# Patient Record
Sex: FEMALE | Race: WHITE | NOT HISPANIC OR LATINO | Employment: FULL TIME | URBAN - METROPOLITAN AREA
[De-identification: names, ages, dates, MRNs, and addresses within clinical notes are randomized per-mention and may not be internally consistent; named-entity substitution may affect disease eponyms.]

---

## 2018-01-19 ENCOUNTER — OFFICE VISIT CONVERTED (OUTPATIENT)
Dept: FAMILY MEDICINE CLINIC | Facility: CLINIC | Age: 48
End: 2018-01-19
Attending: NURSE PRACTITIONER

## 2018-01-19 ENCOUNTER — CONVERSION ENCOUNTER (OUTPATIENT)
Dept: FAMILY MEDICINE CLINIC | Facility: CLINIC | Age: 48
End: 2018-01-19

## 2018-08-24 ENCOUNTER — OFFICE VISIT CONVERTED (OUTPATIENT)
Dept: FAMILY MEDICINE CLINIC | Facility: CLINIC | Age: 48
End: 2018-08-24
Attending: NURSE PRACTITIONER

## 2018-11-29 ENCOUNTER — OFFICE VISIT CONVERTED (OUTPATIENT)
Dept: FAMILY MEDICINE CLINIC | Facility: CLINIC | Age: 48
End: 2018-11-29
Attending: NURSE PRACTITIONER

## 2018-11-29 ENCOUNTER — CONVERSION ENCOUNTER (OUTPATIENT)
Dept: FAMILY MEDICINE CLINIC | Facility: CLINIC | Age: 48
End: 2018-11-29

## 2019-03-26 ENCOUNTER — OFFICE VISIT CONVERTED (OUTPATIENT)
Dept: FAMILY MEDICINE CLINIC | Facility: CLINIC | Age: 49
End: 2019-03-26
Attending: FAMILY MEDICINE

## 2019-03-26 ENCOUNTER — HOSPITAL ENCOUNTER (OUTPATIENT)
Dept: FAMILY MEDICINE CLINIC | Facility: CLINIC | Age: 49
Discharge: HOME | End: 2019-03-26
Attending: FAMILY MEDICINE

## 2019-03-26 ENCOUNTER — CONVERSION ENCOUNTER (OUTPATIENT)
Dept: FAMILY MEDICINE CLINIC | Facility: CLINIC | Age: 49
End: 2019-03-26

## 2019-03-29 LAB — BACTERIA SPEC AEROBE CULT: NORMAL

## 2020-01-02 ENCOUNTER — CONVERSION ENCOUNTER (OUTPATIENT)
Dept: FAMILY MEDICINE CLINIC | Facility: CLINIC | Age: 50
End: 2020-01-02

## 2020-01-02 ENCOUNTER — OFFICE VISIT CONVERTED (OUTPATIENT)
Dept: FAMILY MEDICINE CLINIC | Facility: CLINIC | Age: 50
End: 2020-01-02
Attending: FAMILY MEDICINE

## 2020-11-03 ENCOUNTER — OFFICE VISIT CONVERTED (OUTPATIENT)
Dept: FAMILY MEDICINE CLINIC | Facility: CLINIC | Age: 50
End: 2020-11-03
Attending: FAMILY MEDICINE

## 2020-11-03 ENCOUNTER — HOSPITAL ENCOUNTER (OUTPATIENT)
Dept: FAMILY MEDICINE CLINIC | Facility: CLINIC | Age: 50
Discharge: HOME | End: 2020-11-03
Attending: FAMILY MEDICINE

## 2020-11-04 LAB
ALBUMIN SERPL-MCNC: 4.4 G/DL (ref 3.5–5)
ALBUMIN/GLOB SERPL: 2 {RATIO} (ref 1.4–2.6)
ALP SERPL-CCNC: 105 U/L (ref 42–98)
ALT SERPL-CCNC: 14 U/L (ref 10–40)
ANION GAP SERPL CALC-SCNC: 16 MMOL/L (ref 8–19)
AST SERPL-CCNC: 18 U/L (ref 15–50)
BASOPHILS # BLD AUTO: 0.11 10*3/UL (ref 0–0.2)
BASOPHILS NFR BLD AUTO: 1.1 % (ref 0–3)
BILIRUB SERPL-MCNC: <0.15 MG/DL (ref 0.2–1.3)
BUN SERPL-MCNC: 11 MG/DL (ref 5–25)
BUN/CREAT SERPL: 14 {RATIO} (ref 6–20)
CALCIUM SERPL-MCNC: 9.4 MG/DL (ref 8.7–10.4)
CHLORIDE SERPL-SCNC: 108 MMOL/L (ref 99–111)
CONV ABS IMM GRAN: 0.03 10*3/UL (ref 0–0.2)
CONV CO2: 23 MMOL/L (ref 22–32)
CONV IMMATURE GRAN: 0.3 % (ref 0–1.8)
CONV TOTAL PROTEIN: 6.6 G/DL (ref 6.3–8.2)
CREAT UR-MCNC: 0.81 MG/DL (ref 0.5–0.9)
DEPRECATED RDW RBC AUTO: 46.3 FL (ref 36.4–46.3)
EOSINOPHIL # BLD AUTO: 0.42 10*3/UL (ref 0–0.7)
EOSINOPHIL # BLD AUTO: 4.1 % (ref 0–7)
ERYTHROCYTE [DISTWIDTH] IN BLOOD BY AUTOMATED COUNT: 12.9 % (ref 11.7–14.4)
FOLATE SERPL-MCNC: 12.1 NG/ML (ref 4.8–20)
GFR SERPLBLD BASED ON 1.73 SQ M-ARVRAT: >60 ML/MIN/{1.73_M2}
GLOBULIN UR ELPH-MCNC: 2.2 G/DL (ref 2–3.5)
GLUCOSE SERPL-MCNC: 99 MG/DL (ref 65–99)
HCT VFR BLD AUTO: 45.7 % (ref 37–47)
HGB BLD-MCNC: 15 G/DL (ref 12–16)
LYMPHOCYTES # BLD AUTO: 3.51 10*3/UL (ref 1–5)
LYMPHOCYTES NFR BLD AUTO: 33.8 % (ref 20–45)
MCH RBC QN AUTO: 31.9 PG (ref 27–31)
MCHC RBC AUTO-ENTMCNC: 32.8 G/DL (ref 33–37)
MCV RBC AUTO: 97.2 FL (ref 81–99)
MONOCYTES # BLD AUTO: 0.66 10*3/UL (ref 0.2–1.2)
MONOCYTES NFR BLD AUTO: 6.4 % (ref 3–10)
NEUTROPHILS # BLD AUTO: 5.64 10*3/UL (ref 2–8)
NEUTROPHILS NFR BLD AUTO: 54.3 % (ref 30–85)
NRBC CBCN: 0 % (ref 0–0.7)
OSMOLALITY SERPL CALC.SUM OF ELEC: 295 MOSM/KG (ref 273–304)
PLATELET # BLD AUTO: 306 10*3/UL (ref 130–400)
PMV BLD AUTO: 11.1 FL (ref 9.4–12.3)
POTASSIUM SERPL-SCNC: 4.3 MMOL/L (ref 3.5–5.3)
RBC # BLD AUTO: 4.7 10*6/UL (ref 4.2–5.4)
SODIUM SERPL-SCNC: 143 MMOL/L (ref 135–147)
T4 FREE SERPL-MCNC: 1.1 NG/DL (ref 0.9–1.8)
TSH SERPL-ACNC: 1.35 M[IU]/L (ref 0.27–4.2)
VIT B12 SERPL-MCNC: 891 PG/ML (ref 211–911)
WBC # BLD AUTO: 10.37 10*3/UL (ref 4.8–10.8)

## 2020-11-20 ENCOUNTER — OFFICE VISIT CONVERTED (OUTPATIENT)
Dept: FAMILY MEDICINE CLINIC | Facility: CLINIC | Age: 50
End: 2020-11-20
Attending: NURSE PRACTITIONER

## 2020-11-20 ENCOUNTER — CONVERSION ENCOUNTER (OUTPATIENT)
Dept: FAMILY MEDICINE CLINIC | Facility: CLINIC | Age: 50
End: 2020-11-20

## 2020-11-20 ENCOUNTER — HOSPITAL ENCOUNTER (OUTPATIENT)
Dept: FAMILY MEDICINE CLINIC | Facility: CLINIC | Age: 50
Discharge: HOME | End: 2020-11-20
Attending: NURSE PRACTITIONER

## 2021-05-09 VITALS
DIASTOLIC BLOOD PRESSURE: 88 MMHG | TEMPERATURE: 97.2 F | HEIGHT: 65 IN | SYSTOLIC BLOOD PRESSURE: 132 MMHG | BODY MASS INDEX: 27.72 KG/M2 | OXYGEN SATURATION: 96 % | HEART RATE: 92 BPM | WEIGHT: 166.37 LBS

## 2021-05-09 VITALS
WEIGHT: 168 LBS | SYSTOLIC BLOOD PRESSURE: 145 MMHG | HEART RATE: 80 BPM | TEMPERATURE: 97.7 F | DIASTOLIC BLOOD PRESSURE: 90 MMHG | OXYGEN SATURATION: 97 %

## 2021-05-09 VITALS
TEMPERATURE: 97.4 F | HEART RATE: 81 BPM | SYSTOLIC BLOOD PRESSURE: 130 MMHG | WEIGHT: 168.25 LBS | OXYGEN SATURATION: 98 % | DIASTOLIC BLOOD PRESSURE: 86 MMHG

## 2021-05-09 VITALS
WEIGHT: 169.5 LBS | HEIGHT: 65 IN | TEMPERATURE: 97.6 F | BODY MASS INDEX: 28.24 KG/M2 | OXYGEN SATURATION: 99 % | SYSTOLIC BLOOD PRESSURE: 120 MMHG | HEART RATE: 85 BPM | DIASTOLIC BLOOD PRESSURE: 76 MMHG

## 2021-05-09 VITALS
HEIGHT: 65 IN | WEIGHT: 152.5 LBS | TEMPERATURE: 98 F | BODY MASS INDEX: 25.41 KG/M2 | SYSTOLIC BLOOD PRESSURE: 142 MMHG | DIASTOLIC BLOOD PRESSURE: 92 MMHG | HEART RATE: 75 BPM | OXYGEN SATURATION: 96 %

## 2021-05-09 VITALS
TEMPERATURE: 97.6 F | OXYGEN SATURATION: 98 % | HEART RATE: 101 BPM | WEIGHT: 165.5 LBS | SYSTOLIC BLOOD PRESSURE: 120 MMHG | DIASTOLIC BLOOD PRESSURE: 84 MMHG

## 2021-05-09 VITALS
DIASTOLIC BLOOD PRESSURE: 94 MMHG | TEMPERATURE: 97 F | SYSTOLIC BLOOD PRESSURE: 162 MMHG | WEIGHT: 168.5 LBS | HEART RATE: 85 BPM | OXYGEN SATURATION: 97 %

## 2021-07-26 DIAGNOSIS — F32.9 MAJOR DEPRESSIVE DISORDER, SINGLE EPISODE, UNSPECIFIED: ICD-10-CM

## 2021-07-26 RX ORDER — QUETIAPINE 150 MG/1
150 TABLET, FILM COATED, EXTENDED RELEASE ORAL DAILY
COMMUNITY
Start: 2021-04-29 | End: 2021-07-26

## 2021-07-26 RX ORDER — DULOXETIN HYDROCHLORIDE 60 MG/1
CAPSULE, DELAYED RELEASE ORAL
Qty: 30 CAPSULE | Refills: 0 | Status: SHIPPED | OUTPATIENT
Start: 2021-07-26 | End: 2021-08-24

## 2021-07-26 RX ORDER — QUETIAPINE 150 MG/1
TABLET, FILM COATED, EXTENDED RELEASE ORAL
Qty: 30 TABLET | Refills: 2 | Status: SHIPPED | OUTPATIENT
Start: 2021-07-26 | End: 2021-11-05

## 2021-07-26 RX ORDER — DULOXETIN HYDROCHLORIDE 60 MG/1
60 CAPSULE, DELAYED RELEASE ORAL DAILY
COMMUNITY
Start: 2021-04-29 | End: 2021-07-26

## 2021-07-29 ENCOUNTER — OFFICE VISIT (OUTPATIENT)
Dept: FAMILY MEDICINE CLINIC | Facility: CLINIC | Age: 51
End: 2021-07-29

## 2021-07-29 VITALS
HEART RATE: 83 BPM | WEIGHT: 160.8 LBS | DIASTOLIC BLOOD PRESSURE: 70 MMHG | OXYGEN SATURATION: 96 % | TEMPERATURE: 98.4 F | SYSTOLIC BLOOD PRESSURE: 125 MMHG | BODY MASS INDEX: 26.76 KG/M2

## 2021-07-29 DIAGNOSIS — R51.9 CHRONIC NONINTRACTABLE HEADACHE, UNSPECIFIED HEADACHE TYPE: ICD-10-CM

## 2021-07-29 DIAGNOSIS — G43.909 MIGRAINE WITHOUT STATUS MIGRAINOSUS, NOT INTRACTABLE, UNSPECIFIED MIGRAINE TYPE: Primary | ICD-10-CM

## 2021-07-29 DIAGNOSIS — L98.9 SKIN LESION: ICD-10-CM

## 2021-07-29 DIAGNOSIS — F32.A ANXIETY AND DEPRESSION: ICD-10-CM

## 2021-07-29 DIAGNOSIS — M19.90 ARTHRITIS: ICD-10-CM

## 2021-07-29 DIAGNOSIS — F41.9 ANXIETY AND DEPRESSION: ICD-10-CM

## 2021-07-29 DIAGNOSIS — G89.29 CHRONIC NONINTRACTABLE HEADACHE, UNSPECIFIED HEADACHE TYPE: ICD-10-CM

## 2021-07-29 PROCEDURE — 99214 OFFICE O/P EST MOD 30 MIN: CPT | Performed by: FAMILY MEDICINE

## 2021-07-29 RX ORDER — TOPIRAMATE 25 MG/1
25 TABLET ORAL DAILY
Qty: 30 TABLET | Refills: 2 | Status: SHIPPED | OUTPATIENT
Start: 2021-07-29 | End: 2021-11-05

## 2021-07-29 RX ORDER — IBUPROFEN 800 MG/1
800 TABLET ORAL DAILY PRN
Qty: 30 TABLET | Refills: 3 | Status: SHIPPED | OUTPATIENT
Start: 2021-07-29 | End: 2021-11-29

## 2021-07-29 NOTE — PROGRESS NOTES
Chief Complaint  Anxiety (Refills) and Depression    Subjective          Rosie Medrano presents to [facility]  Pt has chronic headaches- she has had to miss 3 days over last 6 months due to headaches  Pt has left shoulder skin lesion that is getting larger and more irregular over last few months.    Anxiety  Presents for follow-up visit. Patient reports no chest pain, compulsions, confusion, decreased concentration, depressed mood, dizziness, dry mouth, excessive worry, feeling of choking, hyperventilation, insomnia, irritability, malaise, muscle tension, nausea, nervous/anxious behavior, obsessions, palpitations, panic, restlessness, shortness of breath or suicidal ideas. Symptoms occur occasionally. The severity of symptoms is moderate. The quality of sleep is good. Nighttime awakenings: none.     Her past medical history is significant for depression. Compliance with medications is %. Treatment side effects: no side effects.   Depression  Visit Type: follow-up  Patient presents with the following symptoms: fatigue and hypersomnia.  Patient is not experiencing: anhedonia, chest pain, choking sensation, compulsions, confusion, decreased concentration, depressed mood, dizziness, dry mouth, excessive worry, feelings of hopelessness, feelings of worthlessness, hyperventilation, insomnia, irritability, malaise, memory impairment, muscle tension, nausea, nervousness/anxiety, obsessions, palpitations, panic, restlessness, shortness of breath, suicidal ideas, suicidal planning, thoughts of death, weight gain and weight loss.  Frequency of symptoms: occasionally   Severity: moderate   Sleep quality: good  Nighttime awakenings: none  Compliance with medications:  %  Treatment side effects: no side effects.  Headache   Chronicity: intermittent x 1 yr. Episode onset: 1 yr ago. Episode frequency: gets headache once every 2 weeks. Progression since onset: last headache was 2  weeks ago. The pain is located in the occipital and right unilateral region. The pain does not radiate. The pain quality is similar to prior headaches. The quality of the pain is described as sharp. Pain severity now: now no pain. Associated symptoms include vomiting. Pertinent negatives include no abdominal pain, abnormal behavior, anorexia, back pain, blurred vision, coughing, dizziness, drainage, ear pain, eye pain, eye redness, eye watering, facial sweating, fever, hearing loss, insomnia, loss of balance, muscle aches, nausea, neck pain, numbness, phonophobia, photophobia, rhinorrhea, scalp tenderness, seizures, sinus pressure, sore throat, swollen glands, tingling, tinnitus, visual change, weakness or weight loss. Nothing aggravates the symptoms. She has tried Excedrin for the symptoms. The treatment provided significant relief.       Objective   No Known Allergies    There is no immunization history on file for this patient.    Vital Signs:   Vitals:    07/29/21 1509   BP: 125/70   Pulse: 83   Temp: 98.4 °F (36.9 °C)   SpO2: 96%   Weight: 72.9 kg (160 lb 12.8 oz)       Physical Exam  Vitals reviewed.   Constitutional:       Appearance: Normal appearance. She is well-developed.   HENT:      Head: Normocephalic and atraumatic.      Right Ear: External ear normal.      Left Ear: External ear normal.      Mouth/Throat:      Pharynx: No oropharyngeal exudate.   Eyes:      Conjunctiva/sclera: Conjunctivae normal.      Pupils: Pupils are equal, round, and reactive to light.   Cardiovascular:      Rate and Rhythm: Normal rate and regular rhythm.      Pulses: Normal pulses.      Heart sounds: Normal heart sounds. No murmur heard.   No friction rub. No gallop.    Pulmonary:      Effort: Pulmonary effort is normal.      Breath sounds: Normal breath sounds. No wheezing or rhonchi.   Abdominal:      General: Bowel sounds are normal. There is no distension.      Palpations: Abdomen is soft.      Tenderness: There is no  abdominal tenderness.   Skin:     General: Skin is warm and dry.      Comments: Left shoulder 1cm irregular pigmented raised skin lesion   Neurological:      Mental Status: She is alert and oriented to person, place, and time.      Cranial Nerves: No cranial nerve deficit.   Psychiatric:         Mood and Affect: Mood and affect normal.         Behavior: Behavior normal.         Thought Content: Thought content normal.         Judgment: Judgment normal.        Result Review :   The following data was reviewed by: Andrés Marcial MD on 07/29/2021:  CMP    CMP 11/3/20   Glucose 99   BUN 11   Creatinine 0.81   Sodium 143   Potassium 4.3   Chloride 108   Calcium 9.4   Albumin 4.4   Total Bilirubin <0.15 (A)   Alkaline Phosphatase 105 (A)   AST (SGOT) 18   ALT (SGPT) 14   (A) Abnormal value            CBC    CBC 11/3/20   WBC 10.37   RBC 4.70   Hemoglobin 15.0   Hematocrit 45.7   MCV 97.2   MCH 31.9 (A)   MCHC 32.8 (A)   RDW 12.9   Platelets 306   (A) Abnormal value            TSH    TSH 11/3/20   TSH 1.350                     Assessment and Plan    Diagnoses and all orders for this visit:    1. Migraine without status migrainosus, not intractable, unspecified migraine type (Primary)  -     topiramate (Topamax) 25 MG tablet; Take 1 tablet by mouth Daily.  Dispense: 30 tablet; Refill: 2  -     MRI Brain With & Without Contrast; Future    2. Arthritis  -     ibuprofen (ADVIL,MOTRIN) 800 MG tablet; Take 1 tablet by mouth Daily As Needed for Mild Pain .  Dispense: 30 tablet; Refill: 3    3. Chronic nonintractable headache, unspecified headache type  -     MRI Brain With & Without Contrast; Future    4. Anxiety and depression    5. Skin lesion    will set up appt to remove lesion in office.        Follow Up   Return in about 1 month (around 8/29/2021) for Recheck.  Patient was given instructions and counseling regarding her condition or for health maintenance advice. Please see specific information pulled into the AVS if  appropriate.

## 2021-08-12 ENCOUNTER — HOSPITAL ENCOUNTER (OUTPATIENT)
Dept: MRI IMAGING | Facility: HOSPITAL | Age: 51
Discharge: HOME | End: 2021-08-12
Admitting: FAMILY MEDICINE

## 2021-08-12 DIAGNOSIS — R51.9 CHRONIC NONINTRACTABLE HEADACHE, UNSPECIFIED HEADACHE TYPE: ICD-10-CM

## 2021-08-12 DIAGNOSIS — G89.29 CHRONIC NONINTRACTABLE HEADACHE, UNSPECIFIED HEADACHE TYPE: ICD-10-CM

## 2021-08-12 DIAGNOSIS — G43.909 MIGRAINE WITHOUT STATUS MIGRAINOSUS, NOT INTRACTABLE, UNSPECIFIED MIGRAINE TYPE: ICD-10-CM

## 2021-08-12 PROCEDURE — 70553 MRI BRAIN STEM W/O & W/DYE: CPT

## 2021-08-12 PROCEDURE — 0 GADOBENATE DIMEGLUMINE 529 MG/ML SOLUTION: Performed by: FAMILY MEDICINE

## 2021-08-12 PROCEDURE — A9577 INJ MULTIHANCE: HCPCS | Performed by: FAMILY MEDICINE

## 2021-08-12 RX ADMIN — GADOBENATE DIMEGLUMINE 16 ML: 529 INJECTION, SOLUTION INTRAVENOUS at 16:12

## 2021-08-13 ENCOUNTER — TELEPHONE (OUTPATIENT)
Dept: FAMILY MEDICINE CLINIC | Facility: CLINIC | Age: 51
End: 2021-08-13

## 2021-08-13 NOTE — PROGRESS NOTES
Call pt:MRI showed some nonspecific changes, this may not be anything, but it could be caused by a variety of things, including lyme disease, migraine headaches, or MS.  Please follow-up in office to discuss.  No tumors were seen.Please schedule an appt in next 1-2 weeks to discuss.

## 2021-08-13 NOTE — TELEPHONE ENCOUNTER
Caller: Rosie Medrano    Relationship to patient: Self    Best call back number: [phone]     Patient is needing: PT CALLED TO INFORM THE [place] PAPERWORK CAN BE MAILED TO HER HOUSE.  THANK YOU.

## 2021-08-24 DIAGNOSIS — F32.9 MAJOR DEPRESSIVE DISORDER, SINGLE EPISODE, UNSPECIFIED: ICD-10-CM

## 2021-08-24 RX ORDER — DULOXETIN HYDROCHLORIDE 60 MG/1
CAPSULE, DELAYED RELEASE ORAL
Qty: 30 CAPSULE | Refills: 0 | Status: SHIPPED | OUTPATIENT
Start: 2021-08-24 | End: 2021-09-17

## 2021-09-17 DIAGNOSIS — F32.9 MAJOR DEPRESSIVE DISORDER, SINGLE EPISODE, UNSPECIFIED: ICD-10-CM

## 2021-09-17 RX ORDER — DULOXETIN HYDROCHLORIDE 60 MG/1
CAPSULE, DELAYED RELEASE ORAL
Qty: 30 CAPSULE | Refills: 0 | Status: SHIPPED | OUTPATIENT
Start: 2021-09-17 | End: 2021-11-05

## 2021-11-05 DIAGNOSIS — F32.9 MAJOR DEPRESSIVE DISORDER, SINGLE EPISODE, UNSPECIFIED: ICD-10-CM

## 2021-11-05 DIAGNOSIS — G43.909 MIGRAINE WITHOUT STATUS MIGRAINOSUS, NOT INTRACTABLE, UNSPECIFIED MIGRAINE TYPE: ICD-10-CM

## 2021-11-05 RX ORDER — DULOXETIN HYDROCHLORIDE 60 MG/1
CAPSULE, DELAYED RELEASE ORAL
Qty: 30 CAPSULE | Refills: 0 | Status: SHIPPED | OUTPATIENT
Start: 2021-11-05 | End: 2021-12-06

## 2021-11-05 RX ORDER — TOPIRAMATE 25 MG/1
25 TABLET ORAL DAILY
Qty: 30 TABLET | Refills: 0 | Status: SHIPPED | OUTPATIENT
Start: 2021-11-05 | End: 2021-11-19

## 2021-11-05 RX ORDER — QUETIAPINE 150 MG/1
TABLET, FILM COATED, EXTENDED RELEASE ORAL
Qty: 30 TABLET | Refills: 0 | Status: SHIPPED | OUTPATIENT
Start: 2021-11-05 | End: 2021-12-06

## 2021-11-19 ENCOUNTER — OFFICE VISIT (OUTPATIENT)
Dept: FAMILY MEDICINE CLINIC | Facility: CLINIC | Age: 51
End: 2021-11-19

## 2021-11-19 VITALS
SYSTOLIC BLOOD PRESSURE: 140 MMHG | HEART RATE: 88 BPM | BODY MASS INDEX: 27.66 KG/M2 | HEIGHT: 65 IN | DIASTOLIC BLOOD PRESSURE: 90 MMHG | OXYGEN SATURATION: 97 % | WEIGHT: 166 LBS | TEMPERATURE: 97.3 F

## 2021-11-19 DIAGNOSIS — R53.83 FATIGUE, UNSPECIFIED TYPE: ICD-10-CM

## 2021-11-19 DIAGNOSIS — R90.89 ABNORMAL BRAIN MRI: Primary | ICD-10-CM

## 2021-11-19 DIAGNOSIS — I10 PRIMARY HYPERTENSION: ICD-10-CM

## 2021-11-19 DIAGNOSIS — I15.9 SECONDARY HYPERTENSION: ICD-10-CM

## 2021-11-19 DIAGNOSIS — G43.909 MIGRAINE WITHOUT STATUS MIGRAINOSUS, NOT INTRACTABLE, UNSPECIFIED MIGRAINE TYPE: ICD-10-CM

## 2021-11-19 PROCEDURE — 82607 VITAMIN B-12: CPT | Performed by: FAMILY MEDICINE

## 2021-11-19 PROCEDURE — 80053 COMPREHEN METABOLIC PANEL: CPT | Performed by: FAMILY MEDICINE

## 2021-11-19 PROCEDURE — 84439 ASSAY OF FREE THYROXINE: CPT | Performed by: FAMILY MEDICINE

## 2021-11-19 PROCEDURE — 85025 COMPLETE CBC W/AUTO DIFF WBC: CPT | Performed by: FAMILY MEDICINE

## 2021-11-19 PROCEDURE — 82746 ASSAY OF FOLIC ACID SERUM: CPT | Performed by: FAMILY MEDICINE

## 2021-11-19 PROCEDURE — 99214 OFFICE O/P EST MOD 30 MIN: CPT | Performed by: FAMILY MEDICINE

## 2021-11-19 PROCEDURE — 84443 ASSAY THYROID STIM HORMONE: CPT | Performed by: FAMILY MEDICINE

## 2021-11-19 RX ORDER — PROPRANOLOL HCL 60 MG
60 CAPSULE, EXTENDED RELEASE 24HR ORAL DAILY
Qty: 30 CAPSULE | Refills: 1 | Status: SHIPPED | OUTPATIENT
Start: 2021-11-19 | End: 2022-01-12

## 2021-11-19 RX ORDER — SUMATRIPTAN 100 MG/1
TABLET, FILM COATED ORAL
Qty: 10 TABLET | Refills: 3 | Status: SHIPPED | OUTPATIENT
Start: 2021-11-19 | End: 2022-05-10 | Stop reason: SDUPTHER

## 2021-11-19 NOTE — PROGRESS NOTES
"Chief Complaint  Headache (follow up to MRI for headaches), Fatigue (extreme fatigue), and Nevus (wants a mole removed from left shoulder)    Subjective          Rosie Medrano presents to [facility]  Discussed migraine headaches- topamax is causing her to oversleep- pt is having migraine headache weekly  Discussed MRI- nonspecific findings in cerebrum     Pt has raised skin colored lesion left shoulder- looks to likely be large skin tag- has had x several months- is getting irritated more often  Fatigue  This is a new problem. The current episode started more than 1 month ago. The problem occurs intermittently. The problem has been unchanged. Associated symptoms include fatigue and headaches. Pertinent negatives include no abdominal pain, anorexia, arthralgias, change in bowel habit, chest pain, chills, congestion, coughing, diaphoresis, fever, joint swelling, myalgias, nausea, neck pain, numbness, rash, sore throat, swollen glands, vertigo, visual change, vomiting or weakness. Nothing aggravates the symptoms. She has tried nothing for the symptoms.       Objective   No Known Allergies    There is no immunization history on file for this patient.    Vital Signs:   Vitals:    11/19/21 1706   BP: 140/90   BP Location: Left arm   Pulse: 88   Temp: 97.3 °F (36.3 °C)   SpO2: 97%   Weight: 75.3 kg (166 lb)   Height: 165.1 cm (65\")       Physical Exam  Vitals reviewed. Exam conducted with a chaperone present.   Constitutional:       Appearance: Normal appearance. She is well-developed.   HENT:      Head: Normocephalic and atraumatic.      Right Ear: External ear normal.      Left Ear: External ear normal.      Mouth/Throat:      Mouth: Mucous membranes are moist.      Pharynx: Oropharynx is clear. No oropharyngeal exudate or posterior oropharyngeal erythema.   Eyes:      Extraocular Movements: Extraocular movements intact.      Conjunctiva/sclera: Conjunctivae normal.      Pupils: " Pupils are equal, round, and reactive to light.   Cardiovascular:      Rate and Rhythm: Normal rate and regular rhythm.      Pulses: Normal pulses.      Heart sounds: Normal heart sounds. No murmur heard.  No friction rub. No gallop.    Pulmonary:      Effort: Pulmonary effort is normal.      Breath sounds: Normal breath sounds. No wheezing or rhonchi.   Abdominal:      General: Abdomen is flat. Bowel sounds are normal. There is no distension.      Palpations: Abdomen is soft. There is no mass.      Tenderness: There is no abdominal tenderness. There is no guarding or rebound.      Hernia: No hernia is present.   Musculoskeletal:      Cervical back: Normal range of motion and neck supple.   Skin:     General: Skin is warm and dry.      Capillary Refill: Capillary refill takes less than 2 seconds.      Comments: O.25-0.5cm skin colored raised lesion left posterior shoulder   Neurological:      General: No focal deficit present.      Mental Status: She is alert and oriented to person, place, and time.      Cranial Nerves: No cranial nerve deficit.   Psychiatric:         Mood and Affect: Mood and affect normal.         Behavior: Behavior normal.         Thought Content: Thought content normal.         Judgment: Judgment normal.        Result Review :                 Assessment and Plan    Diagnoses and all orders for this visit:    1. Abnormal brain MRI (Primary)  -     Ambulatory Referral to Neurology    2. Migraine without status migrainosus, not intractable, unspecified migraine type  -     SUMAtriptan (Imitrex) 100 MG tablet; Take one tablet at onset of headache. May repeat dose one time in 2 hours if headache not relieved.  Dispense: 10 tablet; Refill: 3  -     Ambulatory Referral to Neurology  -     Lyme Disease IgG/IgM Antibodies    3. Primary hypertension  -     propranolol LA (INDERAL LA) 60 MG 24 hr capsule; Take 1 capsule by mouth Daily.  Dispense: 30 capsule; Refill: 1    4. Fatigue, unspecified type  -      CBC Auto Differential  -     Comprehensive Metabolic Panel  -     TSH+Free T4  -     Vitamin B12 & Folate            Follow Up   Return in about 2 weeks (around 12/3/2021) for Recheck.  Patient was given instructions and counseling regarding her condition or for health maintenance advice. Please see specific information pulled into the AVS if appropriate.   Pt will schedule appt to remove skin lesion

## 2021-11-20 LAB
ALBUMIN SERPL-MCNC: 4.8 G/DL (ref 3.5–5.2)
ALBUMIN/GLOB SERPL: 2.1 G/DL
ALP SERPL-CCNC: 116 U/L (ref 39–117)
ALT SERPL W P-5'-P-CCNC: 17 U/L (ref 1–33)
ANION GAP SERPL CALCULATED.3IONS-SCNC: 11.4 MMOL/L (ref 5–15)
AST SERPL-CCNC: 26 U/L (ref 1–32)
BASOPHILS # BLD AUTO: 0.12 10*3/MM3 (ref 0–0.2)
BASOPHILS NFR BLD AUTO: 1.1 % (ref 0–1.5)
BILIRUB SERPL-MCNC: 0.4 MG/DL (ref 0–1.2)
BUN SERPL-MCNC: 11 MG/DL (ref 6–20)
BUN/CREAT SERPL: 55 (ref 7–25)
CALCIUM SPEC-SCNC: 9.7 MG/DL (ref 8.6–10.5)
CHLORIDE SERPL-SCNC: 102 MMOL/L (ref 98–107)
CO2 SERPL-SCNC: 23.6 MMOL/L (ref 22–29)
CREAT SERPL-MCNC: 0.2 MG/DL (ref 0.57–1)
DEPRECATED RDW RBC AUTO: 45 FL (ref 37–54)
EOSINOPHIL # BLD AUTO: 0.42 10*3/MM3 (ref 0–0.4)
EOSINOPHIL NFR BLD AUTO: 3.9 % (ref 0.3–6.2)
ERYTHROCYTE [DISTWIDTH] IN BLOOD BY AUTOMATED COUNT: 12.4 % (ref 12.3–15.4)
FOLATE SERPL-MCNC: 15.6 NG/ML (ref 4.78–24.2)
GFR SERPL CREATININE-BSD FRML MDRD: >150 ML/MIN/1.73
GLOBULIN UR ELPH-MCNC: 2.3 GM/DL
GLUCOSE SERPL-MCNC: 84 MG/DL (ref 65–99)
HCT VFR BLD AUTO: 48 % (ref 34–46.6)
HGB BLD-MCNC: 15.8 G/DL (ref 12–15.9)
IMM GRANULOCYTES # BLD AUTO: 0.03 10*3/MM3 (ref 0–0.05)
IMM GRANULOCYTES NFR BLD AUTO: 0.3 % (ref 0–0.5)
LYMPHOCYTES # BLD AUTO: 3.82 10*3/MM3 (ref 0.7–3.1)
LYMPHOCYTES NFR BLD AUTO: 35.9 % (ref 19.6–45.3)
MCH RBC QN AUTO: 32.1 PG (ref 26.6–33)
MCHC RBC AUTO-ENTMCNC: 32.9 G/DL (ref 31.5–35.7)
MCV RBC AUTO: 97.6 FL (ref 79–97)
MONOCYTES # BLD AUTO: 0.7 10*3/MM3 (ref 0.1–0.9)
MONOCYTES NFR BLD AUTO: 6.6 % (ref 5–12)
NEUTROPHILS NFR BLD AUTO: 5.55 10*3/MM3 (ref 1.7–7)
NEUTROPHILS NFR BLD AUTO: 52.2 % (ref 42.7–76)
NRBC BLD AUTO-RTO: 0 /100 WBC (ref 0–0.2)
PLATELET # BLD AUTO: 322 10*3/MM3 (ref 140–450)
PMV BLD AUTO: 10.7 FL (ref 6–12)
POTASSIUM SERPL-SCNC: 4.4 MMOL/L (ref 3.5–5.2)
PROT SERPL-MCNC: 7.1 G/DL (ref 6–8.5)
RBC # BLD AUTO: 4.92 10*6/MM3 (ref 3.77–5.28)
SODIUM SERPL-SCNC: 137 MMOL/L (ref 136–145)
T4 FREE SERPL-MCNC: 1.17 NG/DL (ref 0.93–1.7)
TSH SERPL DL<=0.05 MIU/L-ACNC: 1.89 UIU/ML (ref 0.27–4.2)
VIT B12 BLD-MCNC: 974 PG/ML (ref 211–946)
WBC NRBC COR # BLD: 10.64 10*3/MM3 (ref 3.4–10.8)

## 2021-11-29 DIAGNOSIS — M19.90 ARTHRITIS: ICD-10-CM

## 2021-11-29 RX ORDER — IBUPROFEN 800 MG/1
800 TABLET ORAL DAILY PRN
Qty: 30 TABLET | Refills: 3 | Status: SHIPPED | OUTPATIENT
Start: 2021-11-29 | End: 2022-05-10 | Stop reason: SDUPTHER

## 2021-12-03 ENCOUNTER — PROCEDURE VISIT (OUTPATIENT)
Dept: FAMILY MEDICINE CLINIC | Facility: CLINIC | Age: 51
End: 2021-12-03

## 2021-12-03 VITALS
SYSTOLIC BLOOD PRESSURE: 130 MMHG | DIASTOLIC BLOOD PRESSURE: 80 MMHG | TEMPERATURE: 98 F | OXYGEN SATURATION: 99 % | HEART RATE: 98 BPM

## 2021-12-03 DIAGNOSIS — J01.00 ACUTE NON-RECURRENT MAXILLARY SINUSITIS: ICD-10-CM

## 2021-12-03 DIAGNOSIS — J02.9 PHARYNGITIS, UNSPECIFIED ETIOLOGY: ICD-10-CM

## 2021-12-03 DIAGNOSIS — M75.92 SHOULDER LESION, LEFT: Primary | ICD-10-CM

## 2021-12-03 PROCEDURE — 88304 TISSUE EXAM BY PATHOLOGIST: CPT | Performed by: FAMILY MEDICINE

## 2021-12-03 PROCEDURE — 87635 SARS-COV-2 COVID-19 AMP PRB: CPT | Performed by: FAMILY MEDICINE

## 2021-12-03 PROCEDURE — 99213 OFFICE O/P EST LOW 20 MIN: CPT | Performed by: FAMILY MEDICINE

## 2021-12-03 PROCEDURE — 11401 EXC TR-EXT B9+MARG 0.6-1 CM: CPT | Performed by: FAMILY MEDICINE

## 2021-12-03 RX ORDER — PREDNISONE 20 MG/1
40 TABLET ORAL DAILY
Qty: 10 TABLET | Refills: 0 | Status: SHIPPED | OUTPATIENT
Start: 2021-12-03 | End: 2021-12-08

## 2021-12-03 RX ORDER — SACCHAROMYCES BOULARDII 250 MG
250 CAPSULE ORAL 2 TIMES DAILY
Qty: 20 CAPSULE | Refills: 0 | Status: SHIPPED | OUTPATIENT
Start: 2021-12-03 | End: 2021-12-13

## 2021-12-03 RX ORDER — CEFDINIR 300 MG/1
300 CAPSULE ORAL 2 TIMES DAILY
Qty: 14 CAPSULE | Refills: 0 | Status: SHIPPED | OUTPATIENT
Start: 2021-12-03 | End: 2021-12-10

## 2021-12-03 NOTE — PATIENT INSTRUCTIONS
Sutured Wound Care  Sutures are stitches that can be used to close wounds. Taking care of your wound properly can help to prevent pain and infection. It can also help your wound to heal more quickly. Follow instructions from your health care provider about how to care for your sutured wound.  Supplies needed:  · Soap and water.  · A clean bandage (dressing), if needed.  · Antibiotic ointment.  · A clean towel.  How to care for your sutured wound    · Keep the wound completely dry for the first 24 hours, or for as long as directed by your health care provider. After 24-48 hours, you may shower or bathe as directed by your health care provider. Do not soak or submerge the wound in water until the sutures have been removed.  · After the first 24 hours, clean the wound once a day, or as often as directed by your health care provider, using the following steps:  ? Wash the wound with soap and water.  ? Rinse the wound with water to remove all soap.  ? Pat the wound dry with a clean towel. Do not rub the wound.  · After cleaning the wound, apply a thin layer of antibiotic ointment as directed by your health care provider. This will prevent infection and keep the dressing from sticking to the wound.  · Follow instructions from your health care provider about how to change your dressing:  ? Wash your hands with soap and water. If soap and water are not available, use hand .  ? Change your dressing at least once a day, or as often as told by your health care provider. If your dressing gets wet or dirty, change it.  ? Leave sutures and other skin closures, such as adhesive tape or skin glue, in place. These skin closures may need to stay in place for 2 weeks or longer. If adhesive strip edges start to loosen and curl up, you may trim the loose edges. Do not remove adhesive strips completely unless your health care provider tells you to do that.  · Check your wound every day for signs of infection. Watch  for:  ? Redness, swelling, or pain.  ? Fluid or blood.  ? Warmth.  ? Pus or a bad smell.  · Have the sutures removed as directed by your health care provider.  Follow these instructions at home:  Medicines  · Take or apply over-the-counter and prescription medicines only as told by your health care provider.  · If you were prescribed an antibiotic medicine or ointment, take or apply it as told by your health care provider. Do not stop using the antibiotic even if your condition improves.  General instructions  · To help reduce scarring after your wound heals, cover your wound with clothing or apply sunscreen of at least 30 SPF whenever you are outside.  · Do not scratch or pick at your wound.  · Avoid stretching your wound.  · Raise (elevate) the injured area above the level of your heart while you are sitting or lying down, if possible.  · Drink enough fluids to keep your urine clear or pale yellow.  · Keep all follow-up visits as told by your health care provider. This is important.  Contact a health care provider if:  · You received a tetanus shot and you have swelling, severe pain, redness, or bleeding at the injection site.  · Your wound breaks open.  · You have redness, swelling, or pain around your wound.  · You have fluid or blood coming from your wound.  · Your wound feels warm to the touch.  · You have a fever.  · You notice something coming out of your wound, such as wood or glass.  · You have pain that does not get better with medicine.  · The skin near your wound changes color.  · You need to change your dressing very frequently due to a lot of fluid, blood, or pus draining from the wound.  · You develop a new rash.  · You develop numbness around the wound.  Get help right away if:  · You develop severe swelling around your wound.  · You have pus or a bad smell coming from your wound.  · Your pain suddenly gets worse and is severe.  · You develop painful lumps near your wound or anywhere on your  body.  · You have a red streak going away from your wound.  · The wound is on your hand or foot and:  ? You cannot properly move a finger or toe.  ? Your fingers or toes look pale or bluish.  ? You have numbness that is spreading down your hand, foot, fingers, or toes.  Summary  · Sutures are stitches that can be used to close wounds.  · Taking care of your wound properly can help to prevent pain and infection.  · Keep the wound completely dry for the first 24 hours, or for as long as directed by your health care provider. After 24-48 hours, you may shower or bathe as directed by your health care provider.  This information is not intended to replace advice given to you by your health care provider. Make sure you discuss any questions you have with your health care provider.  Document Revised: 02/25/2021 Document Reviewed: 01/23/2018  Elsevier Patient Education © 2021 [employer]

## 2021-12-03 NOTE — PROGRESS NOTES
Chief Complaint  Procedure (skin lesion removal) and Cough (with chest tightness)    Subjective          Rosie Medrano presents to [facility]  Pt has left superior shoulder lesion  Pt is not vaccinated for covid    URI   This is a new problem. Episode onset: 3 days. The problem has been gradually worsening. There has been no fever. Associated symptoms include congestion, coughing, sinus pain and a sore throat. Pertinent negatives include no abdominal pain, chest pain, diarrhea, dysuria, ear pain, headaches, joint pain, joint swelling, nausea, neck pain, plugged ear sensation, rash, rhinorrhea, sneezing, swollen glands, vomiting or wheezing. She has tried nothing for the symptoms.       Objective   No Known Allergies    There is no immunization history on file for this patient.    Vital Signs:   Vitals:    12/03/21 1635   BP: 130/80   BP Location: Left arm   Pulse: 98   Temp: 98 °F (36.7 °C)   SpO2: 99%       Physical Exam  Vitals reviewed.   Constitutional:       Appearance: Normal appearance. She is well-developed.   HENT:      Head: Normocephalic and atraumatic.      Right Ear: Tympanic membrane, ear canal and external ear normal.      Left Ear: Tympanic membrane, ear canal and external ear normal.      Nose: Nose normal.      Mouth/Throat:      Mouth: Mucous membranes are moist.      Pharynx: Oropharynx is clear. Posterior oropharyngeal erythema present. No oropharyngeal exudate.   Eyes:      Conjunctiva/sclera: Conjunctivae normal.      Pupils: Pupils are equal, round, and reactive to light.   Cardiovascular:      Rate and Rhythm: Normal rate and regular rhythm.      Pulses: Normal pulses.      Heart sounds: Normal heart sounds. No murmur heard.  No friction rub. No gallop.    Pulmonary:      Effort: Pulmonary effort is normal.      Breath sounds: Normal breath sounds. No wheezing or rhonchi.   Abdominal:      General: Bowel sounds are normal. There is no distension.       Palpations: Abdomen is soft.      Tenderness: There is no abdominal tenderness.   Musculoskeletal:         General: Normal range of motion.      Cervical back: Normal range of motion and neck supple.   Skin:     General: Skin is warm and dry.      Capillary Refill: Capillary refill takes less than 2 seconds.      Comments: Left shoulder skin lesion   Neurological:      General: No focal deficit present.      Mental Status: She is alert and oriented to person, place, and time.      Cranial Nerves: No cranial nerve deficit.   Psychiatric:         Mood and Affect: Mood and affect normal.         Behavior: Behavior normal.         Thought Content: Thought content normal.         Judgment: Judgment normal.        Result Review :          Biopsy    Date/Time: 12/3/2021 5:14 PM  Performed by: Andrés Marcial MD  Authorized by: Andrés Marcial MD     Procedure Details - Skin Biopsy:     Body area: upper extremity    Upper extremity location: L shoulder    Initial size (mm): 10    Final defect size (mm): 10    Malignancy: malignancy unknown      Destruction method comment: full-thickness excision    Cosmetic: no, lesion was getting larger and being irritated.      Comments:   Area was identified and made sterile with betadine.  Sterile technique used for entire procedure.  1% lidocaine with epi was injected to numb the area:  5ml ([id number] lot: [id number] exp: 02/2022).  #15 blade used to perform full-thickness excision of lesion with 2mm borders.  Lesion then sent to pathology.  Wound was then closed using 5 simple interrupted sutures using 4-0 ethylene.  Hemostasis achieved.  No complications.  Pt tolerated procedure well.  Wound was then covered with antibiotic ointment and a bandage.  Wound care was discussed with pt.          Assessment and Plan    Diagnoses and all orders for this visit:    1. Shoulder lesion, left (Primary)  -     Biopsy  -     Tissue Pathology Exam    2. Acute non-recurrent maxillary  sinusitis  -     cefdinir (OMNICEF) 300 MG capsule; Take 1 capsule by mouth 2 (Two) Times a Day for 7 days.  Dispense: 14 capsule; Refill: 0  -     saccharomyces boulardii (Florastor) 250 MG capsule; Take 1 capsule by mouth 2 (Two) Times a Day for 10 days.  Dispense: 20 capsule; Refill: 0  -     predniSONE (DELTASONE) 20 MG tablet; Take 2 tablets by mouth Daily for 5 days.  Dispense: 10 tablet; Refill: 0    3. Pharyngitis, unspecified etiology  -     COVID-19,CEPHEID/HONEY/BDMAX,COR/TORSTEN/PAD/TITI IN-HOUSE(OR EMERGENT/ADD-ON),NP SWAB IN TRANSPORT MEDIA 3-4 HR TAT, RT-PCR - Swab, Nasopharynx            Follow Up   Return in about 1 week (around 12/10/2021), or if symptoms worsen or fail to improve.  Patient was given instructions and counseling regarding her condition or for health maintenance advice. Please see specific information pulled into the AVS if appropriate.

## 2021-12-04 DIAGNOSIS — G43.909 MIGRAINE WITHOUT STATUS MIGRAINOSUS, NOT INTRACTABLE, UNSPECIFIED MIGRAINE TYPE: ICD-10-CM

## 2021-12-04 DIAGNOSIS — F32.9 MAJOR DEPRESSIVE DISORDER, SINGLE EPISODE, UNSPECIFIED: ICD-10-CM

## 2021-12-05 LAB — SARS-COV-2 N GENE RESP QL NAA+PROBE: DETECTED

## 2021-12-06 RX ORDER — QUETIAPINE 150 MG/1
TABLET, FILM COATED, EXTENDED RELEASE ORAL
Qty: 30 TABLET | Refills: 0 | Status: SHIPPED | OUTPATIENT
Start: 2021-12-06 | End: 2022-01-12

## 2021-12-06 RX ORDER — TOPIRAMATE 25 MG/1
25 TABLET ORAL DAILY
Qty: 30 TABLET | Refills: 0 | Status: SHIPPED | OUTPATIENT
Start: 2021-12-06 | End: 2022-01-12

## 2021-12-06 RX ORDER — DULOXETIN HYDROCHLORIDE 60 MG/1
CAPSULE, DELAYED RELEASE ORAL
Qty: 30 CAPSULE | Refills: 0 | Status: SHIPPED | OUTPATIENT
Start: 2021-12-06 | End: 2022-01-12

## 2021-12-07 LAB
CYTO UR: NORMAL
LAB AP CASE REPORT: NORMAL
LAB AP CLINICAL INFORMATION: NORMAL
PATH REPORT.FINAL DX SPEC: NORMAL
PATH REPORT.GROSS SPEC: NORMAL

## 2022-01-12 DIAGNOSIS — F32.9 MAJOR DEPRESSIVE DISORDER, SINGLE EPISODE, UNSPECIFIED: ICD-10-CM

## 2022-01-12 DIAGNOSIS — I10 PRIMARY HYPERTENSION: ICD-10-CM

## 2022-01-12 DIAGNOSIS — G43.909 MIGRAINE WITHOUT STATUS MIGRAINOSUS, NOT INTRACTABLE, UNSPECIFIED MIGRAINE TYPE: ICD-10-CM

## 2022-01-12 RX ORDER — QUETIAPINE 150 MG/1
TABLET, FILM COATED, EXTENDED RELEASE ORAL
Qty: 30 TABLET | Refills: 0 | Status: SHIPPED | OUTPATIENT
Start: 2022-01-12 | End: 2022-02-23

## 2022-01-12 RX ORDER — DULOXETIN HYDROCHLORIDE 60 MG/1
CAPSULE, DELAYED RELEASE ORAL
Qty: 30 CAPSULE | Refills: 0 | Status: SHIPPED | OUTPATIENT
Start: 2022-01-12 | End: 2022-02-23

## 2022-01-12 RX ORDER — TOPIRAMATE 25 MG/1
25 TABLET ORAL DAILY
Qty: 30 TABLET | Refills: 0 | Status: SHIPPED | OUTPATIENT
Start: 2022-01-12 | End: 2022-02-23

## 2022-01-12 RX ORDER — PROPRANOLOL HCL 60 MG
CAPSULE, EXTENDED RELEASE 24HR ORAL
Qty: 30 CAPSULE | Refills: 0 | Status: SHIPPED | OUTPATIENT
Start: 2022-01-12 | End: 2022-02-23

## 2022-02-23 DIAGNOSIS — I10 PRIMARY HYPERTENSION: ICD-10-CM

## 2022-02-23 DIAGNOSIS — G43.909 MIGRAINE WITHOUT STATUS MIGRAINOSUS, NOT INTRACTABLE, UNSPECIFIED MIGRAINE TYPE: ICD-10-CM

## 2022-02-23 DIAGNOSIS — F32.9 MAJOR DEPRESSIVE DISORDER, SINGLE EPISODE, UNSPECIFIED: ICD-10-CM

## 2022-02-23 RX ORDER — DULOXETIN HYDROCHLORIDE 60 MG/1
CAPSULE, DELAYED RELEASE ORAL
Qty: 30 CAPSULE | Refills: 0 | Status: SHIPPED | OUTPATIENT
Start: 2022-02-23 | End: 2022-03-24

## 2022-02-23 RX ORDER — TOPIRAMATE 25 MG/1
25 TABLET ORAL DAILY
Qty: 30 TABLET | Refills: 0 | Status: SHIPPED | OUTPATIENT
Start: 2022-02-23 | End: 2022-03-24

## 2022-02-23 RX ORDER — QUETIAPINE 150 MG/1
TABLET, FILM COATED, EXTENDED RELEASE ORAL
Qty: 30 TABLET | Refills: 0 | Status: SHIPPED | OUTPATIENT
Start: 2022-02-23 | End: 2022-03-24

## 2022-02-23 RX ORDER — PROPRANOLOL HCL 60 MG
CAPSULE, EXTENDED RELEASE 24HR ORAL
Qty: 30 CAPSULE | Refills: 0 | Status: SHIPPED | OUTPATIENT
Start: 2022-02-23 | End: 2022-03-24

## 2022-03-24 DIAGNOSIS — G43.909 MIGRAINE WITHOUT STATUS MIGRAINOSUS, NOT INTRACTABLE, UNSPECIFIED MIGRAINE TYPE: ICD-10-CM

## 2022-03-24 DIAGNOSIS — F32.9 MAJOR DEPRESSIVE DISORDER, SINGLE EPISODE, UNSPECIFIED: ICD-10-CM

## 2022-03-24 DIAGNOSIS — I10 PRIMARY HYPERTENSION: ICD-10-CM

## 2022-03-24 RX ORDER — PROPRANOLOL HCL 60 MG
CAPSULE, EXTENDED RELEASE 24HR ORAL
Qty: 30 CAPSULE | Refills: 0 | Status: SHIPPED | OUTPATIENT
Start: 2022-03-24 | End: 2022-05-10 | Stop reason: SDUPTHER

## 2022-03-24 RX ORDER — QUETIAPINE 150 MG/1
TABLET, FILM COATED, EXTENDED RELEASE ORAL
Qty: 30 TABLET | Refills: 0 | Status: SHIPPED | OUTPATIENT
Start: 2022-03-24 | End: 2022-05-10 | Stop reason: SDUPTHER

## 2022-03-24 RX ORDER — TOPIRAMATE 25 MG/1
25 TABLET ORAL DAILY
Qty: 30 TABLET | Refills: 0 | Status: SHIPPED | OUTPATIENT
Start: 2022-03-24 | End: 2022-05-10

## 2022-03-24 RX ORDER — DULOXETIN HYDROCHLORIDE 60 MG/1
CAPSULE, DELAYED RELEASE ORAL
Qty: 30 CAPSULE | Refills: 0 | Status: SHIPPED | OUTPATIENT
Start: 2022-03-24 | End: 2022-05-10 | Stop reason: SDUPTHER

## 2022-04-29 DIAGNOSIS — I10 PRIMARY HYPERTENSION: ICD-10-CM

## 2022-04-29 DIAGNOSIS — G43.909 MIGRAINE WITHOUT STATUS MIGRAINOSUS, NOT INTRACTABLE, UNSPECIFIED MIGRAINE TYPE: ICD-10-CM

## 2022-04-29 DIAGNOSIS — F32.9 MAJOR DEPRESSIVE DISORDER, SINGLE EPISODE, UNSPECIFIED: ICD-10-CM

## 2022-04-29 DIAGNOSIS — M19.90 ARTHRITIS: ICD-10-CM

## 2022-04-29 RX ORDER — PROPRANOLOL HCL 60 MG
CAPSULE, EXTENDED RELEASE 24HR ORAL
Qty: 30 CAPSULE | Refills: 0 | OUTPATIENT
Start: 2022-04-29

## 2022-04-29 RX ORDER — QUETIAPINE 150 MG/1
TABLET, FILM COATED, EXTENDED RELEASE ORAL
Qty: 30 TABLET | Refills: 0 | OUTPATIENT
Start: 2022-04-29

## 2022-04-29 RX ORDER — TOPIRAMATE 25 MG/1
TABLET ORAL
Qty: 30 TABLET | Refills: 0 | OUTPATIENT
Start: 2022-04-29

## 2022-04-29 RX ORDER — DULOXETIN HYDROCHLORIDE 60 MG/1
CAPSULE, DELAYED RELEASE ORAL
Qty: 30 CAPSULE | Refills: 0 | OUTPATIENT
Start: 2022-04-29

## 2022-04-29 RX ORDER — IBUPROFEN 800 MG/1
800 TABLET ORAL DAILY PRN
Qty: 30 TABLET | Refills: 0 | OUTPATIENT
Start: 2022-04-29

## 2022-05-10 ENCOUNTER — OFFICE VISIT (OUTPATIENT)
Dept: FAMILY MEDICINE CLINIC | Facility: CLINIC | Age: 52
End: 2022-05-10

## 2022-05-10 VITALS
HEART RATE: 82 BPM | SYSTOLIC BLOOD PRESSURE: 158 MMHG | WEIGHT: 168 LBS | TEMPERATURE: 98.3 F | BODY MASS INDEX: 27.96 KG/M2 | OXYGEN SATURATION: 98 % | DIASTOLIC BLOOD PRESSURE: 88 MMHG

## 2022-05-10 DIAGNOSIS — I10 PRIMARY HYPERTENSION: ICD-10-CM

## 2022-05-10 DIAGNOSIS — M19.90 ARTHRITIS: ICD-10-CM

## 2022-05-10 DIAGNOSIS — F32.9 MAJOR DEPRESSIVE DISORDER, SINGLE EPISODE, UNSPECIFIED: ICD-10-CM

## 2022-05-10 DIAGNOSIS — G43.909 MIGRAINE WITHOUT STATUS MIGRAINOSUS, NOT INTRACTABLE, UNSPECIFIED MIGRAINE TYPE: ICD-10-CM

## 2022-05-10 PROCEDURE — 99214 OFFICE O/P EST MOD 30 MIN: CPT | Performed by: FAMILY MEDICINE

## 2022-05-10 RX ORDER — PROPRANOLOL HCL 60 MG
60 CAPSULE, EXTENDED RELEASE 24HR ORAL DAILY
Qty: 90 CAPSULE | Refills: 1 | Status: SHIPPED | OUTPATIENT
Start: 2022-05-10 | End: 2023-01-16

## 2022-05-10 RX ORDER — IBUPROFEN 800 MG/1
800 TABLET ORAL DAILY PRN
Qty: 90 TABLET | Refills: 1 | Status: SHIPPED | OUTPATIENT
Start: 2022-05-10 | End: 2023-01-16 | Stop reason: SDUPTHER

## 2022-05-10 RX ORDER — SUMATRIPTAN 100 MG/1
TABLET, FILM COATED ORAL
Qty: 30 TABLET | Refills: 3 | Status: SHIPPED | OUTPATIENT
Start: 2022-05-10

## 2022-05-10 RX ORDER — DULOXETIN HYDROCHLORIDE 60 MG/1
60 CAPSULE, DELAYED RELEASE ORAL DAILY
Qty: 90 CAPSULE | Refills: 1 | Status: SHIPPED | OUTPATIENT
Start: 2022-05-10 | End: 2022-11-28 | Stop reason: SDUPTHER

## 2022-05-10 RX ORDER — QUETIAPINE 150 MG/1
150 TABLET, FILM COATED, EXTENDED RELEASE ORAL NIGHTLY
Qty: 90 TABLET | Refills: 1 | Status: SHIPPED | OUTPATIENT
Start: 2022-05-10 | End: 2022-11-28 | Stop reason: SDUPTHER

## 2022-05-10 NOTE — PROGRESS NOTES
Chief Complaint  Anxiety and Migraine (Refills )    Subjective          Rosie Medrano presents to [facility]  Pt gets migraine headaches now once monthly- pt tolerating all meds- pt says current meds have conditions stable    Anxiety  Presents for follow-up visit. Patient reports no chest pain, compulsions, confusion, decreased concentration, depressed mood, dizziness, dry mouth, excessive worry, feeling of choking, hyperventilation, insomnia, irritability, malaise, muscle tension, nausea, nervous/anxious behavior, obsessions, palpitations, panic, restlessness, shortness of breath or suicidal ideas. Symptoms occur occasionally. The severity of symptoms is moderate. The quality of sleep is good. Nighttime awakenings: none.     Her past medical history is significant for depression. Compliance with medications is %. Treatment side effects: no side effects.   Depression  Visit Type: follow-up  Patient is not experiencing: anhedonia, chest pain, choking sensation, compulsions, confusion, decreased concentration, depressed mood, dizziness, dry mouth, excessive worry, fatigue, feelings of hopelessness, feelings of worthlessness, hypersomnia, hyperventilation, insomnia, irritability, malaise, memory impairment, muscle tension, nausea, nervousness/anxiety, obsessions, palpitations, panic, psychomotor agitation, psychomotor retardation, restlessness, shortness of breath, suicidal ideas, suicidal planning, thoughts of death, weight gain and weight loss.  Frequency of symptoms: occasionally   Severity: moderate   Sleep quality: good  Nighttime awakenings: none  Compliance with medications:  %  Treatment side effects: no side effects.      Objective   No Known Allergies    There is no immunization history on file for this patient.  Past Medical History:   Diagnosis Date   • Depression    • Muscle spasm       Past Surgical History:   Procedure Laterality Date   •  SECTION      • INGUINAL HERNIA REPAIR        Social History     Socioeconomic History   • Marital status:    Tobacco Use   • Smoking status: Former Smoker     Packs/day: 1.00     Years: 30.00     Pack years: 30.00     Types: Cigarettes     Start date: 1987     Quit date: 2020     Years since quittin.4   • Smokeless tobacco: Never Used   Vaping Use   • Vaping Use: Never used   Substance and Sexual Activity   • Alcohol use: Yes     Comment: occasionally   • Drug use: Never        Current Outpatient Medications:   •  DULoxetine (CYMBALTA) 60 MG capsule, Take 1 capsule by mouth Daily., Disp: 90 capsule, Rfl: 1  •  ibuprofen (ADVIL,MOTRIN) 800 MG tablet, Take 1 tablet by mouth Daily As Needed for Mild Pain ., Disp: 90 tablet, Rfl: 1  •  propranolol LA (INDERAL LA) 60 MG 24 hr capsule, Take 1 capsule by mouth Daily., Disp: 90 capsule, Rfl: 1  •  QUEtiapine XR (SEROquel XR) 150 MG 24 hr tablet, Take 1 tablet by mouth Every Night., Disp: 90 tablet, Rfl: 1  •  SUMAtriptan (Imitrex) 100 MG tablet, Take one tablet at onset of headache. May repeat dose one time in 2 hours if headache not relieved., Disp: 30 tablet, Rfl: 3   Family History   Problem Relation Age of Onset   • Arthritis Other    • COPD Other    • Diabetes type II Other           Vital Signs:   Vitals:    05/10/22 1549   BP: 158/88   Pulse: 82   Temp: 98.3 °F (36.8 °C)   SpO2: 98%   Weight: 76.2 kg (168 lb)       Review of Systems   Constitutional: Negative for irritability, weight gain and weight loss.   Respiratory: Negative for choking and shortness of breath.    Cardiovascular: Negative for chest pain and palpitations.   Gastrointestinal: Negative for nausea.   Neurological: Negative for dizziness.   Psychiatric/Behavioral: Negative for confusion, decreased concentration and suicidal ideas. The patient is not nervous/anxious and does not have insomnia.       Physical Exam  Vitals reviewed.   Constitutional:       Appearance: Normal appearance. She  is well-developed.   HENT:      Head: Normocephalic and atraumatic.      Right Ear: External ear normal.      Left Ear: External ear normal.      Mouth/Throat:      Pharynx: No oropharyngeal exudate.   Eyes:      Conjunctiva/sclera: Conjunctivae normal.      Pupils: Pupils are equal, round, and reactive to light.   Cardiovascular:      Rate and Rhythm: Normal rate and regular rhythm.      Pulses: Normal pulses.      Heart sounds: Normal heart sounds. No murmur heard.    No friction rub. No gallop.   Pulmonary:      Effort: Pulmonary effort is normal.      Breath sounds: Normal breath sounds. No wheezing or rhonchi.   Abdominal:      General: Abdomen is flat. Bowel sounds are normal. There is no distension.      Palpations: Abdomen is soft. There is no mass.      Tenderness: There is no abdominal tenderness. There is no guarding or rebound.      Hernia: No hernia is present.   Musculoskeletal:         General: Normal range of motion.   Skin:     General: Skin is warm and dry.      Capillary Refill: Capillary refill takes less than 2 seconds.   Neurological:      General: No focal deficit present.      Mental Status: She is alert and oriented to person, place, and time.      Cranial Nerves: No cranial nerve deficit.   Psychiatric:         Mood and Affect: Mood and affect normal.         Behavior: Behavior normal.         Thought Content: Thought content normal.         Judgment: Judgment normal.        Result Review :   The following data was reviewed by: Andrés Marcial MD on 05/10/2022:  CMP    CMP 11/19/21   Glucose 84   BUN 11   Creatinine 0.20 (A)   eGFR Non African Am >150   Sodium 137   Potassium 4.4   Chloride 102   Calcium 9.7   Albumin 4.80   Total Bilirubin 0.4   Alkaline Phosphatase 116   AST (SGOT) 26   ALT (SGPT) 17   (A) Abnormal value       Comments are available for some flowsheets but are not being displayed.           CBC    CBC 11/19/21   WBC 10.64   RBC 4.92   Hemoglobin 15.8   Hematocrit 48.0 (A)    MCV 97.6 (A)   MCH 32.1   MCHC 32.9   RDW 12.4   Platelets 322   (A) Abnormal value                TSH    TSH 11/19/21   TSH 1.890                     Assessment and Plan    Diagnoses and all orders for this visit:    1. Major depressive disorder, single episode, unspecified  -     DULoxetine (CYMBALTA) 60 MG capsule; Take 1 capsule by mouth Daily.  Dispense: 90 capsule; Refill: 1  -     QUEtiapine XR (SEROquel XR) 150 MG 24 hr tablet; Take 1 tablet by mouth Every Night.  Dispense: 90 tablet; Refill: 1    2. Arthritis  -     ibuprofen (ADVIL,MOTRIN) 800 MG tablet; Take 1 tablet by mouth Daily As Needed for Mild Pain .  Dispense: 90 tablet; Refill: 1    3. Primary hypertension  -     propranolol LA (INDERAL LA) 60 MG 24 hr capsule; Take 1 capsule by mouth Daily.  Dispense: 90 capsule; Refill: 1  -     CBC Auto Differential  -     Comprehensive Metabolic Panel  -     Lipid Panel  -     TSH+Free T4    4. Migraine without status migrainosus, not intractable, unspecified migraine type  -     SUMAtriptan (Imitrex) 100 MG tablet; Take one tablet at onset of headache. May repeat dose one time in 2 hours if headache not relieved.  Dispense: 30 tablet; Refill: 3            Follow Up   Return in about 6 months (around 11/10/2022) for Recheck.  Patient was given instructions and counseling regarding her condition or for health maintenance advice. Please see specific information pulled into the AVS if appropriate.

## 2022-07-11 DIAGNOSIS — G43.909 MIGRAINE WITHOUT STATUS MIGRAINOSUS, NOT INTRACTABLE, UNSPECIFIED MIGRAINE TYPE: ICD-10-CM

## 2022-07-11 RX ORDER — TOPIRAMATE 25 MG/1
TABLET ORAL
Qty: 30 TABLET | Refills: 0 | OUTPATIENT
Start: 2022-07-11

## 2022-08-08 ENCOUNTER — TELEPHONE (OUTPATIENT)
Dept: FAMILY MEDICINE CLINIC | Facility: CLINIC | Age: 52
End: 2022-08-08

## 2022-08-10 ENCOUNTER — CLINICAL SUPPORT (OUTPATIENT)
Dept: FAMILY MEDICINE CLINIC | Facility: CLINIC | Age: 52
End: 2022-08-10

## 2022-08-10 DIAGNOSIS — I15.9 SECONDARY HYPERTENSION: Primary | ICD-10-CM

## 2022-08-10 DIAGNOSIS — I10 PRIMARY HYPERTENSION: ICD-10-CM

## 2022-08-10 LAB
ALBUMIN SERPL-MCNC: 4.5 G/DL (ref 3.5–5.2)
ALBUMIN/GLOB SERPL: 2.3 G/DL
ALP SERPL-CCNC: 110 U/L (ref 39–117)
ALT SERPL W P-5'-P-CCNC: 11 U/L (ref 1–33)
ANION GAP SERPL CALCULATED.3IONS-SCNC: 11.2 MMOL/L (ref 5–15)
AST SERPL-CCNC: 17 U/L (ref 1–32)
BASOPHILS # BLD AUTO: 0.09 10*3/MM3 (ref 0–0.2)
BASOPHILS NFR BLD AUTO: 1.1 % (ref 0–1.5)
BILIRUB SERPL-MCNC: 0.4 MG/DL (ref 0–1.2)
BUN SERPL-MCNC: 11 MG/DL (ref 6–20)
BUN/CREAT SERPL: 12 (ref 7–25)
CALCIUM SPEC-SCNC: 9.9 MG/DL (ref 8.6–10.5)
CHLORIDE SERPL-SCNC: 102 MMOL/L (ref 98–107)
CHOLEST SERPL-MCNC: 161 MG/DL (ref 0–200)
CO2 SERPL-SCNC: 26.8 MMOL/L (ref 22–29)
CREAT SERPL-MCNC: 0.92 MG/DL (ref 0.57–1)
DEPRECATED RDW RBC AUTO: 42.7 FL (ref 37–54)
EGFRCR SERPLBLD CKD-EPI 2021: 75.5 ML/MIN/1.73
EOSINOPHIL # BLD AUTO: 0.35 10*3/MM3 (ref 0–0.4)
EOSINOPHIL NFR BLD AUTO: 4.3 % (ref 0.3–6.2)
ERYTHROCYTE [DISTWIDTH] IN BLOOD BY AUTOMATED COUNT: 12.5 % (ref 12.3–15.4)
GLOBULIN UR ELPH-MCNC: 2 GM/DL
GLUCOSE SERPL-MCNC: 95 MG/DL (ref 65–99)
HCT VFR BLD AUTO: 45.9 % (ref 34–46.6)
HDLC SERPL-MCNC: 30 MG/DL (ref 40–60)
HGB BLD-MCNC: 15.6 G/DL (ref 12–15.9)
IMM GRANULOCYTES # BLD AUTO: 0.03 10*3/MM3 (ref 0–0.05)
IMM GRANULOCYTES NFR BLD AUTO: 0.4 % (ref 0–0.5)
LDLC SERPL CALC-MCNC: 99 MG/DL (ref 0–100)
LDLC/HDLC SERPL: 3.16 {RATIO}
LYMPHOCYTES # BLD AUTO: 2.57 10*3/MM3 (ref 0.7–3.1)
LYMPHOCYTES NFR BLD AUTO: 31.6 % (ref 19.6–45.3)
MCH RBC QN AUTO: 31.8 PG (ref 26.6–33)
MCHC RBC AUTO-ENTMCNC: 34 G/DL (ref 31.5–35.7)
MCV RBC AUTO: 93.7 FL (ref 79–97)
MONOCYTES # BLD AUTO: 0.56 10*3/MM3 (ref 0.1–0.9)
MONOCYTES NFR BLD AUTO: 6.9 % (ref 5–12)
NEUTROPHILS NFR BLD AUTO: 4.53 10*3/MM3 (ref 1.7–7)
NEUTROPHILS NFR BLD AUTO: 55.7 % (ref 42.7–76)
NRBC BLD AUTO-RTO: 0 /100 WBC (ref 0–0.2)
PLATELET # BLD AUTO: 302 10*3/MM3 (ref 140–450)
PMV BLD AUTO: 9.9 FL (ref 6–12)
POTASSIUM SERPL-SCNC: 4.4 MMOL/L (ref 3.5–5.2)
PROT SERPL-MCNC: 6.5 G/DL (ref 6–8.5)
RBC # BLD AUTO: 4.9 10*6/MM3 (ref 3.77–5.28)
SODIUM SERPL-SCNC: 140 MMOL/L (ref 136–145)
T4 FREE SERPL-MCNC: 1.21 NG/DL (ref 0.93–1.7)
TRIGL SERPL-MCNC: 181 MG/DL (ref 0–150)
TSH SERPL DL<=0.05 MIU/L-ACNC: 1.84 UIU/ML (ref 0.27–4.2)
VLDLC SERPL-MCNC: 32 MG/DL (ref 5–40)
WBC NRBC COR # BLD: 8.13 10*3/MM3 (ref 3.4–10.8)

## 2022-08-10 PROCEDURE — 80061 LIPID PANEL: CPT | Performed by: FAMILY MEDICINE

## 2022-08-10 PROCEDURE — 86618 LYME DISEASE ANTIBODY: CPT | Performed by: FAMILY MEDICINE

## 2022-08-10 PROCEDURE — 80050 GENERAL HEALTH PANEL: CPT | Performed by: FAMILY MEDICINE

## 2022-08-10 PROCEDURE — 84439 ASSAY OF FREE THYROXINE: CPT | Performed by: FAMILY MEDICINE

## 2022-08-10 PROCEDURE — 36415 COLL VENOUS BLD VENIPUNCTURE: CPT | Performed by: FAMILY MEDICINE

## 2022-08-10 NOTE — PROGRESS NOTES
Venipuncture Blood Specimen Collection  Venipuncture performed in left arm  by Kristin Malcolm with good hemostasis. Patient tolerated the procedure well without complications.   08/10/22   Kristin Malcolm

## 2022-08-11 LAB — B BURGDOR IGG+IGM SER QL IA: NEGATIVE

## 2022-11-12 DIAGNOSIS — F32.9 MAJOR DEPRESSIVE DISORDER, SINGLE EPISODE, UNSPECIFIED: ICD-10-CM

## 2022-11-12 DIAGNOSIS — I10 PRIMARY HYPERTENSION: ICD-10-CM

## 2022-11-12 DIAGNOSIS — M19.90 ARTHRITIS: ICD-10-CM

## 2022-11-14 RX ORDER — QUETIAPINE 150 MG/1
150 TABLET, FILM COATED, EXTENDED RELEASE ORAL NIGHTLY
Qty: 90 TABLET | Refills: 1 | OUTPATIENT
Start: 2022-11-14

## 2022-11-14 RX ORDER — PROPRANOLOL HCL 60 MG
60 CAPSULE, EXTENDED RELEASE 24HR ORAL DAILY
Qty: 90 CAPSULE | Refills: 1 | OUTPATIENT
Start: 2022-11-14

## 2022-11-14 RX ORDER — IBUPROFEN 800 MG/1
800 TABLET ORAL DAILY PRN
Qty: 90 TABLET | Refills: 1 | OUTPATIENT
Start: 2022-11-14

## 2022-11-14 RX ORDER — DULOXETIN HYDROCHLORIDE 60 MG/1
60 CAPSULE, DELAYED RELEASE ORAL DAILY
Qty: 90 CAPSULE | Refills: 1 | OUTPATIENT
Start: 2022-11-14

## 2022-11-28 DIAGNOSIS — F32.9 MAJOR DEPRESSIVE DISORDER, SINGLE EPISODE, UNSPECIFIED: ICD-10-CM

## 2022-11-28 RX ORDER — DULOXETIN HYDROCHLORIDE 60 MG/1
60 CAPSULE, DELAYED RELEASE ORAL DAILY
Qty: 30 CAPSULE | Refills: 0 | Status: SHIPPED | OUTPATIENT
Start: 2022-11-28 | End: 2022-12-27 | Stop reason: SDUPTHER

## 2022-11-28 RX ORDER — QUETIAPINE 150 MG/1
150 TABLET, FILM COATED, EXTENDED RELEASE ORAL NIGHTLY
Qty: 30 TABLET | Refills: 0 | Status: SHIPPED | OUTPATIENT
Start: 2022-11-28 | End: 2022-12-27 | Stop reason: SDUPTHER

## 2022-12-27 DIAGNOSIS — F32.9 MAJOR DEPRESSIVE DISORDER, SINGLE EPISODE, UNSPECIFIED: ICD-10-CM

## 2022-12-27 RX ORDER — QUETIAPINE 150 MG/1
150 TABLET, FILM COATED, EXTENDED RELEASE ORAL NIGHTLY
Qty: 30 TABLET | Refills: 0 | Status: SHIPPED | OUTPATIENT
Start: 2022-12-27 | End: 2023-01-16 | Stop reason: SDUPTHER

## 2022-12-27 RX ORDER — DULOXETIN HYDROCHLORIDE 60 MG/1
60 CAPSULE, DELAYED RELEASE ORAL DAILY
Qty: 30 CAPSULE | Refills: 0 | Status: SHIPPED | OUTPATIENT
Start: 2022-12-27 | End: 2023-01-16 | Stop reason: SDUPTHER

## 2022-12-27 NOTE — TELEPHONE ENCOUNTER
Caller: Mitchell Rosie Lee    Relationship: Self    Best call back number: [phone]    Requested Prescriptions:   Requested Prescriptions     Pending Prescriptions Disp Refills   • QUEtiapine XR (SEROquel XR) 150 MG 24 hr tablet 30 tablet 0     Sig: Take 1 tablet by mouth Every Night.   • DULoxetine (CYMBALTA) 60 MG capsule 30 capsule 0     Sig: Take 1 capsule by mouth Daily.        Pharmacy where request should be sent: [address] [phone] [facility] [phone] [facility]     Additional details provided by patient: PATIENT IS RUNNING OUT OF MEDICATION. SHE HAS SCHEDULED THE FIRST AVAILABLE APPOINTMENT WITH PCP ON 01/16/23    Does the patient have less than a 3 day supply:  [x] Yes  [] No    Would you like a call back once the refill request has been completed: [x] Yes [] No    If the office needs to give you a call back, can they leave a voicemail: [] Yes [x] No    Renny Solorio Rep   12/27/22 11:32 EST

## 2023-01-16 ENCOUNTER — OFFICE VISIT (OUTPATIENT)
Dept: FAMILY MEDICINE CLINIC | Facility: CLINIC | Age: 53
End: 2023-01-16
Payer: COMMERCIAL

## 2023-01-16 VITALS
OXYGEN SATURATION: 97 % | WEIGHT: 165.2 LBS | DIASTOLIC BLOOD PRESSURE: 80 MMHG | HEART RATE: 75 BPM | BODY MASS INDEX: 27.49 KG/M2 | SYSTOLIC BLOOD PRESSURE: 120 MMHG | TEMPERATURE: 97.8 F

## 2023-01-16 DIAGNOSIS — E78.2 MIXED HYPERLIPIDEMIA: Primary | ICD-10-CM

## 2023-01-16 DIAGNOSIS — M54.50 ACUTE RIGHT-SIDED LOW BACK PAIN WITHOUT SCIATICA: ICD-10-CM

## 2023-01-16 DIAGNOSIS — R53.83 FATIGUE, UNSPECIFIED TYPE: ICD-10-CM

## 2023-01-16 DIAGNOSIS — F32.9 MAJOR DEPRESSIVE DISORDER, SINGLE EPISODE, UNSPECIFIED: ICD-10-CM

## 2023-01-16 DIAGNOSIS — M19.90 ARTHRITIS: ICD-10-CM

## 2023-01-16 DIAGNOSIS — E55.9 VITAMIN D DEFICIENCY: ICD-10-CM

## 2023-01-16 LAB
25(OH)D3 SERPL-MCNC: 32.7 NG/ML (ref 30–100)
BASOPHILS # BLD AUTO: 0.09 10*3/MM3 (ref 0–0.2)
BASOPHILS NFR BLD AUTO: 0.8 % (ref 0–1.5)
DEPRECATED RDW RBC AUTO: 42.3 FL (ref 37–54)
EOSINOPHIL # BLD AUTO: 0.4 10*3/MM3 (ref 0–0.4)
EOSINOPHIL NFR BLD AUTO: 3.7 % (ref 0.3–6.2)
ERYTHROCYTE [DISTWIDTH] IN BLOOD BY AUTOMATED COUNT: 12.2 % (ref 12.3–15.4)
FOLATE SERPL-MCNC: 9.07 NG/ML (ref 4.78–24.2)
HCT VFR BLD AUTO: 45.8 % (ref 34–46.6)
HGB BLD-MCNC: 15.5 G/DL (ref 12–15.9)
IMM GRANULOCYTES # BLD AUTO: 0.03 10*3/MM3 (ref 0–0.05)
IMM GRANULOCYTES NFR BLD AUTO: 0.3 % (ref 0–0.5)
LYMPHOCYTES # BLD AUTO: 3.4 10*3/MM3 (ref 0.7–3.1)
LYMPHOCYTES NFR BLD AUTO: 31.5 % (ref 19.6–45.3)
MCH RBC QN AUTO: 31.9 PG (ref 26.6–33)
MCHC RBC AUTO-ENTMCNC: 33.8 G/DL (ref 31.5–35.7)
MCV RBC AUTO: 94.2 FL (ref 79–97)
MONOCYTES # BLD AUTO: 0.57 10*3/MM3 (ref 0.1–0.9)
MONOCYTES NFR BLD AUTO: 5.3 % (ref 5–12)
NEUTROPHILS NFR BLD AUTO: 58.4 % (ref 42.7–76)
NEUTROPHILS NFR BLD AUTO: 6.31 10*3/MM3 (ref 1.7–7)
NRBC BLD AUTO-RTO: 0 /100 WBC (ref 0–0.2)
PLATELET # BLD AUTO: 304 10*3/MM3 (ref 140–450)
PMV BLD AUTO: 10.1 FL (ref 6–12)
RBC # BLD AUTO: 4.86 10*6/MM3 (ref 3.77–5.28)
VIT B12 BLD-MCNC: 688 PG/ML (ref 211–946)
WBC NRBC COR # BLD: 10.8 10*3/MM3 (ref 3.4–10.8)

## 2023-01-16 PROCEDURE — 82746 ASSAY OF FOLIC ACID SERUM: CPT | Performed by: FAMILY MEDICINE

## 2023-01-16 PROCEDURE — 99214 OFFICE O/P EST MOD 30 MIN: CPT | Performed by: FAMILY MEDICINE

## 2023-01-16 PROCEDURE — 80061 LIPID PANEL: CPT | Performed by: FAMILY MEDICINE

## 2023-01-16 PROCEDURE — 84439 ASSAY OF FREE THYROXINE: CPT | Performed by: FAMILY MEDICINE

## 2023-01-16 PROCEDURE — 80050 GENERAL HEALTH PANEL: CPT | Performed by: FAMILY MEDICINE

## 2023-01-16 PROCEDURE — 82607 VITAMIN B-12: CPT | Performed by: FAMILY MEDICINE

## 2023-01-16 PROCEDURE — 82306 VITAMIN D 25 HYDROXY: CPT | Performed by: FAMILY MEDICINE

## 2023-01-16 RX ORDER — BACLOFEN 10 MG/1
10 TABLET ORAL 3 TIMES DAILY PRN
Qty: 30 TABLET | Refills: 0 | Status: SHIPPED | OUTPATIENT
Start: 2023-01-16

## 2023-01-16 RX ORDER — IBUPROFEN 800 MG/1
800 TABLET ORAL DAILY PRN
Qty: 30 TABLET | Refills: 5 | Status: SHIPPED | OUTPATIENT
Start: 2023-01-16

## 2023-01-16 RX ORDER — QUETIAPINE 150 MG/1
150 TABLET, FILM COATED, EXTENDED RELEASE ORAL NIGHTLY
Qty: 30 TABLET | Refills: 5 | Status: SHIPPED | OUTPATIENT
Start: 2023-01-16

## 2023-01-16 RX ORDER — DULOXETIN HYDROCHLORIDE 60 MG/1
60 CAPSULE, DELAYED RELEASE ORAL DAILY
Qty: 30 CAPSULE | Refills: 5 | Status: SHIPPED | OUTPATIENT
Start: 2023-01-16

## 2023-01-16 NOTE — PROGRESS NOTES
Chief Complaint  Depression (Medicine refill)    Subjective          Rosie Medrano presents to [facility]  Depression  Visit Type: follow-up  Patient presents with the following symptoms: fatigue.  Patient is not experiencing: anhedonia, chest pain, choking sensation, compulsions, confusion, decreased concentration, depressed mood, dizziness, dry mouth, excessive worry, feelings of hopelessness, feelings of worthlessness, hypersomnia, hyperventilation, insomnia, irritability, malaise, memory impairment, muscle tension, nausea, nervousness/anxiety, obsessions, palpitations, panic, psychomotor agitation, psychomotor retardation, restlessness, shortness of breath, suicidal ideas, suicidal planning, thoughts of death, weight gain and weight loss.  Frequency of symptoms: occasionally   Severity: moderate   Sleep quality: good  Nighttime awakenings: none  Compliance with medications:  %  Treatment side effects: no side effects.  Fatigue  This is a new problem. The current episode started more than 1 month ago. The problem occurs constantly. The problem has been unchanged. Associated symptoms include arthralgias and fatigue. Pertinent negatives include no abdominal pain, anorexia, change in bowel habit, chest pain, chills, congestion, coughing, diaphoresis, fever, headaches, joint swelling, myalgias, nausea, neck pain, numbness, rash, sore throat, swollen glands, urinary symptoms, vertigo, visual change, vomiting or weakness. Nothing aggravates the symptoms. She has tried nothing for the symptoms.       Objective   No Known Allergies    There is no immunization history on file for this patient.  Past Medical History:   Diagnosis Date   • Depression    • Muscle spasm       Past Surgical History:   Procedure Laterality Date   •  SECTION     • INGUINAL HERNIA REPAIR        Social History     Socioeconomic History   • Marital status:    Tobacco Use   • Smoking status:  Former     Packs/day: 1.00     Years: 30.00     Pack years: 30.00     Types: Cigarettes     Start date: 1987     Quit date: 2020     Years since quittin.1   • Smokeless tobacco: Never   Vaping Use   • Vaping Use: Never used   Substance and Sexual Activity   • Alcohol use: Yes     Comment: occasionally   • Drug use: Never        Current Outpatient Medications:   •  DULoxetine (CYMBALTA) 60 MG capsule, Take 1 capsule by mouth Daily., Disp: 30 capsule, Rfl: 5  •  ibuprofen (ADVIL,MOTRIN) 800 MG tablet, Take 1 tablet by mouth Daily As Needed for Mild Pain., Disp: 30 tablet, Rfl: 5  •  QUEtiapine XR (SEROquel XR) 150 MG 24 hr tablet, Take 1 tablet by mouth Every Night., Disp: 30 tablet, Rfl: 5  •  SUMAtriptan (Imitrex) 100 MG tablet, Take one tablet at onset of headache. May repeat dose one time in 2 hours if headache not relieved., Disp: 30 tablet, Rfl: 3  •  baclofen (LIORESAL) 10 MG tablet, Take 1 tablet by mouth 3 (Three) Times a Day As Needed for Muscle Spasms., Disp: 30 tablet, Rfl: 0   Family History   Problem Relation Age of Onset   • Arthritis Other    • COPD Other    • Diabetes type II Other           Vital Signs:   Vitals:    23 1633   BP: 120/80   Pulse: 75   Temp: 97.8 °F (36.6 °C)   SpO2: 97%   Weight: 74.9 kg (165 lb 3.2 oz)       Review of Systems   Constitutional: Positive for fatigue. Negative for chills, diaphoresis, fever, irritability, weight gain and weight loss.   HENT: Negative for congestion and sore throat.    Respiratory: Negative for cough, choking and shortness of breath.    Cardiovascular: Negative for chest pain and palpitations.   Gastrointestinal: Negative for abdominal pain, anorexia, change in bowel habit, nausea and vomiting.   Musculoskeletal: Positive for arthralgias. Negative for joint swelling, myalgias and neck pain.   Skin: Negative for rash.   Neurological: Negative for vertigo, weakness, numbness and headaches.   Psychiatric/Behavioral: Negative for  confusion, decreased concentration and suicidal ideas. The patient is not nervous/anxious and does not have insomnia.       Physical Exam  Vitals reviewed.   Constitutional:       Appearance: Normal appearance. She is well-developed.   HENT:      Head: Normocephalic and atraumatic.      Right Ear: External ear normal.      Left Ear: External ear normal.      Mouth/Throat:      Pharynx: No oropharyngeal exudate.   Eyes:      Conjunctiva/sclera: Conjunctivae normal.      Pupils: Pupils are equal, round, and reactive to light.   Cardiovascular:      Rate and Rhythm: Normal rate and regular rhythm.      Pulses: Normal pulses.      Heart sounds: Normal heart sounds. No murmur heard.    No friction rub. No gallop.   Pulmonary:      Effort: Pulmonary effort is normal.      Breath sounds: Normal breath sounds. No wheezing or rhonchi.   Abdominal:      General: Abdomen is flat. Bowel sounds are normal. There is no distension.      Palpations: Abdomen is soft. There is no mass.      Tenderness: There is no abdominal tenderness. There is no guarding or rebound.      Hernia: No hernia is present.   Musculoskeletal:         General: Normal range of motion.      Comments: Right lower back paraspinal muscle tenderness, neg vertebrae tenderness, neg straight leg raise, no redness, warmth, swelling, or bruising.   Skin:     General: Skin is warm and dry.      Capillary Refill: Capillary refill takes less than 2 seconds.   Neurological:      General: No focal deficit present.      Mental Status: She is alert and oriented to person, place, and time.      Cranial Nerves: No cranial nerve deficit.   Psychiatric:         Mood and Affect: Mood and affect normal.         Behavior: Behavior normal.         Thought Content: Thought content normal.         Judgment: Judgment normal.        Result Review :   The following data was reviewed by: Andrés Marcial MD on 01/16/2023:  CMP    CMP 8/10/22   Glucose 95   BUN 11   Creatinine 0.92   eGFR  75.5   Sodium 140   Potassium 4.4   Chloride 102   Calcium 9.9   Total Protein 6.5   Albumin 4.50   Globulin 2.0   Total Bilirubin 0.4   Alkaline Phosphatase 110   AST (SGOT) 17   ALT (SGPT) 11   Albumin/Globulin Ratio 2.3   BUN/Creatinine Ratio 12.0   Anion Gap 11.2      Comments are available for some flowsheets but are not being displayed.           CBC    CBC 8/10/22   WBC 8.13   RBC 4.90   Hemoglobin 15.6   Hematocrit 45.9   MCV 93.7   MCH 31.8   MCHC 34.0   RDW 12.5   Platelets 302           Lipid Panel    Lipid Panel 8/10/22   Total Cholesterol 161   Triglycerides 181 (A)   HDL Cholesterol 30 (A)   VLDL Cholesterol 32   LDL Cholesterol  99   LDL/HDL Ratio 3.16   (A) Abnormal value            TSH    TSH 8/10/22   TSH 1.840                     Assessment and Plan    Diagnoses and all orders for this visit:    1. Mixed hyperlipidemia (Primary)  -     Lipid Panel    2. Major depressive disorder, single episode, unspecified  -     QUEtiapine XR (SEROquel XR) 150 MG 24 hr tablet; Take 1 tablet by mouth Every Night.  Dispense: 30 tablet; Refill: 5  -     DULoxetine (CYMBALTA) 60 MG capsule; Take 1 capsule by mouth Daily.  Dispense: 30 capsule; Refill: 5    3. Arthritis  -     ibuprofen (ADVIL,MOTRIN) 800 MG tablet; Take 1 tablet by mouth Daily As Needed for Mild Pain.  Dispense: 30 tablet; Refill: 5    4. Vitamin D deficiency  -     Vitamin D,25-Hydroxy    5. Fatigue, unspecified type  -     CBC Auto Differential  -     Comprehensive Metabolic Panel  -     TSH+Free T4  -     Vitamin B12 & Folate    6. Acute right-sided low back pain without sciatica  -     baclofen (LIORESAL) 10 MG tablet; Take 1 tablet by mouth 3 (Three) Times a Day As Needed for Muscle Spasms.  Dispense: 30 tablet; Refill: 0            Follow Up   Return in about 2 weeks (around 1/30/2023) for Recheck.  Patient was given instructions and counseling regarding her condition or for health maintenance advice. Please see specific information pulled  into the AVS if appropriate.       Answers for HPI/ROS submitted by the patient on 1/9/2023  Please describe your symptoms.: Refill on medication ( no problems) just need refills  Have you had these symptoms before?: No  How long have you been having these symptoms?: 1-4 days  What is the primary reason for your visit?: Other

## 2023-01-16 NOTE — PROGRESS NOTES
Venipuncture Blood Specimen Collection  Venipuncture performed in left arm by Candida Ferrell with good hemostasis. Patient tolerated the procedure well without complications.   01/16/23   Candida Ferrell

## 2023-01-17 LAB
ALBUMIN SERPL-MCNC: 4.1 G/DL (ref 3.5–5.2)
ALBUMIN/GLOB SERPL: 1.6 G/DL
ALP SERPL-CCNC: 105 U/L (ref 39–117)
ALT SERPL W P-5'-P-CCNC: 10 U/L (ref 1–33)
ANION GAP SERPL CALCULATED.3IONS-SCNC: 11.1 MMOL/L (ref 5–15)
AST SERPL-CCNC: 16 U/L (ref 1–32)
BILIRUB SERPL-MCNC: 0.3 MG/DL (ref 0–1.2)
BUN SERPL-MCNC: 14 MG/DL (ref 6–20)
BUN/CREAT SERPL: 19.7 (ref 7–25)
CALCIUM SPEC-SCNC: 9.4 MG/DL (ref 8.6–10.5)
CHLORIDE SERPL-SCNC: 106 MMOL/L (ref 98–107)
CHOLEST SERPL-MCNC: 167 MG/DL (ref 0–200)
CO2 SERPL-SCNC: 24.9 MMOL/L (ref 22–29)
CREAT SERPL-MCNC: 0.71 MG/DL (ref 0.57–1)
EGFRCR SERPLBLD CKD-EPI 2021: 103.1 ML/MIN/1.73
GLOBULIN UR ELPH-MCNC: 2.5 GM/DL
GLUCOSE SERPL-MCNC: 89 MG/DL (ref 65–99)
HDLC SERPL-MCNC: 35 MG/DL (ref 40–60)
LDLC SERPL CALC-MCNC: 111 MG/DL (ref 0–100)
LDLC/HDLC SERPL: 3.11 {RATIO}
POTASSIUM SERPL-SCNC: 4.1 MMOL/L (ref 3.5–5.2)
PROT SERPL-MCNC: 6.6 G/DL (ref 6–8.5)
SODIUM SERPL-SCNC: 142 MMOL/L (ref 136–145)
T4 FREE SERPL-MCNC: 1.08 NG/DL (ref 0.93–1.7)
TRIGL SERPL-MCNC: 116 MG/DL (ref 0–150)
TSH SERPL DL<=0.05 MIU/L-ACNC: 1.93 UIU/ML (ref 0.27–4.2)
VLDLC SERPL-MCNC: 21 MG/DL (ref 5–40)

## 2023-04-27 ENCOUNTER — HOSPITAL ENCOUNTER (OUTPATIENT)
Dept: CT IMAGING | Facility: HOSPITAL | Age: 53
Discharge: HOME | End: 2023-04-27
Payer: COMMERCIAL

## 2023-04-27 ENCOUNTER — OFFICE VISIT (OUTPATIENT)
Dept: FAMILY MEDICINE CLINIC | Facility: CLINIC | Age: 53
End: 2023-04-27
Payer: COMMERCIAL

## 2023-04-27 VITALS
HEART RATE: 55 BPM | HEIGHT: 66 IN | TEMPERATURE: 97.5 F | SYSTOLIC BLOOD PRESSURE: 138 MMHG | BODY MASS INDEX: 25.17 KG/M2 | DIASTOLIC BLOOD PRESSURE: 82 MMHG | OXYGEN SATURATION: 99 % | WEIGHT: 156.6 LBS

## 2023-04-27 DIAGNOSIS — R31.9 HEMATURIA, UNSPECIFIED TYPE: ICD-10-CM

## 2023-04-27 DIAGNOSIS — M54.50 ACUTE LEFT-SIDED LOW BACK PAIN WITHOUT SCIATICA: ICD-10-CM

## 2023-04-27 DIAGNOSIS — N30.01 ACUTE CYSTITIS WITH HEMATURIA: ICD-10-CM

## 2023-04-27 DIAGNOSIS — R31.0 GROSS HEMATURIA: Primary | ICD-10-CM

## 2023-04-27 LAB
ALBUMIN SERPL-MCNC: 4.5 G/DL (ref 3.5–5.2)
ALBUMIN/GLOB SERPL: 2 G/DL
ALP SERPL-CCNC: 106 U/L (ref 39–117)
ALT SERPL W P-5'-P-CCNC: 14 U/L (ref 1–33)
ANION GAP SERPL CALCULATED.3IONS-SCNC: 11.5 MMOL/L (ref 5–15)
AST SERPL-CCNC: 15 U/L (ref 1–32)
BASOPHILS # BLD AUTO: 0.08 10*3/MM3 (ref 0–0.2)
BASOPHILS NFR BLD AUTO: 0.9 % (ref 0–1.5)
BILIRUB BLD-MCNC: ABNORMAL MG/DL
BILIRUB SERPL-MCNC: 0.5 MG/DL (ref 0–1.2)
BUN SERPL-MCNC: 13 MG/DL (ref 6–20)
BUN/CREAT SERPL: 12.3 (ref 7–25)
CALCIUM SPEC-SCNC: 10 MG/DL (ref 8.6–10.5)
CHLORIDE SERPL-SCNC: 104 MMOL/L (ref 98–107)
CLARITY, POC: ABNORMAL
CO2 SERPL-SCNC: 24.5 MMOL/L (ref 22–29)
COLOR UR: ABNORMAL
CREAT SERPL-MCNC: 1.06 MG/DL (ref 0.57–1)
DEPRECATED RDW RBC AUTO: 42.7 FL (ref 37–54)
EGFRCR SERPLBLD CKD-EPI 2021: 63.7 ML/MIN/1.73
EOSINOPHIL # BLD AUTO: 0.27 10*3/MM3 (ref 0–0.4)
EOSINOPHIL NFR BLD AUTO: 2.9 % (ref 0.3–6.2)
ERYTHROCYTE [DISTWIDTH] IN BLOOD BY AUTOMATED COUNT: 12.6 % (ref 12.3–15.4)
GLOBULIN UR ELPH-MCNC: 2.2 GM/DL
GLUCOSE SERPL-MCNC: 122 MG/DL (ref 65–99)
GLUCOSE UR STRIP-MCNC: NEGATIVE MG/DL
HCT VFR BLD AUTO: 45.7 % (ref 34–46.6)
HGB BLD-MCNC: 15.8 G/DL (ref 12–15.9)
IMM GRANULOCYTES # BLD AUTO: 0.02 10*3/MM3 (ref 0–0.05)
IMM GRANULOCYTES NFR BLD AUTO: 0.2 % (ref 0–0.5)
KETONES UR QL: NEGATIVE
LEUKOCYTE EST, POC: NEGATIVE
LYMPHOCYTES # BLD AUTO: 3.8 10*3/MM3 (ref 0.7–3.1)
LYMPHOCYTES NFR BLD AUTO: 41.3 % (ref 19.6–45.3)
MCH RBC QN AUTO: 32.2 PG (ref 26.6–33)
MCHC RBC AUTO-ENTMCNC: 34.6 G/DL (ref 31.5–35.7)
MCV RBC AUTO: 93.3 FL (ref 79–97)
MONOCYTES # BLD AUTO: 0.69 10*3/MM3 (ref 0.1–0.9)
MONOCYTES NFR BLD AUTO: 7.5 % (ref 5–12)
NEUTROPHILS NFR BLD AUTO: 4.34 10*3/MM3 (ref 1.7–7)
NEUTROPHILS NFR BLD AUTO: 47.2 % (ref 42.7–76)
NITRITE UR-MCNC: NEGATIVE MG/ML
NRBC BLD AUTO-RTO: 0 /100 WBC (ref 0–0.2)
PH UR: 5 [PH] (ref 5–8)
PLATELET # BLD AUTO: 324 10*3/MM3 (ref 140–450)
PMV BLD AUTO: 10.2 FL (ref 6–12)
POTASSIUM SERPL-SCNC: 3.8 MMOL/L (ref 3.5–5.2)
PROT SERPL-MCNC: 6.7 G/DL (ref 6–8.5)
PROT UR STRIP-MCNC: ABNORMAL MG/DL
RBC # BLD AUTO: 4.9 10*6/MM3 (ref 3.77–5.28)
RBC # UR STRIP: ABNORMAL /UL
SODIUM SERPL-SCNC: 140 MMOL/L (ref 136–145)
SP GR UR: 1.03 (ref 1–1.03)
UROBILINOGEN UR QL: ABNORMAL
WBC NRBC COR # BLD: 9.2 10*3/MM3 (ref 3.4–10.8)

## 2023-04-27 PROCEDURE — 85025 COMPLETE CBC W/AUTO DIFF WBC: CPT | Performed by: FAMILY MEDICINE

## 2023-04-27 PROCEDURE — 80053 COMPREHEN METABOLIC PANEL: CPT | Performed by: FAMILY MEDICINE

## 2023-04-27 PROCEDURE — 87086 URINE CULTURE/COLONY COUNT: CPT | Performed by: FAMILY MEDICINE

## 2023-04-27 PROCEDURE — 74176 CT ABD & PELVIS W/O CONTRAST: CPT

## 2023-04-27 RX ORDER — NITROFURANTOIN 25; 75 MG/1; MG/1
100 CAPSULE ORAL 2 TIMES DAILY
Qty: 14 CAPSULE | Refills: 0 | Status: SHIPPED | OUTPATIENT
Start: 2023-04-27 | End: 2023-05-04

## 2023-04-27 NOTE — PROGRESS NOTES
Chief Complaint  Blood in Urine (Blood in urine for two days )    Subjective          Rosie Medrano presents to [facility]  Blood in Urine  This is a new problem. The current episode started yesterday. The problem is unchanged. She describes the hematuria as gross hematuria. She reports no clotting in her urine stream. She is experiencing no pain. Irritative symptoms do not include frequency, nocturia or urgency. Obstructive symptoms do not include dribbling, incomplete emptying, an intermittent stream, a slower stream, straining or a weak stream. Pertinent negatives include no abdominal pain, bladder pain, bone pain, chills, dysuria, facial swelling, fever, flank pain, genital pain, hematospermia, hesitancy, inability to urinate, nausea, urinary retention or weight loss. (Back pain  )       Objective   No Known Allergies    There is no immunization history on file for this patient.  Past Medical History:   Diagnosis Date   • Depression    • Muscle spasm       Past Surgical History:   Procedure Laterality Date   •  SECTION     • INGUINAL HERNIA REPAIR        Social History     Socioeconomic History   • Marital status:    Tobacco Use   • Smoking status: Former     Packs/day: 1.00     Years: 30.00     Pack years: 30.00     Types: Cigarettes     Start date: 1987     Quit date: 2020     Years since quittin.4   • Smokeless tobacco: Never   Vaping Use   • Vaping Use: Never used   Substance and Sexual Activity   • Alcohol use: Yes     Comment: occasionally   • Drug use: Never        Current Outpatient Medications:   •  baclofen (LIORESAL) 10 MG tablet, Take 1 tablet by mouth 3 (Three) Times a Day As Needed for Muscle Spasms., Disp: 30 tablet, Rfl: 0  •  DULoxetine (CYMBALTA) 60 MG capsule, Take 1 capsule by mouth Daily., Disp: 30 capsule, Rfl: 5  •  ibuprofen (ADVIL,MOTRIN) 800 MG tablet, Take 1 tablet by mouth Daily As Needed for Mild Pain., Disp: 30  "tablet, Rfl: 5  •  QUEtiapine XR (SEROquel XR) 150 MG 24 hr tablet, Take 1 tablet by mouth Every Night., Disp: 30 tablet, Rfl: 5  •  SUMAtriptan (Imitrex) 100 MG tablet, Take one tablet at onset of headache. May repeat dose one time in 2 hours if headache not relieved., Disp: 30 tablet, Rfl: 3  •  nitrofurantoin, macrocrystal-monohydrate, (Macrobid) 100 MG capsule, Take 1 capsule by mouth 2 (Two) Times a Day for 7 days., Disp: 14 capsule, Rfl: 0   Family History   Problem Relation Age of Onset   • Arthritis Other    • COPD Other    • Diabetes type II Other           Vital Signs:   Vitals:    04/27/23 1353   BP: 138/82   Pulse: 55   Temp: 97.5 °F (36.4 °C)   SpO2: 99%   Weight: 71 kg (156 lb 9.6 oz)   Height: 166.4 cm (65.5\")       Review of Systems   Constitutional: Negative for chills, fever and weight loss.   HENT: Negative for facial swelling.    Gastrointestinal: Negative for abdominal pain and nausea.   Genitourinary: Positive for hematuria. Negative for dysuria, flank pain, frequency, hesitancy, incomplete emptying, nocturia and urgency.      Physical Exam  Vitals reviewed.   Constitutional:       Appearance: Normal appearance. She is well-developed.   HENT:      Head: Normocephalic and atraumatic.      Right Ear: External ear normal.      Left Ear: External ear normal.      Mouth/Throat:      Pharynx: No oropharyngeal exudate.   Eyes:      Conjunctiva/sclera: Conjunctivae normal.      Pupils: Pupils are equal, round, and reactive to light.   Cardiovascular:      Rate and Rhythm: Normal rate and regular rhythm.      Pulses: Normal pulses.      Heart sounds: Normal heart sounds. No murmur heard.    No friction rub. No gallop.   Pulmonary:      Effort: Pulmonary effort is normal.      Breath sounds: Normal breath sounds. No wheezing or rhonchi.   Abdominal:      General: Abdomen is flat. Bowel sounds are normal. There is no distension.      Palpations: Abdomen is soft. There is no mass.      Tenderness: There is " no abdominal tenderness. There is no right CVA tenderness, left CVA tenderness, guarding or rebound.      Hernia: No hernia is present.   Musculoskeletal:         General: Normal range of motion.   Skin:     General: Skin is warm and dry.      Capillary Refill: Capillary refill takes less than 2 seconds.   Neurological:      General: No focal deficit present.      Mental Status: She is alert and oriented to person, place, and time.      Cranial Nerves: No cranial nerve deficit.   Psychiatric:         Mood and Affect: Mood and affect normal.         Behavior: Behavior normal.         Thought Content: Thought content normal.         Judgment: Judgment normal.        Result Review :                 Assessment and Plan    Diagnoses and all orders for this visit:    1. Gross hematuria (Primary)  -     POCT urinalysis dipstick, manual  -     Urinalysis With Microscopic If Indicated (No Culture) - Urine, Clean Catch; Future    2. Hematuria, unspecified type  -     Urinalysis With Microscopic If Indicated (No Culture) - Urine, Clean Catch; Future  -     Cancel: CT Abdomen Pelvis Without Contrast; Future  -     CT Abdomen Pelvis Without Contrast; Future  -     CBC Auto Differential  -     Comprehensive Metabolic Panel    3. Acute left-sided low back pain without sciatica  -     Cancel: CT Abdomen Pelvis Without Contrast; Future  -     CT Abdomen Pelvis Without Contrast; Future    4. Acute cystitis with hematuria  -     nitrofurantoin, macrocrystal-monohydrate, (Macrobid) 100 MG capsule; Take 1 capsule by mouth 2 (Two) Times a Day for 7 days.  Dispense: 14 capsule; Refill: 0  -     Urine Culture - Urine, Urine, Clean Catch            Follow Up   Return if symptoms worsen or fail to improve.  Patient was given instructions and counseling regarding her condition or for health maintenance advice. Please see specific information pulled into the AVS if appropriate.

## 2023-04-27 NOTE — PROGRESS NOTES
Venipuncture Blood Specimen Collection  Venipuncture performed in left arm by Candida Ferrell with good hemostasis. Patient tolerated the procedure well without complications.   04/27/23   Candida Ferrell

## 2023-04-28 DIAGNOSIS — N20.0 KIDNEY STONES: Primary | ICD-10-CM

## 2023-04-28 DIAGNOSIS — R73.09 ELEVATED RANDOM BLOOD GLUCOSE LEVEL: Primary | ICD-10-CM

## 2023-04-28 DIAGNOSIS — R31.9 HEMATURIA, UNSPECIFIED TYPE: ICD-10-CM

## 2023-04-28 LAB — BACTERIA SPEC AEROBE CULT: NO GROWTH

## 2023-04-28 NOTE — PROGRESS NOTES
Call pt:  CT abdomen/pelvis showed bilateral non-obstructing kidney stones that he should pass.  For this I have referred him to urology increase he is not passing stones.  Have pt go to ER if pain worsens over the weekend.  Follow-up in office if symptoms change or do not improve.  See urology if symptoms not resolving.

## 2023-04-28 NOTE — PROGRESS NOTES
Labs show elevated blood glucose.  I ordered a HGM A1C to be done to check this out.  Come in anytime to get this done.  You do not need to be fasting.

## 2023-05-01 DIAGNOSIS — N20.0 KIDNEY STONES: Primary | ICD-10-CM

## 2023-05-01 RX ORDER — TAMSULOSIN HYDROCHLORIDE 0.4 MG/1
1 CAPSULE ORAL DAILY
Qty: 14 CAPSULE | Refills: 0 | Status: SHIPPED | OUTPATIENT
Start: 2023-05-01 | End: 2023-05-15

## 2023-05-04 ENCOUNTER — TELEPHONE (OUTPATIENT)
Dept: UROLOGY | Facility: CLINIC | Age: 53
End: 2023-05-04
Payer: COMMERCIAL

## 2023-05-04 NOTE — TELEPHONE ENCOUNTER
UNABLE TO REACH .    Caller: ESTEFANIA DOE     Relationship: SELF      Best call back number: [phone]    PT RETURNING CALL TO GORDY.

## 2023-05-22 ENCOUNTER — CLINICAL SUPPORT (OUTPATIENT)
Dept: FAMILY MEDICINE CLINIC | Facility: CLINIC | Age: 53
End: 2023-05-22
Payer: COMMERCIAL

## 2023-05-22 ENCOUNTER — TELEPHONE (OUTPATIENT)
Dept: FAMILY MEDICINE CLINIC | Facility: CLINIC | Age: 53
End: 2023-05-22

## 2023-05-22 DIAGNOSIS — N20.0 KIDNEY STONES: Primary | ICD-10-CM

## 2023-05-22 DIAGNOSIS — G43.909 MIGRAINE WITHOUT STATUS MIGRAINOSUS, NOT INTRACTABLE, UNSPECIFIED MIGRAINE TYPE: ICD-10-CM

## 2023-05-22 RX ORDER — KETOROLAC TROMETHAMINE 30 MG/ML
30 INJECTION, SOLUTION INTRAMUSCULAR; INTRAVENOUS EVERY 6 HOURS PRN
Status: SHIPPED | OUTPATIENT
Start: 2023-05-22 | End: 2023-05-27

## 2023-05-22 RX ORDER — SUMATRIPTAN 100 MG/1
TABLET, FILM COATED ORAL
Qty: 30 TABLET | Refills: 3 | Status: SHIPPED | OUTPATIENT
Start: 2023-05-22

## 2023-05-22 RX ADMIN — KETOROLAC TROMETHAMINE 30 MG: 30 INJECTION, SOLUTION INTRAMUSCULAR; INTRAVENOUS at 16:44

## 2023-05-22 RX ADMIN — KETOROLAC TROMETHAMINE 30 MG: 30 INJECTION, SOLUTION INTRAMUSCULAR; INTRAVENOUS at 16:42

## 2023-05-22 NOTE — PROGRESS NOTES
Patient has kidney stone and can not get into her speicalist til Thursday so per Dr. Marcial give her 60mg Ketorolac.

## 2023-05-22 NOTE — TELEPHONE ENCOUNTER
Caller: Rosie Medrano    Relationship: Self    Best call back number: [phone]     What medication are you requesting: SOMETHING TO HELP WITH PAIN    What are your current symptoms: KIDNEY STONE- SHARP PAIN ON RIGHT HAND SIDE- BLOOD IN URINE    How long have you been experiencing symptoms: 5.22.2023    Have you had these symptoms before:    [x] Yes  [] No    Have you been treated for these symptoms before:   [x] Yes  [] No    If a prescription is needed, what is your preferred pharmacy and phone number:  [address] [phone] [facility] [phone]      Additional notes: PATIENT IS SCHEDULED WITH UROLOGY ON Thursday 5.25.2023, HOWEVER PATIENT STATES SHE GOT UP THIS MORNING WITH SEVERE PAIN, PATIENT STATES SHE VOMITED AND HAS BLOOD IN HER URINE AS WELL.

## 2023-05-25 ENCOUNTER — OFFICE VISIT (OUTPATIENT)
Dept: UROLOGY | Facility: CLINIC | Age: 53
End: 2023-05-25
Payer: COMMERCIAL

## 2023-05-25 ENCOUNTER — PREP FOR SURGERY (OUTPATIENT)
Dept: OTHER | Facility: HOSPITAL | Age: 53
End: 2023-05-25
Payer: COMMERCIAL

## 2023-05-25 VITALS — HEIGHT: 66 IN | BODY MASS INDEX: 25.78 KG/M2 | WEIGHT: 160.4 LBS | RESPIRATION RATE: 14 BRPM

## 2023-05-25 DIAGNOSIS — N20.0 NEPHROLITHIASIS: Primary | ICD-10-CM

## 2023-05-25 DIAGNOSIS — R31.0 GROSS HEMATURIA: ICD-10-CM

## 2023-05-25 DIAGNOSIS — N20.1 RIGHT URETERAL STONE: Primary | ICD-10-CM

## 2023-05-25 RX ORDER — HYDROCODONE BITARTRATE AND ACETAMINOPHEN 7.5; 325 MG/1; MG/1
1 TABLET ORAL EVERY 6 HOURS PRN
Qty: 8 TABLET | Refills: 0 | Status: SHIPPED | OUTPATIENT
Start: 2023-05-25

## 2023-05-25 RX ORDER — KETOROLAC TROMETHAMINE 10 MG/1
10 TABLET, FILM COATED ORAL EVERY 6 HOURS PRN
Qty: 8 TABLET | Refills: 0 | Status: SHIPPED | OUTPATIENT
Start: 2023-05-25

## 2023-05-25 RX ORDER — LEVOFLOXACIN 5 MG/ML
500 INJECTION, SOLUTION INTRAVENOUS ONCE
OUTPATIENT
Start: 2023-05-25 | End: 2023-05-25

## 2023-05-25 RX ORDER — TAMSULOSIN HYDROCHLORIDE 0.4 MG/1
1 CAPSULE ORAL DAILY
Qty: 14 CAPSULE | Refills: 0 | Status: SHIPPED | OUTPATIENT
Start: 2023-05-25

## 2023-05-25 NOTE — PROGRESS NOTES
UROLOGY OFFICE H&P NOTE    Subjective   HPI  Rosie Medrano is a 52 y.o. female.  Presents for evaluation of nephrolithiasis; CT scan obtained by PCP after episode of gross hematuria.    The patient's symptoms initially onset with hematuria and no pain. She did not pass any clots. Her urine was brown for 1 week. She woke up on 2023 with diffuse pain and she had a recurrence of hematuria. She received a Toradol injection for her pain, which provided relief. She was fine on 2023. She woke up on 2023 with severe right-sided pain, but no hematuria.   She denies dysuria.   She maintains adequate hydration.     She was prescribed tamsulosin, but she has run out. She has tried ibuprofen for pain, which has not provided relief.    She smokes less than 1 pack of cigarettes daily. She quit for some time, but she restarted this.     The patient's mother has non-cancerous kidney disease.  Denies family history of genitourinary malignancy.      ________________  Labs 2023  Creatinine 1.06   Urine culture no growth  Urine dip; red; large blood    CT abdomen pelvis without contrast 2023: 2 small nonobstructing stones versus renal calcifications at the left   renal sinus.  There are 2 abutting 3 mm stones in the inferior right kidney.  There is no hydronephrosis.  There is no ureteral stone.  Urinary bladder is nondistended        Medical History:  Past Medical History:   Diagnosis Date   • Depression    • Muscle spasm         Social History:  Social History     Socioeconomic History   • Marital status:    Tobacco Use   • Smoking status: Former     Packs/day: 1.00     Years: 30.00     Pack years: 30.00     Types: Cigarettes     Start date: 1987     Quit date: 2020     Years since quittin.5   • Smokeless tobacco: Never   Vaping Use   • Vaping Use: Never used   Substance and Sexual Activity   • Alcohol use: Yes     Comment: occasionally   • Drug use: Never        Family  "History:  Family History   Problem Relation Age of Onset   • Arthritis Other    • COPD Other    • Diabetes type II Other         Surgical History:  Past Surgical History:   Procedure Laterality Date   •  SECTION     • INGUINAL HERNIA REPAIR          Allergies:  No Known Allergies     Current Medications:  Current Outpatient Medications   Medication Sig Dispense Refill   • baclofen (LIORESAL) 10 MG tablet Take 1 tablet by mouth 3 (Three) Times a Day As Needed for Muscle Spasms. 30 tablet 0   • DULoxetine (CYMBALTA) 60 MG capsule Take 1 capsule by mouth Daily. 30 capsule 5   • ibuprofen (ADVIL,MOTRIN) 800 MG tablet Take 1 tablet by mouth Daily As Needed for Mild Pain. 30 tablet 5   • QUEtiapine XR (SEROquel XR) 150 MG 24 hr tablet Take 1 tablet by mouth Every Night. 30 tablet 5   • SUMAtriptan (IMITREX) 100 MG tablet TAKE 1 TABLET BY MOUTH AT ONSET OF HEADACHE. IF NO RESPONSE IN 2 HOURS MAY REPEAT DOSE FOR 1 DOSE. MAX 2/24 HRS 30 tablet 3   • HYDROcodone-acetaminophen (Norco) 7.5-325 MG per tablet Take 1 tablet by mouth Every 6 (Six) Hours As Needed for Severe Pain. 8 tablet 0   • ketorolac (TORADOL) 10 MG tablet Take 1 tablet by mouth Every 6 (Six) Hours As Needed for Moderate Pain. 8 tablet 0   • tamsulosin (FLOMAX) 0.4 MG capsule 24 hr capsule Take 1 capsule by mouth Daily. 14 capsule 0     No current facility-administered medications for this visit.       Review of systems  A review of systems was performed, and positive findings are noted in the HPI.    Objective     Vital Signs:   Resp 14   Ht 166.4 cm (65.51\")   Wt 72.8 kg (160 lb 6.4 oz)   BMI 26.28 kg/m²       Physical exam  No acute distress, well-nourished  Lungs: Clear, unlabored  CV: Regular rate, no chest retractions  Awake alert and oriented  Mood normal; affect normal    Results  PROCEDURE:  CT ABDOMEN PELVIS WO CONTRAST     COMPARISON: None     INDICATIONS:  GROSS HEMATURIA X 1 DAY.     TECHNIQUE:    CT images were created without " intravenous contrast.       PROTOCOL:     Standard imaging protocol performed                 RADIATION:      DLP: 485.1mGy*cm               Automated exposure control was utilized to minimize radiation dose.      FINDINGS:          There is a 3 mm semi solid nodular density in the right middle lobe on image 3. There is a 3 mm   subpleural left lower lobe nodule on axial image 9. There is a 2 mm subpleural left lower lobe   nodule on axial image 10. There is a 4 mm right basilar nodule posteriorly on axial image 30.   Distal esophagus is unremarkable.  The heart size is normal.  Superficial soft tissues appear   within normal limits.  There are no acute osseous abnormalities or destructive bone lesions.  There   are mild multilevel thoracolumbar degenerative changes.  There is mild S-shaped curvature of the   spine.     The liver, gallbladder, bile ducts, pancreas, stomach, spleen and adrenal glands appear within   normal limits.  There are 2 small nonobstructing stones versus renal calcifications at the left   renal sinus.  There are 2 abutting 3 mm stones in the inferior right kidney.  There is no   hydronephrosis.  There is no ureteral stone.  Urinary bladder is nondistended.  The uterus and   adnexal structures appear within normal limits.  The appendix is normal.  There is colonic   diverticulosis.  No small bowel distention.  There are tubal ligation clips in the upper anterior   abdomen.  No ascites, pneumoperitoneum or lymphadenopathy.  The abdominal aorta is normal diameter.     IMPRESSION:                 1. Small nonobstructing bilateral kidney stones.  No ureteral stone or hydronephrosis.  2. Colonic diverticulosis.            DANIEL LUGO MD         Electronically Signed and Approved By: DANIEL LUGO MD on 4/27/2023 at 18:07            Problem List:  Patient Active Problem List   Diagnosis   • Depression   • Major depressive disorder, single episode, unspecified   • Arthritis   • Primary  hypertension   • Migraine without status migrainosus, not intractable   • Right ureteral stone   • Gross hematuria       Assessment & Plan   Diagnoses and all orders for this visit:    1. Nephrolithiasis (Primary)  -     tamsulosin (FLOMAX) 0.4 MG capsule 24 hr capsule; Take 1 capsule by mouth Daily.  Dispense: 14 capsule; Refill: 0  -     HYDROcodone-acetaminophen (Norco) 7.5-325 MG per tablet; Take 1 tablet by mouth Every 6 (Six) Hours As Needed for Severe Pain.  Dispense: 8 tablet; Refill: 0  -     ketorolac (TORADOL) 10 MG tablet; Take 1 tablet by mouth Every 6 (Six) Hours As Needed for Moderate Pain.  Dispense: 8 tablet; Refill: 0    2. Gross hematuria      Bilateral nephrolithiasis including a right stone within the renal pelvis as well as other intrarenal stones, total stone burden on the right approximately 6 mm.  Left intrarenal nephrolithiasis, tiny, no indication for intervention.      CT imaging personally reviewed by me, demonstrated and discussed with patient at length.    Believe the right renal pelvis stone to be the etiology for patient's gross hematuria particularly given episode of acute pain associated with her hematuria.  However, discussed at length should she experience hematuria without identifiable etiology would warrant lower urinary tract evaluation via cystoscopy particularly given her high risk status for tobacco use.  Aware that if definitive stone management is performed, cystoscopy will be performed at that time.  Patient notes understanding of this.    Unknown certainty of whether patient will pass the right renal pelvis given the proximal location.  Patient motivated to attempt to pass.  Provided medical expulsive therapy via Flomax and p.o. symptom control.  Prescription sent to pharmacy, side effects discussed.    Counseled should she experience acute right flank pain with uncontrolled pain, persistent emesis, or associated fever over 101, she should call urology or go to the ER  immediately.    Recommend plan to proceed with right-sided definitive surgical management of stone if unable to successfully pass the stones.  Plan for cystoscopy, bilateral retrograde pyelogram, right ureteroscopy, laser lithotripsy, stent placement.    Risks, benefits and alternatives were discussed with patient including bleeding, infection, damage to surrounding structures, stone recurrence, stricture.  Patient also notes understanding that if unable to reach the level of the stone or if significant purulent discharge noted upon initiation of procedure that stent will be placed in definitive stone management will be deferred until the collecting system is optimized.  Patient is understanding the above risks and is agreeable to proceed.      She is to notify us should she successfully passed the stones    Schedule OR  All question addressed at this time.          Transcribed from ambient dictation for Samra Mac MD by Renee George.  05/25/23   19:16 EDT    Patient or patient representative verbalized consent to the visit recording.  I have personally performed the services described in this document as transcribed by the above individual, and it is both accurate and complete.

## 2023-08-01 DIAGNOSIS — M19.90 ARTHRITIS: ICD-10-CM

## 2023-08-01 DIAGNOSIS — F32.9 MAJOR DEPRESSIVE DISORDER, SINGLE EPISODE, UNSPECIFIED: ICD-10-CM

## 2023-08-04 RX ORDER — DULOXETIN HYDROCHLORIDE 60 MG/1
CAPSULE, DELAYED RELEASE ORAL
Qty: 30 CAPSULE | Refills: 5 | Status: SHIPPED | OUTPATIENT
Start: 2023-08-04

## 2023-08-04 RX ORDER — IBUPROFEN 800 MG/1
800 TABLET ORAL DAILY PRN
Qty: 30 TABLET | Refills: 5 | Status: SHIPPED | OUTPATIENT
Start: 2023-08-04

## 2023-08-04 RX ORDER — QUETIAPINE 150 MG/1
150 TABLET, FILM COATED, EXTENDED RELEASE ORAL NIGHTLY
Qty: 30 TABLET | Refills: 5 | Status: SHIPPED | OUTPATIENT
Start: 2023-08-04

## 2023-08-10 ENCOUNTER — HOSPITAL ENCOUNTER (OUTPATIENT)
Dept: CT IMAGING | Facility: HOSPITAL | Age: 53
Discharge: HOME | End: 2023-08-10
Admitting: UROLOGY
Payer: COMMERCIAL

## 2023-08-10 DIAGNOSIS — R10.9 FLANK PAIN: ICD-10-CM

## 2023-08-10 DIAGNOSIS — N20.0 NEPHROLITHIASIS: ICD-10-CM

## 2023-08-10 PROCEDURE — 74176 CT ABD & PELVIS W/O CONTRAST: CPT

## 2023-08-15 ENCOUNTER — TELEPHONE (OUTPATIENT)
Dept: UROLOGY | Facility: CLINIC | Age: 53
End: 2023-08-15
Payer: COMMERCIAL

## 2023-08-15 ENCOUNTER — PREP FOR SURGERY (OUTPATIENT)
Dept: OTHER | Facility: HOSPITAL | Age: 53
End: 2023-08-15
Payer: COMMERCIAL

## 2023-08-15 DIAGNOSIS — N20.1 RIGHT URETERAL STONE: Primary | ICD-10-CM

## 2023-08-15 RX ORDER — TAMSULOSIN HYDROCHLORIDE 0.4 MG/1
1 CAPSULE ORAL DAILY
Qty: 14 CAPSULE | Refills: 0 | Status: ON HOLD | OUTPATIENT
Start: 2023-08-15 | End: 2023-08-18 | Stop reason: SDUPTHER

## 2023-08-15 RX ORDER — HYDROCODONE BITARTRATE AND ACETAMINOPHEN 7.5; 325 MG/1; MG/1
1 TABLET ORAL EVERY 6 HOURS PRN
Qty: 12 TABLET | Refills: 0 | Status: SHIPPED | OUTPATIENT
Start: 2023-08-15 | End: 2023-08-18 | Stop reason: HOSPADM

## 2023-08-15 NOTE — TELEPHONE ENCOUNTER
----- Message from Samra Mac MD sent at 8/15/2023  2:30 PM EDT -----  Please call to triage patient.  Patient had right ureteral stone.  Okay for trial of passage depending on symptoms versus getting set up for OR.  Thank you

## 2023-08-15 NOTE — TELEPHONE ENCOUNTER
TRIAGED PT. NO FEVER, CHILLS, VOMITING. IS ABLE TO URINATE NORMALLY. LRQ PAIN 7/10. IS WILLING TO HAVE SURGERY IF WE CAN DO IT SOON. PT IS REQ PAIN MEDS ALSO. RELAYED MESSAGE TO DR MINOR. ADVISED PT THAT I WILL CALL HER BACK AS SOON AS I HAVE AN ANSWER FOR HER. PT EXPRESSED UNDERSTANDING AND IS AGREEABLE.

## 2023-08-16 NOTE — PRE-PROCEDURE INSTRUCTIONS
"IMPORTANT INSTRUCTIONS - PRE-ADMISSION TESTING  DO NOT EAT OR CHEW anything after midnight the night before your procedure.    You may have CLEAR liquids up to 2 hours prior to ARRIVAL time.   Take the following medications the morning of your procedure with JUST A SIP OF WATER: CYMBALTA, HYDROCODONE  IF NEEDED, TAMSULOSIN    DO NOT BRING your medications to the hospital with you, UNLESS something has changed since your PRE-Admission Testing appointment.  Hold all vitamins, supplements, and NSAIDS (Non- steroidal anti-inflammatory meds) for one week prior to surgery (you MAY take Tylenol or Acetaminophen).  If you are diabetic, check your blood sugar the morning of your procedure. If it is less than 70 or if you are feeling symptomatic, call the following number for further instructions: [phone]  Use your inhalers/nebulizers as usual, the morning of your procedure. BRING YOUR INHALERS with you.   Bring your CPAP or BIPAP to hospital, ONLY IF YOU WILL BE SPENDING THE NIGHT.   Make sure you have a ride home and have someone who will stay with you the day of your procedure after you go home.  If you have any questions, please call your Pre-Admission Testing NursePAOLA at [phone].   Per anesthesia request, do not smoke for 24 hours before your procedure or as instructed by your surgeon.  Clear Liquid Diet        Find out when you need to start a clear liquid diet.   Think of "clear liquids" as anything you could read a newspaper through. This includes things like water, broth, sports drinks, or tea WITHOUT any kind of milk or cream.           Once you are told to start a clear liquid diet, only drink these things until 2 hours before arrival to the hospital or when the hospital says to stop. Total volume limitation: 8 oz.       Clear liquids you CAN drink:   Water   Clear broth: beef, chicken, vegetable, or bone broth with nothing in it   Gatorade   Lemonade or Oleg-aid   Soda   Tea, coffee (NO cream " or honey)   Jell-O (without fruit)   Popsicles (without fruit or cream)   Italian ices   Juice without pulp: apple, white, grape   You may use salt, pepper, and sugar    Do NOT drink:   Milk or cream   Soy milk, almond milk, coconut milk, or other non-dairy drinks and   creamers   Milkshakes or smoothies   Tomato juice   Orange juice   Grapefruit juice   Cream soups or any other than broth         Clear Liquid Diet:  Do NOT eat any solid food.  Do NOT eat or suck on mints or candy.  Do NOT chew gum.  Do NOT drink thick liquids like milk or juice with pulp in it.  Do NOT add milk, cream, or anything like soy milk or almond milk to coffee or tea.

## 2023-08-18 ENCOUNTER — HOSPITAL ENCOUNTER (OUTPATIENT)
Facility: HOSPITAL | Age: 53
Setting detail: HOSPITAL OUTPATIENT SURGERY
Discharge: HOME | End: 2023-08-18
Attending: UROLOGY | Admitting: UROLOGY
Payer: COMMERCIAL

## 2023-08-18 ENCOUNTER — APPOINTMENT (OUTPATIENT)
Dept: GENERAL RADIOLOGY | Facility: HOSPITAL | Age: 53
End: 2023-08-18
Payer: COMMERCIAL

## 2023-08-18 ENCOUNTER — ANESTHESIA EVENT (OUTPATIENT)
Dept: PERIOP | Facility: HOSPITAL | Age: 53
End: 2023-08-18
Payer: COMMERCIAL

## 2023-08-18 ENCOUNTER — ANESTHESIA (OUTPATIENT)
Dept: PERIOP | Facility: HOSPITAL | Age: 53
End: 2023-08-18
Payer: COMMERCIAL

## 2023-08-18 VITALS
SYSTOLIC BLOOD PRESSURE: 180 MMHG | DIASTOLIC BLOOD PRESSURE: 88 MMHG | OXYGEN SATURATION: 98 % | HEART RATE: 65 BPM | HEIGHT: 66 IN | RESPIRATION RATE: 18 BRPM | TEMPERATURE: 98.3 F | WEIGHT: 154.98 LBS | BODY MASS INDEX: 24.91 KG/M2

## 2023-08-18 DIAGNOSIS — R31.0 GROSS HEMATURIA: ICD-10-CM

## 2023-08-18 DIAGNOSIS — N20.1 RIGHT URETERAL STONE: ICD-10-CM

## 2023-08-18 PROCEDURE — C1758 CATHETER, URETERAL: HCPCS | Performed by: UROLOGY

## 2023-08-18 PROCEDURE — 25010000002 LEVOFLOXACIN PER 250 MG: Performed by: UROLOGY

## 2023-08-18 PROCEDURE — 25010000002 HYDROMORPHONE 1 MG/ML SOLUTION

## 2023-08-18 PROCEDURE — C1769 GUIDE WIRE: HCPCS | Performed by: UROLOGY

## 2023-08-18 PROCEDURE — C2617 STENT, NON-COR, TEM W/O DEL: HCPCS | Performed by: UROLOGY

## 2023-08-18 PROCEDURE — S0260 H&P FOR SURGERY: HCPCS | Performed by: UROLOGY

## 2023-08-18 PROCEDURE — 82365 CALCULUS SPECTROSCOPY: CPT | Performed by: UROLOGY

## 2023-08-18 PROCEDURE — 74420 UROGRAPHY RTRGR +-KUB: CPT | Performed by: UROLOGY

## 2023-08-18 PROCEDURE — 25010000002 MIDAZOLAM PER 1MG: Performed by: ANESTHESIOLOGY

## 2023-08-18 PROCEDURE — 88300 SURGICAL PATH GROSS: CPT | Performed by: UROLOGY

## 2023-08-18 PROCEDURE — 25010000002 PROPOFOL 10 MG/ML EMULSION

## 2023-08-18 PROCEDURE — 25510000001 IOPAMIDOL PER 1 ML: Performed by: UROLOGY

## 2023-08-18 PROCEDURE — 52356 CYSTO/URETERO W/LITHOTRIPSY: CPT | Performed by: UROLOGY

## 2023-08-18 PROCEDURE — 25010000002 KETOROLAC TROMETHAMINE PER 15 MG: Performed by: UROLOGY

## 2023-08-18 PROCEDURE — 76000 FLUOROSCOPY <1 HR PHYS/QHP: CPT

## 2023-08-18 PROCEDURE — 25010000002 ONDANSETRON PER 1 MG

## 2023-08-18 PROCEDURE — 25010000002 FENTANYL CITRATE (PF) 50 MCG/ML SOLUTION

## 2023-08-18 PROCEDURE — 25010000002 DEXAMETHASONE PER 1 MG

## 2023-08-18 PROCEDURE — C1894 INTRO/SHEATH, NON-LASER: HCPCS | Performed by: UROLOGY

## 2023-08-18 PROCEDURE — 25010000002 SUGAMMADEX 200 MG/2ML SOLUTION

## 2023-08-18 DEVICE — URETERAL STENT
Type: IMPLANTABLE DEVICE | Site: URETER | Status: FUNCTIONAL
Brand: ASCERTA™

## 2023-08-18 RX ORDER — LEVOFLOXACIN 5 MG/ML
500 INJECTION, SOLUTION INTRAVENOUS ONCE
Status: COMPLETED | OUTPATIENT
Start: 2023-08-18 | End: 2023-08-18

## 2023-08-18 RX ORDER — PROMETHAZINE HYDROCHLORIDE 25 MG/1
25 SUPPOSITORY RECTAL ONCE AS NEEDED
Status: DISCONTINUED | OUTPATIENT
Start: 2023-08-18 | End: 2023-08-18 | Stop reason: HOSPADM

## 2023-08-18 RX ORDER — ONDANSETRON 4 MG/1
4 TABLET, FILM COATED ORAL ONCE AS NEEDED
Status: DISCONTINUED | OUTPATIENT
Start: 2023-08-18 | End: 2023-08-18 | Stop reason: HOSPADM

## 2023-08-18 RX ORDER — PHENYLEPHRINE HCL IN 0.9% NACL 1 MG/10 ML
SYRINGE (ML) INTRAVENOUS AS NEEDED
Status: DISCONTINUED | OUTPATIENT
Start: 2023-08-18 | End: 2023-08-18 | Stop reason: SURG

## 2023-08-18 RX ORDER — ACETAMINOPHEN 500 MG
1000 TABLET ORAL ONCE
Status: COMPLETED | OUTPATIENT
Start: 2023-08-18 | End: 2023-08-18

## 2023-08-18 RX ORDER — MAGNESIUM HYDROXIDE 1200 MG/15ML
LIQUID ORAL AS NEEDED
Status: DISCONTINUED | OUTPATIENT
Start: 2023-08-18 | End: 2023-08-18 | Stop reason: HOSPADM

## 2023-08-18 RX ORDER — ONDANSETRON 2 MG/ML
4 INJECTION INTRAMUSCULAR; INTRAVENOUS ONCE AS NEEDED
Status: DISCONTINUED | OUTPATIENT
Start: 2023-08-18 | End: 2023-08-18 | Stop reason: HOSPADM

## 2023-08-18 RX ORDER — OXYBUTYNIN CHLORIDE 5 MG/1
5 TABLET ORAL 3 TIMES DAILY PRN
Qty: 12 TABLET | Refills: 0 | Status: SHIPPED | OUTPATIENT
Start: 2023-08-18

## 2023-08-18 RX ORDER — SODIUM CHLORIDE 9 MG/ML
40 INJECTION, SOLUTION INTRAVENOUS AS NEEDED
Status: DISCONTINUED | OUTPATIENT
Start: 2023-08-18 | End: 2023-08-18 | Stop reason: HOSPADM

## 2023-08-18 RX ORDER — ROCURONIUM BROMIDE 10 MG/ML
INJECTION, SOLUTION INTRAVENOUS AS NEEDED
Status: DISCONTINUED | OUTPATIENT
Start: 2023-08-18 | End: 2023-08-18 | Stop reason: SURG

## 2023-08-18 RX ORDER — PROPOFOL 10 MG/ML
VIAL (ML) INTRAVENOUS AS NEEDED
Status: DISCONTINUED | OUTPATIENT
Start: 2023-08-18 | End: 2023-08-18 | Stop reason: SURG

## 2023-08-18 RX ORDER — OXYCODONE HYDROCHLORIDE AND ACETAMINOPHEN 5; 325 MG/1; MG/1
1-2 TABLET ORAL EVERY 6 HOURS PRN
Qty: 14 TABLET | Refills: 0 | Status: SHIPPED | OUTPATIENT
Start: 2023-08-18 | End: 2023-08-25 | Stop reason: SDUPTHER

## 2023-08-18 RX ORDER — KETOROLAC TROMETHAMINE 30 MG/ML
15 INJECTION, SOLUTION INTRAMUSCULAR; INTRAVENOUS ONCE
Status: COMPLETED | OUTPATIENT
Start: 2023-08-18 | End: 2023-08-18

## 2023-08-18 RX ORDER — ONDANSETRON 2 MG/ML
INJECTION INTRAMUSCULAR; INTRAVENOUS AS NEEDED
Status: DISCONTINUED | OUTPATIENT
Start: 2023-08-18 | End: 2023-08-18 | Stop reason: SURG

## 2023-08-18 RX ORDER — IBUPROFEN 600 MG/1
600 TABLET ORAL EVERY 6 HOURS PRN
Status: DISCONTINUED | OUTPATIENT
Start: 2023-08-18 | End: 2023-08-18 | Stop reason: HOSPADM

## 2023-08-18 RX ORDER — MIDAZOLAM HYDROCHLORIDE 2 MG/2ML
2 INJECTION, SOLUTION INTRAMUSCULAR; INTRAVENOUS ONCE
Status: COMPLETED | OUTPATIENT
Start: 2023-08-18 | End: 2023-08-18

## 2023-08-18 RX ORDER — KETOROLAC TROMETHAMINE 10 MG/1
10 TABLET, FILM COATED ORAL EVERY 6 HOURS PRN
Qty: 8 TABLET | Refills: 0 | Status: SHIPPED | OUTPATIENT
Start: 2023-08-18

## 2023-08-18 RX ORDER — OXYCODONE HCL 5 MG/5 ML
5 SOLUTION, ORAL ORAL EVERY 4 HOURS PRN
Status: DISCONTINUED | OUTPATIENT
Start: 2023-08-18 | End: 2023-08-18 | Stop reason: HOSPADM

## 2023-08-18 RX ORDER — LIDOCAINE HYDROCHLORIDE 20 MG/ML
INJECTION, SOLUTION EPIDURAL; INFILTRATION; INTRACAUDAL; PERINEURAL AS NEEDED
Status: DISCONTINUED | OUTPATIENT
Start: 2023-08-18 | End: 2023-08-18 | Stop reason: SURG

## 2023-08-18 RX ORDER — DEXAMETHASONE SODIUM PHOSPHATE 4 MG/ML
INJECTION, SOLUTION INTRA-ARTICULAR; INTRALESIONAL; INTRAMUSCULAR; INTRAVENOUS; SOFT TISSUE AS NEEDED
Status: DISCONTINUED | OUTPATIENT
Start: 2023-08-18 | End: 2023-08-18 | Stop reason: SURG

## 2023-08-18 RX ORDER — ACETAMINOPHEN 325 MG/1
650 TABLET ORAL ONCE
Status: DISCONTINUED | OUTPATIENT
Start: 2023-08-18 | End: 2023-08-18

## 2023-08-18 RX ORDER — PROMETHAZINE HYDROCHLORIDE 12.5 MG/1
25 TABLET ORAL ONCE AS NEEDED
Status: DISCONTINUED | OUTPATIENT
Start: 2023-08-18 | End: 2023-08-18 | Stop reason: HOSPADM

## 2023-08-18 RX ORDER — SODIUM CHLORIDE, SODIUM LACTATE, POTASSIUM CHLORIDE, CALCIUM CHLORIDE 600; 310; 30; 20 MG/100ML; MG/100ML; MG/100ML; MG/100ML
9 INJECTION, SOLUTION INTRAVENOUS CONTINUOUS PRN
Status: DISCONTINUED | OUTPATIENT
Start: 2023-08-18 | End: 2023-08-18 | Stop reason: HOSPADM

## 2023-08-18 RX ORDER — PROMETHAZINE HYDROCHLORIDE 12.5 MG/1
12.5 TABLET ORAL ONCE AS NEEDED
Status: DISCONTINUED | OUTPATIENT
Start: 2023-08-18 | End: 2023-08-18 | Stop reason: HOSPADM

## 2023-08-18 RX ORDER — TAMSULOSIN HYDROCHLORIDE 0.4 MG/1
1 CAPSULE ORAL DAILY
Qty: 14 CAPSULE | Refills: 0 | Status: SHIPPED | OUTPATIENT
Start: 2023-08-18

## 2023-08-18 RX ORDER — FENTANYL CITRATE 50 UG/ML
INJECTION, SOLUTION INTRAMUSCULAR; INTRAVENOUS AS NEEDED
Status: DISCONTINUED | OUTPATIENT
Start: 2023-08-18 | End: 2023-08-18 | Stop reason: SURG

## 2023-08-18 RX ORDER — MEPERIDINE HYDROCHLORIDE 25 MG/ML
12.5 INJECTION INTRAMUSCULAR; INTRAVENOUS; SUBCUTANEOUS
Status: DISCONTINUED | OUTPATIENT
Start: 2023-08-18 | End: 2023-08-18 | Stop reason: HOSPADM

## 2023-08-18 RX ORDER — PHENAZOPYRIDINE HYDROCHLORIDE 200 MG/1
200 TABLET, FILM COATED ORAL 3 TIMES DAILY PRN
Qty: 20 TABLET | Refills: 0 | Status: SHIPPED | OUTPATIENT
Start: 2023-08-18

## 2023-08-18 RX ADMIN — SODIUM CHLORIDE, POTASSIUM CHLORIDE, SODIUM LACTATE AND CALCIUM CHLORIDE 9 ML/HR: 600; 310; 30; 20 INJECTION, SOLUTION INTRAVENOUS at 06:44

## 2023-08-18 RX ADMIN — SUGAMMADEX 200 MG: 100 INJECTION, SOLUTION INTRAVENOUS at 08:15

## 2023-08-18 RX ADMIN — SODIUM CHLORIDE, POTASSIUM CHLORIDE, SODIUM LACTATE AND CALCIUM CHLORIDE 9 ML/HR: 600; 310; 30; 20 INJECTION, SOLUTION INTRAVENOUS at 09:00

## 2023-08-18 RX ADMIN — ACETAMINOPHEN 1000 MG: 500 TABLET ORAL at 06:44

## 2023-08-18 RX ADMIN — MIDAZOLAM HYDROCHLORIDE 2 MG: 1 INJECTION, SOLUTION INTRAMUSCULAR; INTRAVENOUS at 07:18

## 2023-08-18 RX ADMIN — KETOROLAC TROMETHAMINE 15 MG: 30 INJECTION, SOLUTION INTRAMUSCULAR; INTRAVENOUS at 08:53

## 2023-08-18 RX ADMIN — LEVOFLOXACIN 500 MG: 500 INJECTION, SOLUTION INTRAVENOUS at 07:36

## 2023-08-18 RX ADMIN — ROCURONIUM BROMIDE 50 MG: 50 INJECTION INTRAVENOUS at 07:30

## 2023-08-18 RX ADMIN — PROPOFOL 200 MG: 10 INJECTION, EMULSION INTRAVENOUS at 07:30

## 2023-08-18 RX ADMIN — OXYCODONE HYDROCHLORIDE 5 MG: 5 SOLUTION ORAL at 08:55

## 2023-08-18 RX ADMIN — HYDROMORPHONE HYDROCHLORIDE 0.5 MG: 1 INJECTION, SOLUTION INTRAMUSCULAR; INTRAVENOUS; SUBCUTANEOUS at 08:46

## 2023-08-18 RX ADMIN — Medication 100 MCG: at 07:50

## 2023-08-18 RX ADMIN — ONDANSETRON 4 MG: 2 INJECTION INTRAMUSCULAR; INTRAVENOUS at 08:07

## 2023-08-18 RX ADMIN — LIDOCAINE HYDROCHLORIDE 100 MG: 20 INJECTION, SOLUTION EPIDURAL; INFILTRATION; INTRACAUDAL; PERINEURAL at 07:30

## 2023-08-18 RX ADMIN — FENTANYL CITRATE 50 MCG: 50 INJECTION, SOLUTION INTRAMUSCULAR; INTRAVENOUS at 07:30

## 2023-08-18 RX ADMIN — FENTANYL CITRATE 25 MCG: 50 INJECTION, SOLUTION INTRAMUSCULAR; INTRAVENOUS at 07:45

## 2023-08-18 RX ADMIN — DEXAMETHASONE SODIUM PHOSPHATE 4 MG: 4 INJECTION, SOLUTION INTRAMUSCULAR; INTRAVENOUS at 07:36

## 2023-08-18 NOTE — OP NOTE
Preoperative diagnosis  Gross hematuria, right ureteral stone    Postoperative diagnosis  Gross hematuria, right ureteral stone    Procedure performed  Cystoscopy, bilateral retrograde pyelogram;  right ureteroscopy, laser lithotripsy, and ureteral stent insertion    Attending surgeon  Samra Mac MD     Anesthesia  General    EBL  0 mL    Complications  None    Specimen  Right ureteral stone    Findings  Cystoscopy without abnormality, bilateral orthotopic ureter; left retrograde pyelogram without hydronephrosis, filling defect, or stricture.  Right ureteral stone treated with laser lithotripsy, all fragments retrieved; no stone burden at conclusion of case.  Right retrograde pyelogram with moderate hydronephrosis  6 x 24 stent no string    Indications  53 y.o. female agreed to undergo the above named procedure after discussion of the alternatives, risks and benefits.   Informed consent was obtained.      Procedure  After informed consent was obtained.  The patient was taken back to the operating suite where general anesthesia was administered.  Once patient was adequately anesthetized she was placed in the dorsolithotomy position.  Her genitals were prepped and draped in the normal sterile fashion.  A rigid cystoscope was inserted gently into the urethral meatus and passed atraumatically into the bladder.  Pan cystoscopy was performed and a 360 degree manner.  No abnormalities were noted.  There were no diverticula, trabeculations, lesions, or tumors.  Patient had bilateral orthotopic ureters.  Pollick catheter was then inserted in the left ureteral orifice and retrograde pyelogram was obtained.  Retrograde pyelogram was without abnormality.  There was no hydronephrosis, filling defect, or stricture.      Focus for the remainder of the procedure was placed on the right side.  A sensor wire was used to intubate the ureteral orifice which passed up into the renal pelvis.  Next, semirigid ureteroscope was used  to navigate the distal ureter.  The distal and mid ureter was somewhat tight, navigation over a wire and fluoroscopic guidance was able to reach the pelvic brim.  At this point, the semirigid ureteroscope would not pass more proximally.  Ureteroscope was then removed leaving wire in place.  Access sheath was placed over the wire under fluoroscopic guidance.  This successfully dilated the ureter and provided access.  Next the flexible ureteroscope was inserted and passed proximally.  The proximal mid ureteral stone was encountered, noted to be somewhat tight and impacted.  Laser lithotripsy was then initiated to fragment the stone.  The stone was fragmented into several pieces which were then systematically retrieved with basket until the ureter was clear of stone.  Ureteroscope was then passed more proximally into the renal pelvis and the intrarenal collecting system.  Nephroscopy was performed.  Nephroscopy was performed in a systematic manner; there were no intrarenal stones noted.  Contrast dye was injected through the ureteroscope obtain retrograde pyelogram, revealing moderate hydronephrosis.    Finally, the access sheath and ureteroscope were retrieved inspecting the length of the ureter 1 last time which was free of stones.  Finally, a 6 x 24 stent was placed over the wire.  The wire was backloaded through the cystoscope and under direct vision the stent was placed.  Upon retrieval of the wire there was proximal coil within the UPJ and visible distal coil within the bladder.  Patient's bladder was then emptied and the procedure deemed terminated.  She was awoken from general anesthesia and transferred to the PACU in stable condition.      Signed:  Samra Mac MD  08/18/23  08:50 EDT

## 2023-08-18 NOTE — DISCHARGE INSTRUCTIONS
DISCHARGE INSTRUCTIONS  Extracorporeal Shock Wave Lithotripsy (ESWL)/ Ureteroscopy Lasertripsy      For your surgery you had:  General anesthesia (you may have a sore throat for the first 24 hours)  You may experience dizziness, drowsiness, or lightheadedness for several hours following surgery.  Do not stay alone today or tonight.  Limit your activity for 24 hours.  You should not drive or operate machinery, drink alcohol, or sign legally binding documents for 24 hours or while you are taking pain medication.  Resume your diet slowly.  Follow any special dietary instructions you may have been given by your doctor.     NOTIFY YOUR DOCTOR IF YOU EXPERIENCE ANY OF THE FOLLOWING:  Temperature greater than 101 degrees Fahrenheit  Shaking Chills  Redness or excessive drainage from incision  Nausea, vomiting and/or pain that is not controlled by prescribed medications  Increase in bleeding or bleeding that is excessive  Unable to urinate in 6 hours after surgery  If unable to reach your doctor, please go to the closest Emergency Room  Strain urine if instructed by physician.  Collect any fragments and take with you on your scheduled appointment. You may pass stone pieces or small blood clots.  Blood in your urine is normal.  It could be light pink to cherry color.  Drink 6-8 glasses of fluid each day to assist with passing of stone fragments.  Back pain is common.  It may feel like a dull ache or back spasm.  Urine will be bloody for several days.  Slight redness or bruising may be noticed on treated side.  If you have difficulty urinating, try sitting in a bathtub of warm water.    If you have a stent, it must be managed by your urologist.  Do NOT forget.      SPECIAL INSTRUCTIONS:        Last dose of pain medication was given at:     0855AM 5mg Oxycodone- May take another pain pill when needed, then every 6 hours as needed.    0846AM Dilaudid (0.5mg)    0645AM Tylenol (1000mg). Do not exceed 4000mg of tylenol in a 24  hour period.

## 2023-08-18 NOTE — ANESTHESIA PREPROCEDURE EVALUATION
Anesthesia Evaluation     Patient summary reviewed and Nursing notes reviewed                Airway   Mallampati: I  TM distance: >3 FB  Neck ROM: full  No difficulty expected  Dental      Pulmonary - negative pulmonary ROS and normal exam    breath sounds clear to auscultation  Cardiovascular - normal exam    Rhythm: regular  Rate: normal    (+) hypertension      Neuro/Psych  (+) headaches, psychiatric history Depression  GI/Hepatic/Renal/Endo    (+) GERD    Musculoskeletal     Abdominal    Substance History - negative use     OB/GYN negative ob/gyn ROS         Other   arthritis,                   Anesthesia Plan    ASA 2     general     intravenous induction     Anesthetic plan, risks, benefits, and alternatives have been provided, discussed and informed consent has been obtained with: patient.    CODE STATUS:

## 2023-08-18 NOTE — ANESTHESIA POSTPROCEDURE EVALUATION
Patient: Rosie Medrano    Procedure Summary       Date: 08/18/23 Room / Location: [facility] OR 07 / [facility] MAIN OR    Anesthesia Start: 0726 Anesthesia Stop: 0827    Procedure: CYSTOSCOPY right URETEROSCOPY  HOLMIUM LASER STENT INSERTION, BILATERAL RETROGRADE (Right) Diagnosis:       Right ureteral stone      Gross hematuria      (Right ureteral stone [N20.1])      (Gross hematuria [R31.0])    Surgeons: Samra Mac MD Provider: Panchito Lopez MD    Anesthesia Type: general ASA Status: 2            Anesthesia Type: general    Vitals  Vitals Value Taken Time   /90 08/18/23 0851   Temp 36.6 øC (97.9 øF) 08/18/23 0824   Pulse 69 08/18/23 0855   Resp 20 08/18/23 0835   SpO2 96 % 08/18/23 0855   Vitals shown include unvalidated device data.        Post Anesthesia Care and Evaluation    Patient location during evaluation: bedside  Patient participation: complete - patient participated  Level of consciousness: awake  Pain management: adequate    Airway patency: patent  PONV Status: none  Cardiovascular status: acceptable and stable  Respiratory status: acceptable  Hydration status: acceptable    Comments: An Anesthesiologist personally participated in the most demanding procedures (including induction and emergence if applicable) in the anesthesia plan, monitored the course of anesthesia administration at frequent intervals and remained physically present and available for immediate diagnosis and treatment of emergencies.

## 2023-08-18 NOTE — H&P
[facility]   Urology Preop H&P Note    Patient Name: Rosie Medrano  : 1970  MRN: [id number]  Primary Care Physician:  Andrés Marcial MD  Referring Physician: Samra Mac MD  Date of admission: 2023    Subjective   Subjective     Reason for Consult/ Chief Complaint: Right ureteral stone [N20.1]  Gross hematuria [R31.0]    HPI:  Rosie Medrano is a 53 y.o. female history ofRight ureteral stone [N20.1]  Gross hematuria [R31.0] who presents for further management OR.  Presents for planned Procedure(s):  CYSTOSCOPY right URETEROSCOPY  HOLMIUM LASER STENT INSERTION, BILATERAL RETROGRADE;  .  Procedure to be preformed on the right.  Risk, benefits, and alternatives discussed with patient prior to today.All questions were addressed after providing time for discussion.  Patient denies significant changes since last visit.  No new complaints today.      __________  Ct abd/pel 8/10/23:  6 mm obstructing stone within mid right ureter, with mild right hydronephrosis.       Review of Systems   All systems were reviewed and negative except for the above  Personal History     Past Medical History:   Diagnosis Date    Anxiety     Depression     GERD (gastroesophageal reflux disease)     Gross hematuria     Muscle spasm     Ureteral stone        Past Surgical History:   Procedure Laterality Date     SECTION      ENDOMETRIAL ABLATION      INGUINAL HERNIA REPAIR      LAPAROSCOPIC TUBAL LIGATION         Family History: family history includes Arthritis in an other family member; COPD in an other family member; Diabetes type II in an other family member. Otherwise pertinent FHx was reviewed and not pertinent to current issue.    Social History:  reports that she has been smoking cigarettes. She started smoking about 35 years ago. She has a 30.00 pack-year smoking history. She has never used smokeless tobacco. She reports current alcohol use. She reports that she does not use drugs.    Home  Medications:  DULoxetine, HYDROcodone-acetaminophen, QUEtiapine XR, ibuprofen, and tamsulosin    Allergies:  No Known Allergies    Objective    Objective     Vitals:   Temp:  [97.6 øF (36.4 øC)] 97.6 øF (36.4 øC)  Heart Rate:  [68] 68  Resp:  [18] 18  BP: (158)/(69) 158/69    Physical Exam:   Constitutional: Awake, alert   Respiratory: Clear, nonlabored respirations    Cardiovascular: Regular rate, no chest retractions   gastrointestinal: Appears soft, nontender     Results:    Assessment & Plan   Assessment / Plan     Brief Patient Summary:  Rosie Medrano is a 53 y.o. female who     Active Hospital Problems:  Active Hospital Problems    Diagnosis     Right ureteral stone     Gross hematuria        Plan:   Proceed to the OR for planned procedure, Procedure(s):  CYSTOSCOPY right URETEROSCOPY  HOLMIUM LASER STENT INSERTION, BILATERAL RETROGRADE,  , right     Risks, benefits and alternatives were discussed with patient including bleeding, infection, damage to surrounding structures, stone recurrence, stricture.  Patient also notes understanding that if unable to reach the level of the stone or if significant purulent discharge noted upon initiation of procedure that stent will be placed in definitive stone management will be deferred until the collecting system is optimized.   Risk, benefits, and alternatives discussed with patient at length she is agreeable to proceed  Laterality identified, right  All questions addressed      Electronically signed by Samra Mac MD, 08/18/23, 7:14 AM EDT.

## 2023-08-21 LAB
LAB AP CASE REPORT: NORMAL
LAB AP CLINICAL INFORMATION: NORMAL
PATH REPORT.FINAL DX SPEC: NORMAL
PATH REPORT.GROSS SPEC: NORMAL

## 2023-08-22 ENCOUNTER — TELEPHONE (OUTPATIENT)
Dept: UROLOGY | Facility: CLINIC | Age: 53
End: 2023-08-22
Payer: COMMERCIAL

## 2023-08-22 NOTE — TELEPHONE ENCOUNTER
Patient came to the  and asked if Dr. Vaughan would refill her Percocet. Pharmacy is [facility] in [place]. Please call pt and let her know if sent or not. CB# [phone]

## 2023-08-24 ENCOUNTER — PROCEDURE VISIT (OUTPATIENT)
Dept: UROLOGY | Facility: CLINIC | Age: 53
End: 2023-08-24
Payer: COMMERCIAL

## 2023-08-24 VITALS — BODY MASS INDEX: 24.75 KG/M2 | HEIGHT: 66 IN | WEIGHT: 154 LBS | RESPIRATION RATE: 16 BRPM

## 2023-08-24 DIAGNOSIS — N13.30 HYDRONEPHROSIS, UNSPECIFIED HYDRONEPHROSIS TYPE: ICD-10-CM

## 2023-08-24 DIAGNOSIS — T19.9XXA FOREIGN BODY IN GENITOURINARY TRACT, INITIAL ENCOUNTER: Primary | ICD-10-CM

## 2023-08-24 NOTE — PROGRESS NOTES
Preoperative diagnosis  Foreign body in genitourinary tract; hydronephrosis    Postoperative diagnosis  Same as above    Procedure  Flexible cystourethroscopy with stent removal    Attending surgeon  Samra Mac MD     Anesthesia  2% lidocaine jelly intraurethrally    Complications  None    Specimen  None    Estimated blood loss  0cc    Indications  53 y.o. female who is status post right ureteroscopy with stone treatment who presents for stent removal.    Procedure  The patient was appropriately identified and brought to the cytoscopy suite. A timeout was undertaken documenting the correct patient, site, and procedure. The patient was prepped in a normal sterile fashion. A flexible cytoscope was then introduced into the bladder. The stent was identified and grasped with endoscopic graspers. The entire stent was removed and passed off the field. Patient tolerated the procedure well. There were no intraprocedural complications.     Plan  Tolerated procedure well.  Instructions provided.  Patient follow-up in 4 to 6 weeks with renal ultrasound prior or earlier as needed  All question addressed

## 2023-08-25 DIAGNOSIS — N20.1 RIGHT URETERAL STONE: ICD-10-CM

## 2023-08-25 DIAGNOSIS — R31.0 GROSS HEMATURIA: ICD-10-CM

## 2023-08-25 RX ORDER — OXYCODONE HYDROCHLORIDE AND ACETAMINOPHEN 5; 325 MG/1; MG/1
1 TABLET ORAL EVERY 6 HOURS PRN
Qty: 10 TABLET | Refills: 0 | Status: SHIPPED | OUTPATIENT
Start: 2023-08-25

## 2023-08-28 LAB
CALCIUM OXALATE DIHYDRATE MFR STONE IR: 90 %
COLOR STONE: NORMAL
COM MFR STONE: 5 %
COMPN STONE: NORMAL
HYDROXYAPATITE: 5 %
LABORATORY COMMENT REPORT: NORMAL
Lab: NORMAL
Lab: NORMAL
PHOTO: NORMAL
SIZE STONE: NORMAL MM
SPEC SOURCE SUBJ: NORMAL
STONE ANALYSIS-IMP: NORMAL
WT STONE: 60 MG

## 2023-10-09 ENCOUNTER — HOSPITAL ENCOUNTER (OUTPATIENT)
Dept: ULTRASOUND IMAGING | Facility: HOSPITAL | Age: 53
Discharge: HOME | End: 2023-10-09
Admitting: UROLOGY
Payer: COMMERCIAL

## 2023-10-09 DIAGNOSIS — N13.30 HYDRONEPHROSIS, UNSPECIFIED HYDRONEPHROSIS TYPE: ICD-10-CM

## 2023-10-09 PROCEDURE — 76775 US EXAM ABDO BACK WALL LIM: CPT

## 2023-12-19 ENCOUNTER — TELEPHONE (OUTPATIENT)
Dept: UROLOGY | Facility: CLINIC | Age: 53
End: 2023-12-19
Payer: COMMERCIAL

## 2023-12-27 NOTE — TELEPHONE ENCOUNTER
3RD CALL TO PT AND PT SAID THAT SHE IS DOING FINE AND DOES NOT WANT TO RS APPT/ANYTHING ELSE TO DO??

## 2024-02-21 DIAGNOSIS — M19.90 ARTHRITIS: ICD-10-CM

## 2024-02-21 DIAGNOSIS — F32.9 MAJOR DEPRESSIVE DISORDER, SINGLE EPISODE, UNSPECIFIED: ICD-10-CM

## 2024-02-22 RX ORDER — DULOXETIN HYDROCHLORIDE 60 MG/1
CAPSULE, DELAYED RELEASE ORAL
Qty: 30 CAPSULE | Refills: 5 | OUTPATIENT
Start: 2024-02-22

## 2024-02-22 RX ORDER — QUETIAPINE 150 MG/1
150 TABLET, FILM COATED, EXTENDED RELEASE ORAL NIGHTLY
Qty: 30 TABLET | Refills: 5 | OUTPATIENT
Start: 2024-02-22

## 2024-02-22 RX ORDER — IBUPROFEN 800 MG/1
800 TABLET ORAL DAILY PRN
Qty: 30 TABLET | Refills: 5 | OUTPATIENT
Start: 2024-02-22

## 2024-02-29 ENCOUNTER — OFFICE VISIT (OUTPATIENT)
Dept: FAMILY MEDICINE CLINIC | Facility: CLINIC | Age: 54
End: 2024-02-29
Payer: COMMERCIAL

## 2024-02-29 VITALS
WEIGHT: 156.8 LBS | HEART RATE: 84 BPM | BODY MASS INDEX: 25.2 KG/M2 | DIASTOLIC BLOOD PRESSURE: 85 MMHG | HEIGHT: 66 IN | SYSTOLIC BLOOD PRESSURE: 130 MMHG | TEMPERATURE: 97.7 F | OXYGEN SATURATION: 100 %

## 2024-02-29 DIAGNOSIS — F32.9 MAJOR DEPRESSIVE DISORDER, SINGLE EPISODE, UNSPECIFIED: ICD-10-CM

## 2024-02-29 DIAGNOSIS — R53.83 FATIGUE, UNSPECIFIED TYPE: Primary | ICD-10-CM

## 2024-02-29 DIAGNOSIS — F32.9 MAJOR DEPRESSIVE DISORDER WITH SINGLE EPISODE, REMISSION STATUS UNSPECIFIED: ICD-10-CM

## 2024-02-29 DIAGNOSIS — F32.9 CURRENT EPISODE OF MAJOR DEPRESSIVE DISORDER WITHOUT PRIOR EPISODE, UNSPECIFIED DEPRESSION EPISODE SEVERITY: ICD-10-CM

## 2024-02-29 DIAGNOSIS — E78.2 MIXED HYPERLIPIDEMIA: ICD-10-CM

## 2024-02-29 DIAGNOSIS — J02.9 PHARYNGITIS, UNSPECIFIED ETIOLOGY: ICD-10-CM

## 2024-02-29 LAB
ALBUMIN SERPL-MCNC: 4.5 G/DL (ref 3.5–5.2)
ALBUMIN/GLOB SERPL: 2 G/DL
ALP SERPL-CCNC: 103 U/L (ref 39–117)
ALT SERPL W P-5'-P-CCNC: 15 U/L (ref 1–33)
ANION GAP SERPL CALCULATED.3IONS-SCNC: 12 MMOL/L (ref 5–15)
AST SERPL-CCNC: 19 U/L (ref 1–32)
BASOPHILS # BLD AUTO: 0.11 10*3/MM3 (ref 0–0.2)
BASOPHILS NFR BLD AUTO: 1.1 % (ref 0–1.5)
BILIRUB SERPL-MCNC: 0.5 MG/DL (ref 0–1.2)
BUN SERPL-MCNC: 12 MG/DL (ref 6–20)
BUN/CREAT SERPL: 15.4 (ref 7–25)
CALCIUM SPEC-SCNC: 9.8 MG/DL (ref 8.6–10.5)
CHLORIDE SERPL-SCNC: 101 MMOL/L (ref 98–107)
CHOLEST SERPL-MCNC: 164 MG/DL (ref 0–200)
CO2 SERPL-SCNC: 24 MMOL/L (ref 22–29)
CREAT SERPL-MCNC: 0.78 MG/DL (ref 0.57–1)
DEPRECATED RDW RBC AUTO: 42.8 FL (ref 37–54)
EGFRCR SERPLBLD CKD-EPI 2021: 91.5 ML/MIN/1.73
EOSINOPHIL # BLD AUTO: 0.24 10*3/MM3 (ref 0–0.4)
EOSINOPHIL NFR BLD AUTO: 2.5 % (ref 0.3–6.2)
ERYTHROCYTE [DISTWIDTH] IN BLOOD BY AUTOMATED COUNT: 12.6 % (ref 12.3–15.4)
EXPIRATION DATE: NORMAL
FOLATE SERPL-MCNC: 11.5 NG/ML (ref 4.78–24.2)
GLOBULIN UR ELPH-MCNC: 2.3 GM/DL
GLUCOSE SERPL-MCNC: 98 MG/DL (ref 65–99)
HCT VFR BLD AUTO: 43.2 % (ref 34–46.6)
HDLC SERPL-MCNC: 42 MG/DL (ref 40–60)
HGB BLD-MCNC: 14.7 G/DL (ref 12–15.9)
IMM GRANULOCYTES # BLD AUTO: 0.02 10*3/MM3 (ref 0–0.05)
IMM GRANULOCYTES NFR BLD AUTO: 0.2 % (ref 0–0.5)
INTERNAL CONTROL: NORMAL
LDLC SERPL CALC-MCNC: 107 MG/DL (ref 0–100)
LDLC/HDLC SERPL: 2.54 {RATIO}
LYMPHOCYTES # BLD AUTO: 3.7 10*3/MM3 (ref 0.7–3.1)
LYMPHOCYTES NFR BLD AUTO: 38.1 % (ref 19.6–45.3)
Lab: NORMAL
MCH RBC QN AUTO: 32 PG (ref 26.6–33)
MCHC RBC AUTO-ENTMCNC: 34 G/DL (ref 31.5–35.7)
MCV RBC AUTO: 94.1 FL (ref 79–97)
MONOCYTES # BLD AUTO: 0.67 10*3/MM3 (ref 0.1–0.9)
MONOCYTES NFR BLD AUTO: 6.9 % (ref 5–12)
NEUTROPHILS NFR BLD AUTO: 4.97 10*3/MM3 (ref 1.7–7)
NEUTROPHILS NFR BLD AUTO: 51.2 % (ref 42.7–76)
NRBC BLD AUTO-RTO: 0 /100 WBC (ref 0–0.2)
PLATELET # BLD AUTO: 281 10*3/MM3 (ref 140–450)
PMV BLD AUTO: 9.9 FL (ref 6–12)
POTASSIUM SERPL-SCNC: 3.9 MMOL/L (ref 3.5–5.2)
PROT SERPL-MCNC: 6.8 G/DL (ref 6–8.5)
RBC # BLD AUTO: 4.59 10*6/MM3 (ref 3.77–5.28)
S PYO AG THROAT QL: NEGATIVE
SODIUM SERPL-SCNC: 137 MMOL/L (ref 136–145)
T4 FREE SERPL-MCNC: 1.2 NG/DL (ref 0.93–1.7)
TRIGL SERPL-MCNC: 76 MG/DL (ref 0–150)
TSH SERPL DL<=0.05 MIU/L-ACNC: 2.06 UIU/ML (ref 0.27–4.2)
VIT B12 BLD-MCNC: 851 PG/ML (ref 211–946)
VLDLC SERPL-MCNC: 15 MG/DL (ref 5–40)
WBC NRBC COR # BLD AUTO: 9.71 10*3/MM3 (ref 3.4–10.8)

## 2024-02-29 PROCEDURE — 80061 LIPID PANEL: CPT | Performed by: FAMILY MEDICINE

## 2024-02-29 PROCEDURE — 84439 ASSAY OF FREE THYROXINE: CPT | Performed by: FAMILY MEDICINE

## 2024-02-29 PROCEDURE — 80050 GENERAL HEALTH PANEL: CPT | Performed by: FAMILY MEDICINE

## 2024-02-29 PROCEDURE — 82607 VITAMIN B-12: CPT | Performed by: FAMILY MEDICINE

## 2024-02-29 PROCEDURE — 82746 ASSAY OF FOLIC ACID SERUM: CPT | Performed by: FAMILY MEDICINE

## 2024-02-29 RX ORDER — DULOXETIN HYDROCHLORIDE 30 MG/1
CAPSULE, DELAYED RELEASE ORAL
Qty: 21 CAPSULE | Refills: 0 | Status: SHIPPED | OUTPATIENT
Start: 2024-02-29

## 2024-02-29 RX ORDER — SACCHAROMYCES BOULARDII 250 MG
250 CAPSULE ORAL 2 TIMES DAILY
Qty: 20 CAPSULE | Refills: 0 | Status: SHIPPED | OUTPATIENT
Start: 2024-02-29 | End: 2024-03-10

## 2024-02-29 RX ORDER — SERTRALINE HYDROCHLORIDE 25 MG/1
25 TABLET, FILM COATED ORAL DAILY
Qty: 30 TABLET | Refills: 1 | Status: SHIPPED | OUTPATIENT
Start: 2024-02-29

## 2024-02-29 RX ORDER — CEFDINIR 300 MG/1
300 CAPSULE ORAL 2 TIMES DAILY
Qty: 14 CAPSULE | Refills: 0 | Status: SHIPPED | OUTPATIENT
Start: 2024-02-29 | End: 2024-03-07

## 2024-02-29 NOTE — PROGRESS NOTES
Venipuncture Blood Specimen Collection  Venipuncture performed in LEFT ARM  by Kristin Malcolm with good hemostasis. Patient tolerated the procedure well without complications.   02/29/24   Kristin Malcolm

## 2024-02-29 NOTE — PROGRESS NOTES
Chief Complaint  Annual Exam, Hyperlipidemia, and Depression    Subjective          Rosie Medrano presents to [facility]  History of Present Illness  Pt has depression- not being helped by seroquel and cymbalta, has tried effexor also- has a lot of fatigue and dec energy on these meds- will stop seroquel and wean cymbalta  Hyperlipidemia  This is a chronic problem. The current episode started more than 1 year ago. The problem is controlled. Recent lipid tests were reviewed and are variable. There are no known factors aggravating her hyperlipidemia. Pertinent negatives include no chest pain, focal sensory loss, focal weakness, leg pain, myalgias or shortness of breath. Current antihyperlipidemic treatment includes diet change. The current treatment provides moderate improvement of lipids. There are no compliance problems.  Risk factors for coronary artery disease include dyslipidemia and post-menopausal.   Depression  Visit Type: follow-up  Patient presents with the following symptoms: depressed mood and fatigue.  Patient is not experiencing: anhedonia, chest pain, choking sensation, compulsions, confusion, decreased concentration, dizziness, dry mouth, excessive worry, feelings of hopelessness, feelings of worthlessness, hypersomnia, hyperventilation, insomnia, irritability, malaise, memory impairment, muscle tension, nausea, nervousness/anxiety, obsessions, palpitations, panic, psychomotor agitation, psychomotor retardation, restlessness, shortness of breath, suicidal ideas, suicidal planning, thoughts of death, weight gain and weight loss.  Frequency of symptoms: constantly   Severity: moderate   Sleep quality: good  Nighttime awakenings: none  Compliance with medications:  %  Treatment side effects: fatigue.  URI   This is a new problem. The current episode started today. The problem has been unchanged. There has been no fever. Associated symptoms include a sore throat.  Pertinent negatives include no abdominal pain, chest pain, congestion, coughing, diarrhea, dysuria, ear pain, headaches, joint pain, joint swelling, nausea, neck pain, plugged ear sensation, rash, rhinorrhea, sinus pain, sneezing, swollen glands, vomiting or wheezing. She has tried nothing for the symptoms.              Objective   No Known Allergies    There is no immunization history on file for this patient.  Past Medical History:   Diagnosis Date    Anxiety     Depression     GERD (gastroesophageal reflux disease)     Gross hematuria     Kidney stone     Muscle spasm     Ureteral stone       Past Surgical History:   Procedure Laterality Date     SECTION      ENDOMETRIAL ABLATION      INGUINAL HERNIA REPAIR      LAPAROSCOPIC TUBAL LIGATION      URETEROSCOPY LASER LITHOTRIPSY WITH STENT INSERTION Right 2023    Procedure: CYSTOSCOPY right URETEROSCOPY  HOLMIUM LASER STENT INSERTION, BILATERAL RETROGRADE;  Surgeon: Samra Mac MD;  Location: [facility];  Service: Urology;  Laterality: Right;      Social History     Socioeconomic History    Marital status:    Tobacco Use    Smoking status: Every Day     Packs/day: 1.00     Years: 30.00     Additional pack years: 0.00     Total pack years: 30.00     Types: Cigarettes     Start date: 1987    Smokeless tobacco: Never   Vaping Use    Vaping Use: Never used   Substance and Sexual Activity    Alcohol use: Yes     Comment: occasionally    Drug use: Never    Sexual activity: Defer        Current Outpatient Medications:     ibuprofen (ADVIL,MOTRIN) 800 MG tablet, TAKE 1 TABLET BY MOUTH DAILY AS NEEDED FOR MILD PAIN., Disp: 30 tablet, Rfl: 5    cefdinir (OMNICEF) 300 MG capsule, Take 1 capsule by mouth 2 (Two) Times a Day for 7 days., Disp: 14 capsule, Rfl: 0    DULoxetine (CYMBALTA) 30 MG capsule, Take 1 tab PO Daily x 14 days then 1 tab PO every other day x 14 days, Disp: 21 capsule, Rfl: 0    saccharomyces boulardii (Florastor) 250 MG  "capsule, Take 1 capsule by mouth 2 (Two) Times a Day for 10 days., Disp: 20 capsule, Rfl: 0    sertraline (Zoloft) 25 MG tablet, Take 1 tablet by mouth Daily., Disp: 30 tablet, Rfl: 1   Family History   Problem Relation Age of Onset    Hypertension Mother     Depression Mother     Diabetes Mother     Anxiety disorder Mother     Kidney disease Mother     Multiple sclerosis Sister     No Known Problems Daughter     No Known Problems Son     No Known Problems Son     Arthritis Other     COPD Other     Diabetes type II Other     Malig Hyperthermia Neg Hx           Vital Signs:   Vitals:    02/29/24 1026 02/29/24 1123   BP: 132/90 130/85   BP Location: Right arm    Patient Position: Sitting    Cuff Size: Adult    Pulse: 84    Temp: 97.7 °F (36.5 °C)    SpO2: 100%    Weight: 71.1 kg (156 lb 12.8 oz)    Height: 167.6 cm (66\")        Review of Systems   Constitutional:  Negative for irritability, weight gain and weight loss.   HENT:  Positive for sore throat. Negative for congestion, ear pain, rhinorrhea, sinus pain and sneezing.    Respiratory:  Negative for cough, choking, shortness of breath and wheezing.    Cardiovascular:  Negative for chest pain and palpitations.   Gastrointestinal:  Negative for abdominal pain, diarrhea, nausea and vomiting.   Genitourinary:  Negative for dysuria.   Musculoskeletal:  Negative for joint pain, myalgias and neck pain.   Skin:  Negative for rash.   Neurological:  Negative for focal weakness and headaches.   Psychiatric/Behavioral:  Negative for confusion, decreased concentration and suicidal ideas. The patient is not nervous/anxious and does not have insomnia.       Physical Exam  Vitals reviewed.   Constitutional:       Appearance: Normal appearance. She is well-developed.   HENT:      Head: Normocephalic and atraumatic.      Right Ear: Tympanic membrane, ear canal and external ear normal.      Left Ear: Tympanic membrane, ear canal and external ear normal.      Nose: Nose normal.      " Mouth/Throat:      Mouth: Mucous membranes are moist.      Pharynx: Oropharynx is clear. Posterior oropharyngeal erythema present. No oropharyngeal exudate.   Eyes:      Conjunctiva/sclera: Conjunctivae normal.      Pupils: Pupils are equal, round, and reactive to light.   Cardiovascular:      Rate and Rhythm: Normal rate and regular rhythm.      Pulses: Normal pulses.      Heart sounds: Normal heart sounds. No murmur heard.     No friction rub. No gallop.   Pulmonary:      Effort: Pulmonary effort is normal.      Breath sounds: Normal breath sounds. No wheezing or rhonchi.   Abdominal:      General: Abdomen is flat. Bowel sounds are normal. There is no distension.      Palpations: Abdomen is soft. There is no mass.      Tenderness: There is no abdominal tenderness. There is no guarding or rebound.      Hernia: No hernia is present.   Musculoskeletal:         General: Normal range of motion.      Cervical back: Normal range of motion and neck supple.   Skin:     General: Skin is warm and dry.      Capillary Refill: Capillary refill takes less than 2 seconds.   Neurological:      General: No focal deficit present.      Mental Status: She is alert and oriented to person, place, and time.      Cranial Nerves: No cranial nerve deficit.   Psychiatric:         Mood and Affect: Mood and affect normal.         Behavior: Behavior normal.         Thought Content: Thought content normal.         Judgment: Judgment normal.        Result Review :   The following data was reviewed by: Andrés Marcial MD on 02/29/2024:  CMP          4/27/2023    14:10   CMP   Glucose 122    BUN 13    Creatinine 1.06    EGFR 63.7    Sodium 140    Potassium 3.8    Chloride 104    Calcium 10.0    Total Protein 6.7    Albumin 4.5    Globulin 2.2    Total Bilirubin 0.5    Alkaline Phosphatase 106    AST (SGOT) 15    ALT (SGPT) 14    Albumin/Globulin Ratio 2.0    BUN/Creatinine Ratio 12.3    Anion Gap 11.5      CBC          4/27/2023    14:10   CBC    WBC 9.20    RBC 4.90    Hemoglobin 15.8    Hematocrit 45.7    MCV 93.3    MCH 32.2    MCHC 34.6    RDW 12.6    Platelets 324                    Assessment and Plan    Diagnoses and all orders for this visit:    1. Fatigue, unspecified type (Primary)  -     CBC Auto Differential  -     Vitamin B12 & Folate  -     TSH+Free T4    2. Major depressive disorder, single episode, unspecified  -     DULoxetine (CYMBALTA) 30 MG capsule; Take 1 tab PO Daily x 14 days then 1 tab PO every other day x 14 days  Dispense: 21 capsule; Refill: 0  -     sertraline (Zoloft) 25 MG tablet; Take 1 tablet by mouth Daily.  Dispense: 30 tablet; Refill: 1    3. Mixed hyperlipidemia  -     Comprehensive Metabolic Panel  -     Lipid Panel    4. Pharyngitis, unspecified etiology  -     POCT rapid strep A  -     cefdinir (OMNICEF) 300 MG capsule; Take 1 capsule by mouth 2 (Two) Times a Day for 7 days.  Dispense: 14 capsule; Refill: 0  -     saccharomyces boulardii (Florastor) 250 MG capsule; Take 1 capsule by mouth 2 (Two) Times a Day for 10 days.  Dispense: 20 capsule; Refill: 0    5. Major depressive disorder with single episode, remission status unspecified [F32.9]    6. Current episode of major depressive disorder without prior episode, unspecified depression episode severity [F32.9]            Follow Up   Return in about 2 weeks (around 3/14/2024), or if symptoms worsen or fail to improve, for Recheck.  Patient was given instructions and counseling regarding her condition or for health maintenance advice. Please see specific information pulled into the AVS if appropriate.

## 2024-03-14 ENCOUNTER — OFFICE VISIT (OUTPATIENT)
Dept: FAMILY MEDICINE CLINIC | Facility: CLINIC | Age: 54
End: 2024-03-14
Payer: COMMERCIAL

## 2024-03-14 VITALS
TEMPERATURE: 97.5 F | WEIGHT: 155.2 LBS | DIASTOLIC BLOOD PRESSURE: 85 MMHG | BODY MASS INDEX: 25.05 KG/M2 | SYSTOLIC BLOOD PRESSURE: 130 MMHG | HEART RATE: 93 BPM | OXYGEN SATURATION: 99 %

## 2024-03-14 DIAGNOSIS — M19.90 ARTHRITIS: ICD-10-CM

## 2024-03-14 DIAGNOSIS — R53.83 FATIGUE, UNSPECIFIED TYPE: Primary | ICD-10-CM

## 2024-03-14 DIAGNOSIS — F32.9 MAJOR DEPRESSIVE DISORDER, SINGLE EPISODE, UNSPECIFIED: ICD-10-CM

## 2024-03-14 RX ORDER — IBUPROFEN 800 MG/1
800 TABLET ORAL DAILY PRN
Qty: 30 TABLET | Refills: 5 | Status: SHIPPED | OUTPATIENT
Start: 2024-03-14

## 2024-03-14 NOTE — PROGRESS NOTES
Chief Complaint  Depression (Follow up/)    Subjective          Rosie Medrano presents to [facility]  History of Present Illness  Pt says she is almost off cymbalta- pt tolerating zoloft well  Depression  Visit Type: follow-up  Patient presents with the following symptoms: depressed mood.  Patient is not experiencing: anhedonia, chest pain, choking sensation, compulsions, confusion, decreased concentration, dizziness, dry mouth, excessive worry, fatigue, feelings of hopelessness, feelings of worthlessness, hypersomnia, hyperventilation, insomnia, irritability, malaise, memory impairment, muscle tension, nausea, nervousness/anxiety, obsessions, palpitations, panic, psychomotor agitation, psychomotor retardation, restlessness, shortness of breath, suicidal ideas, suicidal planning, thoughts of death, weight gain and weight loss.  Frequency of symptoms: constantly   Severity: moderate   Sleep quality: good  Nighttime awakenings: none  Compliance with medications:  %  Treatment side effects: no side effects.               Objective   No Known Allergies    There is no immunization history on file for this patient.  Past Medical History:   Diagnosis Date    Anxiety     Depression     GERD (gastroesophageal reflux disease)     Gross hematuria     Kidney stone     Muscle spasm     Ureteral stone       Past Surgical History:   Procedure Laterality Date     SECTION      ENDOMETRIAL ABLATION      INGUINAL HERNIA REPAIR      LAPAROSCOPIC TUBAL LIGATION      URETEROSCOPY LASER LITHOTRIPSY WITH STENT INSERTION Right 2023    Procedure: CYSTOSCOPY right URETEROSCOPY  HOLMIUM LASER STENT INSERTION, BILATERAL RETROGRADE;  Surgeon: Samra Mac MD;  Location: [facility];  Service: Urology;  Laterality: Right;      Social History     Socioeconomic History    Marital status:    Tobacco Use    Smoking status: Every Day     Current packs/day: 1.00     Average  packs/day: 1 pack/day for 36.3 years (36.3 ttl pk-yrs)     Types: Cigarettes     Start date: 11/19/1987    Smokeless tobacco: Never   Vaping Use    Vaping status: Never Used   Substance and Sexual Activity    Alcohol use: Yes     Comment: occasionally    Drug use: Never    Sexual activity: Defer        Current Outpatient Medications:     ibuprofen (ADVIL,MOTRIN) 800 MG tablet, Take 1 tablet by mouth Daily As Needed for Mild Pain., Disp: 30 tablet, Rfl: 5    sertraline (Zoloft) 50 MG tablet, Take 1 tablet by mouth Daily., Disp: 30 tablet, Rfl: 1   Family History   Problem Relation Age of Onset    Hypertension Mother     Depression Mother     Diabetes Mother     Anxiety disorder Mother     Kidney disease Mother     Multiple sclerosis Sister     No Known Problems Daughter     No Known Problems Son     No Known Problems Son     Arthritis Other     COPD Other     Diabetes type II Other     Malig Hyperthermia Neg Hx           Vital Signs:   Vitals:    03/14/24 0838 03/14/24 0850   BP: 130/90 130/85   Pulse: 93    Temp: 97.5 °F (36.4 °C)    SpO2: 99%    Weight: 70.4 kg (155 lb 3.2 oz)        Review of Systems   Constitutional:  Negative for fatigue, fever, irritability, weight gain and weight loss.   HENT:  Negative for sore throat.    Eyes:  Negative for visual disturbance.   Respiratory:  Negative for cough, choking, chest tightness, shortness of breath and wheezing.    Cardiovascular:  Negative for chest pain, palpitations and leg swelling.   Gastrointestinal:  Negative for abdominal pain, diarrhea, nausea and vomiting.   Neurological:  Negative for light-headedness and headaches.   Psychiatric/Behavioral:  Positive for sleep disturbance. Negative for confusion, decreased concentration and suicidal ideas. The patient is not nervous/anxious and does not have insomnia.       Physical Exam  Vitals reviewed.   Constitutional:       Appearance: Normal appearance. She is well-developed.   HENT:      Head: Normocephalic and  atraumatic.      Right Ear: External ear normal.      Left Ear: External ear normal.      Mouth/Throat:      Pharynx: No oropharyngeal exudate.   Eyes:      Conjunctiva/sclera: Conjunctivae normal.      Pupils: Pupils are equal, round, and reactive to light.   Cardiovascular:      Rate and Rhythm: Normal rate and regular rhythm.      Pulses: Normal pulses.      Heart sounds: Normal heart sounds. No murmur heard.     No friction rub. No gallop.   Pulmonary:      Effort: Pulmonary effort is normal.      Breath sounds: Normal breath sounds. No wheezing or rhonchi.   Abdominal:      General: Abdomen is flat. Bowel sounds are normal. There is no distension.      Palpations: Abdomen is soft. There is no mass.      Tenderness: There is no abdominal tenderness. There is no guarding or rebound.      Hernia: No hernia is present.   Musculoskeletal:         General: Normal range of motion.   Skin:     General: Skin is warm and dry.      Capillary Refill: Capillary refill takes less than 2 seconds.   Neurological:      General: No focal deficit present.      Mental Status: She is alert and oriented to person, place, and time.      Cranial Nerves: No cranial nerve deficit.   Psychiatric:         Mood and Affect: Mood and affect normal.         Behavior: Behavior normal.         Thought Content: Thought content normal.         Judgment: Judgment normal.        Result Review :   The following data was reviewed by: Andrés Marcial MD on 03/14/2024:  CMP          4/27/2023    14:10 2/29/2024    11:32   CMP   Glucose 122  98    BUN 13  12    Creatinine 1.06  0.78    EGFR 63.7  91.5    Sodium 140  137    Potassium 3.8  3.9    Chloride 104  101    Calcium 10.0  9.8    Total Protein 6.7  6.8    Albumin 4.5  4.5    Globulin 2.2  2.3    Total Bilirubin 0.5  0.5    Alkaline Phosphatase 106  103    AST (SGOT) 15  19    ALT (SGPT) 14  15    Albumin/Globulin Ratio 2.0  2.0    BUN/Creatinine Ratio 12.3  15.4    Anion Gap 11.5  12.0      CBC           4/27/2023    14:10 2/29/2024    11:32   CBC   WBC 9.20  9.71    RBC 4.90  4.59    Hemoglobin 15.8  14.7    Hematocrit 45.7  43.2    MCV 93.3  94.1    MCH 32.2  32.0    MCHC 34.6  34.0    RDW 12.6  12.6    Platelets 324  281      Lipid Panel          2/29/2024    11:32   Lipid Panel   Total Cholesterol 164    Triglycerides 76    HDL Cholesterol 42    VLDL Cholesterol 15    LDL Cholesterol  107    LDL/HDL Ratio 2.54      TSH          2/29/2024    11:32   TSH   TSH 2.060                Assessment and Plan    Diagnoses and all orders for this visit:    1. Fatigue, unspecified type (Primary)  -     Ambulatory Referral to Sleep Medicine    2. Major depressive disorder, single episode, unspecified  -     sertraline (Zoloft) 50 MG tablet; Take 1 tablet by mouth Daily.  Dispense: 30 tablet; Refill: 1    3. Arthritis  -     ibuprofen (ADVIL,MOTRIN) 800 MG tablet; Take 1 tablet by mouth Daily As Needed for Mild Pain.  Dispense: 30 tablet; Refill: 5            Follow Up   Return in about 3 weeks (around 4/4/2024) for Recheck.  Patient was given instructions and counseling regarding her condition or for health maintenance advice. Please see specific information pulled into the AVS if appropriate.

## 2024-03-21 DIAGNOSIS — F32.9 MAJOR DEPRESSIVE DISORDER, SINGLE EPISODE, UNSPECIFIED: ICD-10-CM

## 2024-03-21 RX ORDER — QUETIAPINE 150 MG/1
150 TABLET, FILM COATED, EXTENDED RELEASE ORAL NIGHTLY
Qty: 30 TABLET | Refills: 0 | OUTPATIENT
Start: 2024-03-21

## 2024-03-21 RX ORDER — DULOXETIN HYDROCHLORIDE 30 MG/1
CAPSULE, DELAYED RELEASE ORAL
Qty: 21 CAPSULE | Refills: 0 | OUTPATIENT
Start: 2024-03-21

## 2024-03-22 ENCOUNTER — OFFICE VISIT (OUTPATIENT)
Dept: SLEEP MEDICINE | Facility: HOSPITAL | Age: 54
End: 2024-03-22
Payer: COMMERCIAL

## 2024-03-22 VITALS
OXYGEN SATURATION: 95 % | HEART RATE: 92 BPM | WEIGHT: 153 LBS | DIASTOLIC BLOOD PRESSURE: 57 MMHG | HEIGHT: 66 IN | BODY MASS INDEX: 24.59 KG/M2 | SYSTOLIC BLOOD PRESSURE: 137 MMHG

## 2024-03-22 DIAGNOSIS — F32.A DEPRESSION, UNSPECIFIED DEPRESSION TYPE: ICD-10-CM

## 2024-03-22 DIAGNOSIS — R90.89 ABNORMAL BRAIN MRI: ICD-10-CM

## 2024-03-22 DIAGNOSIS — R06.81 WITNESSED EPISODE OF APNEA: ICD-10-CM

## 2024-03-22 DIAGNOSIS — R29.818 SUSPECTED SLEEP APNEA: Primary | ICD-10-CM

## 2024-03-22 DIAGNOSIS — R53.83 OTHER FATIGUE: ICD-10-CM

## 2024-03-22 DIAGNOSIS — F41.9 ANXIETY: ICD-10-CM

## 2024-03-22 DIAGNOSIS — G47.19 EXCESSIVE DAYTIME SLEEPINESS: ICD-10-CM

## 2024-03-22 DIAGNOSIS — R06.83 SNORING: ICD-10-CM

## 2024-03-22 DIAGNOSIS — Z72.821 INADEQUATE SLEEP HYGIENE: ICD-10-CM

## 2024-03-22 PROCEDURE — G0463 HOSPITAL OUTPT CLINIC VISIT: HCPCS

## 2024-03-22 NOTE — PROGRESS NOTES
[facility]  [address] [place] [address]  [place]   [place] [address]  Phone: [phone]  Fax: [phone]      Rosie Medrano  [id number]   1970  53 y.o.  female      Referring physician/provider and  PCP Andrés Marcial MD    Type of service: Initial Sleep Medicine Consult.  Date of service: 3/22/2024      Chief Complaint   Patient presents with    Snoring    Witnessed Apnea       History of present illness;  The patient was seen today on 3/22/2024 at [facility].    Thank you for asking to see Rosie Medrano, 53 y.o. PMHx anxiety, depression, headaches, abnormal MRI brain (8/21/2021 MRI brain radiologist differential to have included demyelinating disease such as multiple sclerosis).  The patient presents for initial evaluation of sleep sleep disordered breathing.  Patient  denies prior surgery namely tonsillectomy, nasal surgery or UPPP.     Patient says she often wakes up gasping for air in her sleep  No stated associated symptoms at those times save anxiety  Goes back to sleep can occur more than once a night  Ongoing nightly for the past 1 year  Her mother has sleep apnea   Patient never had sleep study    Anxiety/Depression not well controlled no si/hi  States her PCP just increased her zoloft to 50 mg/d  Takes zoloft AFTER she wakes up     Abnormal MRI brain states her PCP recommended neurology evaluation in the past she did not follow up with neurology   Differential by radiology included demyelinating disease such as multiple sclerosis    Continues to smoke 1 ppd since age 21   Quit for short period in the past on her own  but then started again     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 Reference: Stone DICKINSON et al. Br J Anaesth, 2012.     Criterion    Yes    No  Do you SNORE loudly?   [x]   Yes  []   No   Do you often feel TIRED, fatigued, or sleepy during the day?    [x]    Yes  []   No  Has anyone OBSERVED you stop breathing during your sleep?    [x]   Yes  []   No  Do you have or are you being treated for high blood PRESSURE?    []   Yes  [x]   No  BMI >32 kg/m2     []   Yes  [x]   No  AGE > 50 years    [x]   Yes  []   No  NECK circumference >16 inches / 40 cm    []   Yes  [x]   No  GENDER: male     []   Yes  [x]   No    CANDY Probability:  []   1-2 - Low  [x]   3-4 - Intermediate  []   5-8 - High      Further Sleep History:    Bedtime: Weekdays 8 AM (shift worker-no near miss no obesity no workplace injury due to sleepiness)  Weekends 11 PM  Rise Time: Weekdays 3 PM; weekends 10 AM  Sleep Latency: 30-60 minutes  Screens in bed: Yes until fall asleep watches lifetime shows/movies  Wake after sleep onset: 1 or more times per night  Reasons for awakenings: Apnea, nocturia  Number of naps per day denies  Naps restorative: Not applicable  Caffeine use: 2 or more beverages per day    RLS Symptoms: No   Bruxism:No   Current sleep related gastroesophageal reflux symptoms:  No   Cataplexy:  No   Sleep Paralysis:  No   Hypnagogic or hypnopompic hallucinations: No   Parasomnias such as sleep walking or sleep eating No     Disclaimer Sleep History: The above sleep history is based on this sleep physician's in room encounter with the patient. Pre encounter self administered questionnaires are taken into consideration and discussed with patient for any discordance. The above documentation by this sleep physician is the most accurate clinical information determined by in room sleep physician encounter with patient.     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 Pre encounter self administered questionnaires are taken into consideration and discussed with patient for any discordance. The above documentation by this sleep physician is the most accurate clinical information determined by in room sleep physician encounter with patient.     Physical exam:  Vitals:    03/22/24 1002   BP: 137/57   BP Location: Left arm   Patient Position: Sitting   Pulse: 92   SpO2: 95%   Weight: 69.4 kg (153 lb)   Height: 167.6 cm (65.98\")    Body mass index is 24.71 kg/m².   CONSTITUTIONAL:  Non-toxic, In no overt distress   Head: normocephalic   ENT: Mallampati class I, + macroglossia, no septal defects   NECK:Neck Circumference: 13 inches,no nuchal rigidity  RESPIRATORY SYSTEM: Breath sounds are clear (no rales, no rhonchi, no wheezes), no accessory muscle use  CARDIOVASULAR SYSTEM: Heart sounds are regular rhythm and normal rate, no rub, no gallop, no edema  NEUROLOGICAL SYSTEM: Oriented x 3, No gross focal deficits   PSYCHIATRIC SYSTEM: Goal oriented, affect full range appropriate      Office notes from care team reviewed:      - 3/14/2024 office visit PCP Dr. Andrés Marcial concern for fatigue referred to sleep medicine same visit major depressive disorder placed on Zoloft 50 mg BMI that visit 25.05 kg/m²          Labs reviewed.  TSH          2/29/2024    11:32   TSH   TSH 2.060          - 2/29/2024  H&H 14.7/43.2  MCV 94.1    Imaging/Diagnostics reviewed:     8/12/2021 MRI brain with and without contrast for chronic headaches radiologist's conclusion:   CONCLUSION:   1. Nonspecific punctate T2/FLAIR hyperintensities are seen within the cerebral white matter,   especially in the subcortical and central regions, as discussed.  " There is no associated mass   effect or enhancement or susceptibility abnormality with these findings.  Differential   possibilities are many and would include (but are not necessarily limited to) demyelinating disease   (such as multiple sclerosis [MS]), an infectious process (such as with Lyme disease or   histoplasmosis), chronic small vessel ischemia/infarction, vasculitis, neurosarcoidosis, systemic   lupus erythematosus (SLE), and migraine headaches.    2. No acute infarct is seen.    3. No acute intracranial hemorrhage.    4. No midline shift or acute intracranial herniation syndrome is seen.    5. No abnormal enhancement.    6. The ventricular size and configuration are within normal limits.  RAMY HORNE JR, MD      Assessment and plan:  Suspected sleep apnea [R29.818] patient's symptoms and examination is consistent with sleep apnea (G47.30). I have talked to the patient about the signs and symptoms of sleep apnea. In addition, I have also discussed pathophysiology of sleep apnea.  I also discussed the complications of untreated sleep apnea including effects on hypertension, diabetes mellitus and nonrestorative sleep with hypersomnia which can increase risk for motor vehicle accidents.  Untreated sleep apnea is also a risk factor for development of atrial fibrillation, hypertension, insulin resistance and cerebrovascular accident.  Discussed in detail of various testing methods including home-based and lab based sleep studies.  Based on history and physical examination and other comorbidities the most appropriate study is Home Sleep Study.  The order for the sleep study is placed in [facility].  The test will be scheduled after approval from insurance. Treatment and management will be discussed after the test is completed.  Patient was given opportunity to ask questions and all the questions were answered.   Snoring (R06.83), snoring is the sound created by turbulent airflow vibrating upper airway soft tissue  due to limitation of inspiratory airflow. I have also discussed factors affecting snoring including sleep deprivation, sleeping on the back and alcohol ingestion. To minimize snoring, patient is advised to have adequate sleep, sleep on the side and avoid alcohol and sedative medications before bedtime  Excessive daytime sleepiness/Other fatigue  .  Patient endorses subjective excessive daytime sleepiness with sleep physician encounter which was consistent with patient's pre-encounter self-administered [place] Sleepiness Scale of Total score: 14.  There are many causes for daytime excessive sleepiness including depression, shiftwork syndrome, and other medical disorders including neurologic conditions, heart, kidney and liver failure.  From sleep disorders perspective this is sleep disordered breathing until proven otherwise. The most common cause of excessive sleepiness is due to sleep apnea with frequent awakenings during sleep time.  I have discussed safety of driving and to remain vigilant while driving; patient verbalized understanding of counseling.  Extensive Counseling NO Drowsy Driving:  -Counseled the patient if an automobile crash or near-crash occurs due to sleepiness or inattention, that the patient should stop driving and operating dangerous machinery until their sleep disorder has been treated and the patient no longer becomes sleepy or inattentive while driving. Counseled the patient it is their responsibility not to drive if they are sleepy or inattentive while driving. Counseled the patient that driving while drowsy may cause serious injury or death to to the patient or others. Counseled the patient to never place themself or others in situations where drowsiness can result in injury.  -Counseled the patient regarding the symptoms of drowsy driving include difficulty focusing, frequent blinking, heavy eyelids, daydreaming, wandering/disconnected thoughts, difficulty remembering the last few miles  "driven (sometimes called \"automatic behavior\") or missing exits and street signs, frequent yawning, rubbing eyes, difficulty keeping head up, drifting from dustin to dustin, tailgating or hitting a shoulder, hitting rumble strips on the road, and feeling restless and irritable.  -Counseled the patient drivers are often unaware of their own level of sleepiness, as individuals ability to self rate sleepiness and performance is often unreliable. Counseled the patient to err on the side of caution if they feel drowsy and to arrange for alternative modes of transportation such as ride sharing, public transportation, taxi, uber, friend-family, or walking. Counseld to plan ahead so they avoid driving during times of the day when they are likely to be sleepy such as early morning, mid-afternoon, late at night, or after a period of sleep deprivation, and to keep their trips short (under 20 minutes) or arrange for alternatives mode of transport.  -Counseled the patient the best countermeasure for combating drowsy driving is to obtain adequate sleep, as sleep debt can only be repaid with sleep. Counseled if they experience drowsiness when driving, they should pull off the road to a safe area as soon as possible and sleep. Counseled even a brief 20-minute nap can improve neurobehavioral performance after sleep deprivation. Counseled longer naps may lead to sleep inertia, which can reduce performance during the first 30 minutes after waking. Patient verbalized understanding of my counseling instructions as stated above.   Normal BMI, patient's BMI is Body mass index is 24.71 kg/m²..  Weight loss not indicated.  Follow-up with PCP to review preventive care  Inadequate sleep hygiene [Z72.821]  Counseled the patient lifestyle modifications as below to be applied especially night of any sleep study, the patient verbalized understanding of same. Follow up with PCP to reinforce my counseling towards healthy lifestyle modifications if " sleep studies are negative.  Anxiety/Depression,  Follow up with primary care physician for continued management. Comorbid mood disorders would make the patient eligible for a trial of PAP therapy even if sleep study reveals mild severity sleep apnea.  Abnormal MRI Brain, Follow up with PCP to review and discuss referral to neurology.  Clinical notes were forwarded to the patient's primary care physician.    I have also discussed with the patient the following  Sleep hygiene: Maintaining a regular bedtime and wake time, not to watch television or work in bed, limit caffeine-containing beverages before bed time and avoid naps during the day  Adequate amount of sleep.  Generally most people needs about 7 to 8 hours of sleep.      Return for 31 to 90 days after PAP setup with down load.  Patient's questions were answered      I once again thank you for asking me to see this patient in consultation and I have forwarded my opinion and treatment plan.  Please do not hesitate to call me if you have any questions.       EMR Dragon/Transcription disclaimer:   Much of this encounter note is an electronic transcription/translation of spoken language to printed text. The electronic translation of spoken language may permit erroneous, or at times, nonsensical words or phrases to be inadvertently transcribed; Although I have reviewed the note for such errors, some may still exist.     NPI #: [id number]    Jaye Higgins, DO  Sleep Medicine  [facility]  03/22/24

## 2024-04-11 ENCOUNTER — OFFICE VISIT (OUTPATIENT)
Dept: FAMILY MEDICINE CLINIC | Facility: CLINIC | Age: 54
End: 2024-04-11
Payer: COMMERCIAL

## 2024-04-11 VITALS
WEIGHT: 155.2 LBS | OXYGEN SATURATION: 98 % | TEMPERATURE: 97.3 F | SYSTOLIC BLOOD PRESSURE: 110 MMHG | DIASTOLIC BLOOD PRESSURE: 70 MMHG | BODY MASS INDEX: 25.06 KG/M2 | HEART RATE: 86 BPM

## 2024-04-11 DIAGNOSIS — R31.9 HEMATURIA, UNSPECIFIED TYPE: ICD-10-CM

## 2024-04-11 DIAGNOSIS — F32.9 MAJOR DEPRESSIVE DISORDER WITH SINGLE EPISODE, REMISSION STATUS UNSPECIFIED: ICD-10-CM

## 2024-04-11 DIAGNOSIS — M54.50 ACUTE RIGHT-SIDED LOW BACK PAIN WITHOUT SCIATICA: Primary | ICD-10-CM

## 2024-04-11 DIAGNOSIS — F32.9 MAJOR DEPRESSIVE DISORDER, SINGLE EPISODE, UNSPECIFIED: ICD-10-CM

## 2024-04-11 LAB
BILIRUB BLD-MCNC: ABNORMAL MG/DL
CLARITY, POC: CLEAR
COLOR UR: YELLOW
EXPIRATION DATE: ABNORMAL
GLUCOSE UR STRIP-MCNC: NEGATIVE MG/DL
KETONES UR QL: NEGATIVE
LEUKOCYTE EST, POC: NEGATIVE
Lab: ABNORMAL
NITRITE UR-MCNC: NEGATIVE MG/ML
PH UR: 5 [PH] (ref 5–8)
PROT UR STRIP-MCNC: ABNORMAL MG/DL
RBC # UR STRIP: ABNORMAL /UL
SP GR UR: 1.03 (ref 1–1.03)
UROBILINOGEN UR QL: ABNORMAL

## 2024-04-11 PROCEDURE — 80053 COMPREHEN METABOLIC PANEL: CPT | Performed by: FAMILY MEDICINE

## 2024-04-11 PROCEDURE — 87086 URINE CULTURE/COLONY COUNT: CPT | Performed by: FAMILY MEDICINE

## 2024-04-11 PROCEDURE — 83735 ASSAY OF MAGNESIUM: CPT | Performed by: FAMILY MEDICINE

## 2024-04-11 PROCEDURE — 81001 URINALYSIS AUTO W/SCOPE: CPT | Performed by: FAMILY MEDICINE

## 2024-04-11 PROCEDURE — 85025 COMPLETE CBC W/AUTO DIFF WBC: CPT | Performed by: FAMILY MEDICINE

## 2024-04-11 RX ORDER — TAMSULOSIN HYDROCHLORIDE 0.4 MG/1
1 CAPSULE ORAL DAILY
Qty: 30 CAPSULE | Refills: 0 | Status: SHIPPED | OUTPATIENT
Start: 2024-04-11

## 2024-04-11 RX ORDER — SERTRALINE HYDROCHLORIDE 100 MG/1
100 TABLET, FILM COATED ORAL DAILY
Qty: 30 TABLET | Refills: 1 | Status: SHIPPED | OUTPATIENT
Start: 2024-04-11

## 2024-04-11 RX ORDER — TRAMADOL HYDROCHLORIDE 50 MG/1
50 TABLET ORAL EVERY 6 HOURS PRN
Qty: 28 TABLET | Refills: 0 | Status: SHIPPED | OUTPATIENT
Start: 2024-04-11

## 2024-04-11 RX ORDER — CYCLOBENZAPRINE HCL 5 MG
5 TABLET ORAL 3 TIMES DAILY PRN
Qty: 90 TABLET | Refills: 0 | Status: SHIPPED | OUTPATIENT
Start: 2024-04-11

## 2024-04-11 NOTE — PROGRESS NOTES
Venipuncture Blood Specimen Collection  Venipuncture performed in left arm  by Kristin Malcolm with good hemostasis. Patient tolerated the procedure well without complications.   04/11/24   Kristin Malcolm

## 2024-04-11 NOTE — PROGRESS NOTES
Chief Complaint  Fatigue and Depression    Subjective          Rosie Medrano presents to [facility]  History of Present Illness  Pt following up for depression- being helped with zoloft- pt off cymbalta- needs to inc dose of zoloft- no SI or HI    Pt has no h/o seizures  Depression  Presents for follow-up visit. Symptoms include depressed mood. Patient is not experiencing: compulsions, confusion, decreased concentration, dry mouth, excessive worry, feelings of hopelessness, feelings of worthlessness, hypersomnia, hyperventilation, insomnia, irritability, muscle tension, nervousness/anxiety, obsessions, palpitations, panic, psychomotor agitation, psychomotor retardation, shortness of breath, suicidal ideas, suicidal planning, thoughts of death, weight gain, weight loss, chest pain, feeling of choking, dizziness, malaise/fatigue, nausea and difficulty controlling mood.Symptoms occur constantly.  The severity of symptoms is moderate.  The quality of sleep is good. Awakens seldom during the night. Her past medical history is significant for depression. Past treatments include SSRI. The treatment provides significant relief. Compliance with medications is %. Treatment side effects: no side effects.   Back Pain  This is a new problem. The current episode started 1 to 4 weeks ago. The problem occurs intermittently. The problem is unchanged. The pain is present in the lumbar spine. The pain does not radiate. The pain is moderate. The symptoms are aggravated by bending, twisting, standing and lying down. Pertinent negatives include no abdominal pain, bladder incontinence, bowel incontinence, chest pain, dysuria, fever, headaches, leg pain, numbness, paresis, paresthesias, pelvic pain, perianal numbness, tingling, weakness or weight loss. She has tried NSAIDs, ice and heat for the symptoms. The treatment provided mild relief.                Objective   No Known Allergies    There is  no immunization history on file for this patient.  Past Medical History:   Diagnosis Date    Anxiety     Depression     GERD (gastroesophageal reflux disease)     Gross hematuria     Kidney stone     Muscle spasm     Ureteral stone       Past Surgical History:   Procedure Laterality Date     SECTION      ENDOMETRIAL ABLATION      INGUINAL HERNIA REPAIR      LAPAROSCOPIC TUBAL LIGATION      URETEROSCOPY LASER LITHOTRIPSY WITH STENT INSERTION Right 2023    Procedure: CYSTOSCOPY right URETEROSCOPY  HOLMIUM LASER STENT INSERTION, BILATERAL RETROGRADE;  Surgeon: Samra Mac MD;  Location: [facility] MA[facility] OR;  Service: Urology;  Laterality: Right;      Social History     Socioeconomic History    Marital status:    Tobacco Use    Smoking status: Every Day     Current packs/day: 1.00     Average packs/day: 1 pack/day for 36.4 years (36.4 ttl pk-yrs)     Types: Cigarettes     Start date: 1987    Smokeless tobacco: Never   Vaping Use    Vaping status: Never Used   Substance and Sexual Activity    Alcohol use: Yes     Comment: occasionally    Drug use: Never    Sexual activity: Defer        Current Outpatient Medications:     sertraline (Zoloft) 100 MG tablet, Take 1 tablet by mouth Daily., Disp: 30 tablet, Rfl: 1    cyclobenzaprine (FLEXERIL) 5 MG tablet, Take 1 tablet by mouth 3 (Three) Times a Day As Needed for Muscle Spasms., Disp: 90 tablet, Rfl: 0    diclofenac (VOLTAREN) 50 MG EC tablet, Take 1 tablet by mouth 2 (Two) Times a Day As Needed (pain)., Disp: 60 tablet, Rfl: 0    tamsulosin (FLOMAX) 0.4 MG capsule 24 hr capsule, Take 1 capsule by mouth Daily., Disp: 30 capsule, Rfl: 0    traMADol (ULTRAM) 50 MG tablet, Take 1 tablet by mouth Every 6 (Six) Hours As Needed for Moderate Pain., Disp: 28 tablet, Rfl: 0   Family History   Problem Relation Age of Onset    Hypertension Mother     Depression Mother     Diabetes Mother     Anxiety disorder Mother     Kidney disease Mother     Multiple  sclerosis Sister     No Known Problems Daughter     No Known Problems Son     No Known Problems Son     Arthritis Other     COPD Other     Diabetes type II Other     Malig Hyperthermia Neg Hx           Vital Signs:   Vitals:    04/11/24 1633   BP: 110/70   Pulse: 86   Temp: 97.3 °F (36.3 °C)   SpO2: 98%   Weight: 70.4 kg (155 lb 3.2 oz)       Review of Systems   Constitutional:  Negative for fever, irritability, malaise/fatigue, weight gain and weight loss.   HENT:  Negative for sore throat.    Eyes:  Negative for visual disturbance.   Respiratory:  Negative for cough, chest tightness, shortness of breath and wheezing.    Cardiovascular:  Negative for chest pain, palpitations and leg swelling.   Gastrointestinal:  Negative for abdominal pain, bowel incontinence, diarrhea, nausea and vomiting.   Genitourinary:  Negative for bladder incontinence, dysuria and pelvic pain.   Musculoskeletal:  Positive for back pain.   Neurological:  Negative for dizziness, tingling, weakness, numbness, headaches and paresthesias.   Psychiatric/Behavioral:  Negative for confusion, decreased concentration and suicidal ideas. The patient is not nervous/anxious and does not have insomnia.       Physical Exam  Vitals reviewed.   Constitutional:       Appearance: Normal appearance. She is well-developed.   HENT:      Head: Normocephalic and atraumatic.      Right Ear: External ear normal.      Left Ear: External ear normal.      Mouth/Throat:      Pharynx: No oropharyngeal exudate.   Eyes:      Conjunctiva/sclera: Conjunctivae normal.      Pupils: Pupils are equal, round, and reactive to light.   Cardiovascular:      Rate and Rhythm: Normal rate and regular rhythm.      Pulses: Normal pulses.      Heart sounds: Normal heart sounds. No murmur heard.     No friction rub. No gallop.   Pulmonary:      Effort: Pulmonary effort is normal.      Breath sounds: Normal breath sounds. No wheezing or rhonchi.   Abdominal:      General: Abdomen is flat.  Bowel sounds are normal. There is no distension.      Palpations: Abdomen is soft. There is no mass.      Tenderness: There is no abdominal tenderness. There is no right CVA tenderness, left CVA tenderness, guarding or rebound.      Hernia: No hernia is present.   Musculoskeletal:         General: Normal range of motion.      Comments: Right lower back paraspinal muscle tenderness, no vertebrae tenderness, neg straight leg raise, no redness, no warmth, or swelling, or bruising.   Skin:     General: Skin is warm and dry.      Capillary Refill: Capillary refill takes less than 2 seconds.   Neurological:      General: No focal deficit present.      Mental Status: She is alert and oriented to person, place, and time.      Cranial Nerves: No cranial nerve deficit.   Psychiatric:         Mood and Affect: Mood and affect normal.         Behavior: Behavior normal.         Thought Content: Thought content normal.         Judgment: Judgment normal.        Result Review :   The following data was reviewed by: Andrés Marcial MD on 04/11/2024:  CMP          4/27/2023    14:10 2/29/2024    11:32   CMP   Glucose 122  98    BUN 13  12    Creatinine 1.06  0.78    EGFR 63.7  91.5    Sodium 140  137    Potassium 3.8  3.9    Chloride 104  101    Calcium 10.0  9.8    Total Protein 6.7  6.8    Albumin 4.5  4.5    Globulin 2.2  2.3    Total Bilirubin 0.5  0.5    Alkaline Phosphatase 106  103    AST (SGOT) 15  19    ALT (SGPT) 14  15    Albumin/Globulin Ratio 2.0  2.0    BUN/Creatinine Ratio 12.3  15.4    Anion Gap 11.5  12.0      CBC          4/27/2023    14:10 2/29/2024    11:32   CBC   WBC 9.20  9.71    RBC 4.90  4.59    Hemoglobin 15.8  14.7    Hematocrit 45.7  43.2    MCV 93.3  94.1    MCH 32.2  32.0    MCHC 34.6  34.0    RDW 12.6  12.6    Platelets 324  281      Lipid Panel          2/29/2024    11:32   Lipid Panel   Total Cholesterol 164    Triglycerides 76    HDL Cholesterol 42    VLDL Cholesterol 15    LDL Cholesterol  107     LDL/HDL Ratio 2.54      TSH          2/29/2024    11:32   TSH   TSH 2.060                Assessment and Plan    Diagnoses and all orders for this visit:    1. Acute right-sided low back pain without sciatica (Primary)  -     diclofenac (VOLTAREN) 50 MG EC tablet; Take 1 tablet by mouth 2 (Two) Times a Day As Needed (pain).  Dispense: 60 tablet; Refill: 0  -     cyclobenzaprine (FLEXERIL) 5 MG tablet; Take 1 tablet by mouth 3 (Three) Times a Day As Needed for Muscle Spasms.  Dispense: 90 tablet; Refill: 0  -     POCT urinalysis dipstick, automated  -     CBC Auto Differential  -     Comprehensive Metabolic Panel  -     Magnesium  -     CT Abdomen Pelvis Without Contrast; Future  -     traMADol (ULTRAM) 50 MG tablet; Take 1 tablet by mouth Every 6 (Six) Hours As Needed for Moderate Pain.  Dispense: 28 tablet; Refill: 0    2. Major depressive disorder, single episode, unspecified  -     sertraline (Zoloft) 100 MG tablet; Take 1 tablet by mouth Daily.  Dispense: 30 tablet; Refill: 1    3. Hematuria, unspecified type  -     CT Abdomen Pelvis Without Contrast; Future  -     tamsulosin (FLOMAX) 0.4 MG capsule 24 hr capsule; Take 1 capsule by mouth Daily.  Dispense: 30 capsule; Refill: 0  -     Urine Culture - Urine, Urine, Clean Catch  -     Urinalysis With Microscopic If Indicated (No Culture) - Urine, Clean Catch; Future  -     traMADol (ULTRAM) 50 MG tablet; Take 1 tablet by mouth Every 6 (Six) Hours As Needed for Moderate Pain.  Dispense: 28 tablet; Refill: 0    4. Major depressive disorder with single episode, remission status unspecified            Follow Up   Return in about 2 weeks (around 4/25/2024) for Recheck.  Patient was given instructions and counseling regarding her condition or for health maintenance advice. Please see specific information pulled into the AVS if appropriate.

## 2024-04-12 LAB
ALBUMIN SERPL-MCNC: 4 G/DL (ref 3.5–5.2)
ALBUMIN/GLOB SERPL: 1.5 G/DL
ALP SERPL-CCNC: 91 U/L (ref 39–117)
ALT SERPL W P-5'-P-CCNC: 11 U/L (ref 1–33)
ANION GAP SERPL CALCULATED.3IONS-SCNC: 8.3 MMOL/L (ref 5–15)
AST SERPL-CCNC: 16 U/L (ref 1–32)
BACTERIA UR QL AUTO: ABNORMAL /HPF
BASOPHILS # BLD AUTO: 0.06 10*3/MM3 (ref 0–0.2)
BASOPHILS NFR BLD AUTO: 1 % (ref 0–1.5)
BILIRUB SERPL-MCNC: 0.5 MG/DL (ref 0–1.2)
BILIRUB UR QL STRIP: NEGATIVE
BUN SERPL-MCNC: 9 MG/DL (ref 6–20)
BUN/CREAT SERPL: 12.7 (ref 7–25)
CALCIUM SPEC-SCNC: 9.7 MG/DL (ref 8.6–10.5)
CHLORIDE SERPL-SCNC: 106 MMOL/L (ref 98–107)
CLARITY UR: ABNORMAL
CO2 SERPL-SCNC: 26.7 MMOL/L (ref 22–29)
COD CRY URNS QL: ABNORMAL /HPF
COLOR UR: YELLOW
CREAT SERPL-MCNC: 0.71 MG/DL (ref 0.57–1)
DEPRECATED RDW RBC AUTO: 41.9 FL (ref 37–54)
EGFRCR SERPLBLD CKD-EPI 2021: 102.5 ML/MIN/1.73
EOSINOPHIL # BLD AUTO: 0.2 10*3/MM3 (ref 0–0.4)
EOSINOPHIL NFR BLD AUTO: 3.4 % (ref 0.3–6.2)
ERYTHROCYTE [DISTWIDTH] IN BLOOD BY AUTOMATED COUNT: 12.3 % (ref 12.3–15.4)
GLOBULIN UR ELPH-MCNC: 2.6 GM/DL
GLUCOSE SERPL-MCNC: 103 MG/DL (ref 65–99)
GLUCOSE UR STRIP-MCNC: NEGATIVE MG/DL
HCT VFR BLD AUTO: 43.8 % (ref 34–46.6)
HGB BLD-MCNC: 14 G/DL (ref 12–15.9)
HGB UR QL STRIP.AUTO: ABNORMAL
HYALINE CASTS UR QL AUTO: ABNORMAL /LPF
IMM GRANULOCYTES # BLD AUTO: 0.02 10*3/MM3 (ref 0–0.05)
IMM GRANULOCYTES NFR BLD AUTO: 0.3 % (ref 0–0.5)
KETONES UR QL STRIP: NEGATIVE
LEUKOCYTE ESTERASE UR QL STRIP.AUTO: NEGATIVE
LYMPHOCYTES # BLD AUTO: 2.09 10*3/MM3 (ref 0.7–3.1)
LYMPHOCYTES NFR BLD AUTO: 35.7 % (ref 19.6–45.3)
MAGNESIUM SERPL-MCNC: 2.2 MG/DL (ref 1.6–2.6)
MCH RBC QN AUTO: 30 PG (ref 26.6–33)
MCHC RBC AUTO-ENTMCNC: 32 G/DL (ref 31.5–35.7)
MCV RBC AUTO: 93.8 FL (ref 79–97)
MONOCYTES # BLD AUTO: 0.43 10*3/MM3 (ref 0.1–0.9)
MONOCYTES NFR BLD AUTO: 7.3 % (ref 5–12)
NEUTROPHILS NFR BLD AUTO: 3.06 10*3/MM3 (ref 1.7–7)
NEUTROPHILS NFR BLD AUTO: 52.3 % (ref 42.7–76)
NITRITE UR QL STRIP: NEGATIVE
NRBC BLD AUTO-RTO: 0 /100 WBC (ref 0–0.2)
PH UR STRIP.AUTO: 5.5 [PH] (ref 5–8)
PLATELET # BLD AUTO: 313 10*3/MM3 (ref 140–450)
PMV BLD AUTO: 10.1 FL (ref 6–12)
POTASSIUM SERPL-SCNC: 3.8 MMOL/L (ref 3.5–5.2)
PROT SERPL-MCNC: 6.6 G/DL (ref 6–8.5)
PROT UR QL STRIP: ABNORMAL
RBC # BLD AUTO: 4.67 10*6/MM3 (ref 3.77–5.28)
RBC # UR STRIP: ABNORMAL /HPF
REF LAB TEST METHOD: ABNORMAL
SODIUM SERPL-SCNC: 141 MMOL/L (ref 136–145)
SP GR UR STRIP: >=1.03 (ref 1–1.03)
SQUAMOUS #/AREA URNS HPF: ABNORMAL /HPF
UROBILINOGEN UR QL STRIP: ABNORMAL
WBC # UR STRIP: ABNORMAL /HPF
WBC NRBC COR # BLD AUTO: 5.86 10*3/MM3 (ref 3.4–10.8)

## 2024-04-12 NOTE — PROGRESS NOTES
Labs show no significant changes.  Urine culture shows no unusual bacteria.  Follow-up if not better.

## 2024-04-13 LAB — BACTERIA SPEC AEROBE CULT: NORMAL

## 2024-04-15 ENCOUNTER — HOSPITAL ENCOUNTER (OUTPATIENT)
Dept: CT IMAGING | Facility: HOSPITAL | Age: 54
Discharge: HOME | End: 2024-04-15
Admitting: FAMILY MEDICINE
Payer: COMMERCIAL

## 2024-04-15 DIAGNOSIS — M54.50 ACUTE RIGHT-SIDED LOW BACK PAIN WITHOUT SCIATICA: ICD-10-CM

## 2024-04-15 DIAGNOSIS — R31.9 HEMATURIA, UNSPECIFIED TYPE: ICD-10-CM

## 2024-04-15 DIAGNOSIS — K57.92 DIVERTICULITIS: Primary | ICD-10-CM

## 2024-04-15 PROCEDURE — 74176 CT ABD & PELVIS W/O CONTRAST: CPT

## 2024-04-15 RX ORDER — SULFAMETHOXAZOLE AND TRIMETHOPRIM 800; 160 MG/1; MG/1
1 TABLET ORAL 2 TIMES DAILY
Qty: 14 TABLET | Refills: 0 | Status: SHIPPED | OUTPATIENT
Start: 2024-04-15 | End: 2024-04-22

## 2024-04-15 RX ORDER — METRONIDAZOLE 500 MG/1
500 TABLET ORAL 3 TIMES DAILY
Qty: 21 TABLET | Refills: 0 | Status: SHIPPED | OUTPATIENT
Start: 2024-04-15 | End: 2024-04-22

## 2024-04-15 NOTE — PROGRESS NOTES
Call pt: CT abdomen showed mild diverticulitis, no kidney stone.  For this I sent in flagyl and bactrim to take at your pharmacy.  Follow-up if not better aftr finishing the antibiotic, or sooner if symptoms worsen.

## 2024-05-04 DIAGNOSIS — M54.50 ACUTE RIGHT-SIDED LOW BACK PAIN WITHOUT SCIATICA: ICD-10-CM

## 2024-06-05 DIAGNOSIS — F32.9 MAJOR DEPRESSIVE DISORDER, SINGLE EPISODE, UNSPECIFIED: ICD-10-CM

## 2024-06-05 DIAGNOSIS — M54.50 ACUTE RIGHT-SIDED LOW BACK PAIN WITHOUT SCIATICA: ICD-10-CM

## 2024-06-05 DIAGNOSIS — G43.909 MIGRAINE WITHOUT STATUS MIGRAINOSUS, NOT INTRACTABLE, UNSPECIFIED MIGRAINE TYPE: ICD-10-CM

## 2024-06-05 RX ORDER — SERTRALINE HYDROCHLORIDE 100 MG/1
100 TABLET, FILM COATED ORAL DAILY
Qty: 30 TABLET | Refills: 1 | Status: SHIPPED | OUTPATIENT
Start: 2024-06-05

## 2024-06-05 RX ORDER — SUMATRIPTAN 100 MG/1
TABLET, FILM COATED ORAL
Qty: 30 TABLET | Refills: 1 | Status: SHIPPED | OUTPATIENT
Start: 2024-06-05

## 2024-06-28 DIAGNOSIS — F32.9 MAJOR DEPRESSIVE DISORDER, SINGLE EPISODE, UNSPECIFIED: ICD-10-CM

## 2024-06-28 RX ORDER — SERTRALINE HYDROCHLORIDE 100 MG/1
100 TABLET, FILM COATED ORAL DAILY
Qty: 30 TABLET | Refills: 1 | Status: SHIPPED | OUTPATIENT
Start: 2024-06-28

## 2024-06-28 NOTE — TELEPHONE ENCOUNTER
Caller: Rosie Medrano Mitchell    Relationship: Self    Best call back number: [phone]     Requested Prescriptions:   Requested Prescriptions     Pending Prescriptions Disp Refills    sertraline (ZOLOFT) 100 MG tablet 30 tablet 1     Sig: Take 1 tablet by mouth Daily.        Pharmacy where request should be sent: [address] - [phone] [facility] [phone] [facility]     Last office visit with prescribing clinician: 4/11/2024   Last telemedicine visit with prescribing clinician: Visit date not found   Next office visit with prescribing clinician: Visit date not found     Additional details provided by patient: PATIENT IS CHANGING INSURANCES AND WILL NOT HAVE ANY REFILLS LEFT. CAN YOU PLEASE CALL IN ANOTHER REFILL NOW SO SHE CAN PICK IT UP ON CURRENT INSURANCE.     Does the patient have less than a 3 day supply:  [] Yes  [x] No    Would you like a call back once the refill request has been completed: [] Yes [] No    If the office needs to give you a call back, can they leave a voicemail: [x] Yes [] No    Renny Barnhart Rep   06/28/24 13:28 EDT         RSOA YES OR CALL

## 2024-07-21 DIAGNOSIS — F32.9 MAJOR DEPRESSIVE DISORDER, SINGLE EPISODE, UNSPECIFIED: ICD-10-CM

## 2024-07-21 DIAGNOSIS — M54.50 ACUTE RIGHT-SIDED LOW BACK PAIN WITHOUT SCIATICA: ICD-10-CM

## 2024-07-22 RX ORDER — SERTRALINE HYDROCHLORIDE 100 MG/1
100 TABLET, FILM COATED ORAL DAILY
Qty: 30 TABLET | Refills: 1 | Status: SHIPPED | OUTPATIENT
Start: 2024-07-22

## 2024-08-06 ENCOUNTER — OFFICE VISIT (OUTPATIENT)
Dept: FAMILY MEDICINE CLINIC | Facility: CLINIC | Age: 54
End: 2024-08-06
Payer: COMMERCIAL

## 2024-08-06 VITALS
TEMPERATURE: 97.7 F | WEIGHT: 159.4 LBS | OXYGEN SATURATION: 96 % | HEART RATE: 105 BPM | BODY MASS INDEX: 25.74 KG/M2 | SYSTOLIC BLOOD PRESSURE: 130 MMHG | DIASTOLIC BLOOD PRESSURE: 85 MMHG

## 2024-08-06 DIAGNOSIS — R73.09 ELEVATED RANDOM BLOOD GLUCOSE LEVEL: ICD-10-CM

## 2024-08-06 DIAGNOSIS — M54.50 ACUTE RIGHT-SIDED LOW BACK PAIN WITHOUT SCIATICA: ICD-10-CM

## 2024-08-06 DIAGNOSIS — I10 PRIMARY HYPERTENSION: ICD-10-CM

## 2024-08-06 DIAGNOSIS — Z12.31 ENCOUNTER FOR SCREENING MAMMOGRAM FOR MALIGNANT NEOPLASM OF BREAST: ICD-10-CM

## 2024-08-06 DIAGNOSIS — Z23 NEED FOR TDAP VACCINATION: ICD-10-CM

## 2024-08-06 DIAGNOSIS — Z12.11 COLON CANCER SCREENING: ICD-10-CM

## 2024-08-06 DIAGNOSIS — E78.2 MIXED HYPERLIPIDEMIA: Primary | ICD-10-CM

## 2024-08-06 DIAGNOSIS — Z12.2 SCREENING FOR LUNG CANCER: ICD-10-CM

## 2024-08-06 DIAGNOSIS — F32.9 MAJOR DEPRESSIVE DISORDER, SINGLE EPISODE, UNSPECIFIED: ICD-10-CM

## 2024-08-06 LAB
ALBUMIN SERPL-MCNC: 4.5 G/DL (ref 3.5–5.2)
ALBUMIN/GLOB SERPL: 2.1 G/DL
ALP SERPL-CCNC: 113 U/L (ref 39–117)
ALT SERPL W P-5'-P-CCNC: 22 U/L (ref 1–33)
ANION GAP SERPL CALCULATED.3IONS-SCNC: 10 MMOL/L (ref 5–15)
AST SERPL-CCNC: 25 U/L (ref 1–32)
BASOPHILS # BLD AUTO: 0.08 10*3/MM3 (ref 0–0.2)
BASOPHILS NFR BLD AUTO: 1.1 % (ref 0–1.5)
BILIRUB SERPL-MCNC: 0.4 MG/DL (ref 0–1.2)
BUN SERPL-MCNC: 10 MG/DL (ref 6–20)
BUN/CREAT SERPL: 14.5 (ref 7–25)
CALCIUM SPEC-SCNC: 9.5 MG/DL (ref 8.6–10.5)
CHLORIDE SERPL-SCNC: 103 MMOL/L (ref 98–107)
CHOLEST SERPL-MCNC: 191 MG/DL (ref 0–200)
CO2 SERPL-SCNC: 23 MMOL/L (ref 22–29)
CREAT SERPL-MCNC: 0.69 MG/DL (ref 0.57–1)
DEPRECATED RDW RBC AUTO: 45.7 FL (ref 37–54)
EGFRCR SERPLBLD CKD-EPI 2021: 103.9 ML/MIN/1.73
EOSINOPHIL # BLD AUTO: 0.13 10*3/MM3 (ref 0–0.4)
EOSINOPHIL NFR BLD AUTO: 1.8 % (ref 0.3–6.2)
ERYTHROCYTE [DISTWIDTH] IN BLOOD BY AUTOMATED COUNT: 13 % (ref 12.3–15.4)
GLOBULIN UR ELPH-MCNC: 2.1 GM/DL
GLUCOSE SERPL-MCNC: 104 MG/DL (ref 65–99)
HBA1C MFR BLD: 4.9 % (ref 4.8–5.6)
HCT VFR BLD AUTO: 46.2 % (ref 34–46.6)
HDLC SERPL-MCNC: 39 MG/DL (ref 40–60)
HGB BLD-MCNC: 15.5 G/DL (ref 12–15.9)
IMM GRANULOCYTES # BLD AUTO: 0.03 10*3/MM3 (ref 0–0.05)
IMM GRANULOCYTES NFR BLD AUTO: 0.4 % (ref 0–0.5)
LDLC SERPL CALC-MCNC: 130 MG/DL (ref 0–100)
LDLC/HDLC SERPL: 3.27 {RATIO}
LYMPHOCYTES # BLD AUTO: 2.76 10*3/MM3 (ref 0.7–3.1)
LYMPHOCYTES NFR BLD AUTO: 38.5 % (ref 19.6–45.3)
MCH RBC QN AUTO: 32.2 PG (ref 26.6–33)
MCHC RBC AUTO-ENTMCNC: 33.5 G/DL (ref 31.5–35.7)
MCV RBC AUTO: 96 FL (ref 79–97)
MONOCYTES # BLD AUTO: 0.39 10*3/MM3 (ref 0.1–0.9)
MONOCYTES NFR BLD AUTO: 5.4 % (ref 5–12)
NEUTROPHILS NFR BLD AUTO: 3.77 10*3/MM3 (ref 1.7–7)
NEUTROPHILS NFR BLD AUTO: 52.8 % (ref 42.7–76)
NRBC BLD AUTO-RTO: 0 /100 WBC (ref 0–0.2)
PLATELET # BLD AUTO: 303 10*3/MM3 (ref 140–450)
PMV BLD AUTO: 9.9 FL (ref 6–12)
POTASSIUM SERPL-SCNC: 4 MMOL/L (ref 3.5–5.2)
PROT SERPL-MCNC: 6.6 G/DL (ref 6–8.5)
RBC # BLD AUTO: 4.81 10*6/MM3 (ref 3.77–5.28)
SODIUM SERPL-SCNC: 136 MMOL/L (ref 136–145)
T4 FREE SERPL-MCNC: 1.38 NG/DL (ref 0.92–1.68)
TRIGL SERPL-MCNC: 123 MG/DL (ref 0–150)
TSH SERPL DL<=0.05 MIU/L-ACNC: 1.09 UIU/ML (ref 0.27–4.2)
VLDLC SERPL-MCNC: 22 MG/DL (ref 5–40)
WBC NRBC COR # BLD AUTO: 7.16 10*3/MM3 (ref 3.4–10.8)

## 2024-08-06 PROCEDURE — 90471 IMMUNIZATION ADMIN: CPT | Performed by: FAMILY MEDICINE

## 2024-08-06 PROCEDURE — 80061 LIPID PANEL: CPT | Performed by: FAMILY MEDICINE

## 2024-08-06 PROCEDURE — 83036 HEMOGLOBIN GLYCOSYLATED A1C: CPT | Performed by: FAMILY MEDICINE

## 2024-08-06 PROCEDURE — 90715 TDAP VACCINE 7 YRS/> IM: CPT | Performed by: FAMILY MEDICINE

## 2024-08-06 PROCEDURE — 80050 GENERAL HEALTH PANEL: CPT | Performed by: FAMILY MEDICINE

## 2024-08-06 PROCEDURE — 84439 ASSAY OF FREE THYROXINE: CPT | Performed by: FAMILY MEDICINE

## 2024-08-06 PROCEDURE — 99214 OFFICE O/P EST MOD 30 MIN: CPT | Performed by: FAMILY MEDICINE

## 2024-08-06 RX ORDER — CYCLOBENZAPRINE HCL 5 MG
5 TABLET ORAL 3 TIMES DAILY PRN
Qty: 90 TABLET | Refills: 1 | Status: SHIPPED | OUTPATIENT
Start: 2024-08-06

## 2024-08-06 RX ORDER — IBUPROFEN 800 MG/1
800 TABLET ORAL AS NEEDED
COMMUNITY
Start: 2024-07-06

## 2024-08-06 RX ORDER — SERTRALINE HYDROCHLORIDE 100 MG/1
100 TABLET, FILM COATED ORAL DAILY
Qty: 90 TABLET | Refills: 1 | Status: SHIPPED | OUTPATIENT
Start: 2024-08-06

## 2024-08-06 RX ORDER — QUETIAPINE 200 MG/1
200 TABLET, FILM COATED, EXTENDED RELEASE ORAL NIGHTLY
Qty: 90 TABLET | Refills: 1 | Status: SHIPPED | OUTPATIENT
Start: 2024-08-06

## 2024-08-06 NOTE — PROGRESS NOTES
Chief Complaint  Med Refill, Hypertension, Hyperlipidemia, and Depression    Subjective          Rosie Mderano presents to [facility]  History of Present Illness  Pt wants cologard- she understands that cologard misses up to 6-8% of colon cancers that colonoscopy will  on- pt still wants to do cologard- pt understands that she needs colonoscopy if it is positive    Pt still has depression- no SI or HI  Depression  Presents for follow-up visit. Symptoms include depressed mood. Patient is not experiencing: compulsions, confusion, decreased concentration, dry mouth, excessive worry, feelings of hopelessness, feelings of worthlessness, hypersomnia, hyperventilation, insomnia, irritability, muscle tension, nervousness/anxiety, obsessions, palpitations, panic, psychomotor agitation, psychomotor retardation, shortness of breath, suicidal ideas, suicidal planning, thoughts of death, weight gain, weight loss, chest pain, feeling of choking, dizziness, malaise/fatigue, nausea and difficulty controlling mood.Symptoms occur constantly.  The severity of symptoms is moderate.  The quality of sleep is good. Awakens seldom during the night. Her past medical history is significant for depression. Past treatments include SSRI. The treatment provides mild relief. Compliance with medications is %. Treatment side effects: no side effects.   Hypertension  This is a chronic problem. The current episode started more than 1 year ago. The problem has been improved since onset. The problem is controlled. Pertinent negatives include no anxiety, blurred vision, chest pain, headaches, malaise/fatigue, neck pain, orthopnea, palpitations, peripheral edema, PND, shortness of breath or sweats. There are no associated agents to hypertension. Risk factors for coronary artery disease include dyslipidemia, family history and post-menopausal state. Current antihypertension treatment includes lifestyle  changes. The current treatment provides significant improvement. There are no compliance problems.    Hyperlipidemia  This is a chronic problem. The current episode started more than 1 year ago. The problem is controlled. Recent lipid tests were reviewed and are variable. There are no known factors aggravating her hyperlipidemia. Pertinent negatives include no chest pain, focal sensory loss, focal weakness, leg pain, myalgias or shortness of breath. Current antihyperlipidemic treatment includes diet change. The current treatment provides significant improvement of lipids. There are no compliance problems.                 Objective   No Known Allergies  Immunization History   Administered Date(s) Administered    Tdap 2024     Past Medical History:   Diagnosis Date    Anxiety     Depression     GERD (gastroesophageal reflux disease)     Gross hematuria     Kidney stone     Muscle spasm     Ureteral stone       Past Surgical History:   Procedure Laterality Date     SECTION      ENDOMETRIAL ABLATION      INGUINAL HERNIA REPAIR      LAPAROSCOPIC TUBAL LIGATION      URETEROSCOPY LASER LITHOTRIPSY WITH STENT INSERTION Right 2023    Procedure: CYSTOSCOPY right URETEROSCOPY  HOLMIUM LASER STENT INSERTION, BILATERAL RETROGRADE;  Surgeon: Samra Mac MD;  Location: [facility];  Service: Urology;  Laterality: Right;      Social History     Socioeconomic History    Marital status:    Tobacco Use    Smoking status: Every Day     Current packs/day: 1.00     Average packs/day: 1 pack/day for 36.7 years (36.7 ttl pk-yrs)     Types: Cigarettes     Start date: 1987    Smokeless tobacco: Never   Vaping Use    Vaping status: Never Used   Substance and Sexual Activity    Alcohol use: Yes     Comment: occasionally    Drug use: Never    Sexual activity: Defer        Current Outpatient Medications:     cyclobenzaprine (FLEXERIL) 5 MG tablet, Take 1 tablet by mouth 3 (Three) Times a Day As Needed  for Muscle Spasms., Disp: 90 tablet, Rfl: 1    ibuprofen (ADVIL,MOTRIN) 800 MG tablet, Take 1 tablet by mouth As Needed., Disp: , Rfl:     sertraline (ZOLOFT) 100 MG tablet, Take 1 tablet by mouth Daily., Disp: 90 tablet, Rfl: 1    QUEtiapine XR (SEROquel XR) 200 MG 24 hr tablet, Take 1 tablet by mouth Every Night., Disp: 90 tablet, Rfl: 1   Family History   Problem Relation Age of Onset    Hypertension Mother     Depression Mother     Diabetes Mother     Anxiety disorder Mother     Kidney disease Mother     Multiple sclerosis Sister     No Known Problems Daughter     No Known Problems Son     No Known Problems Son     Arthritis Other     COPD Other     Diabetes type II Other     Malig Hyperthermia Neg Hx           Vital Signs:   Vitals:    08/06/24 1545 08/06/24 1612   BP: 130/90 130/85   Pulse: 105    Temp: 97.7 °F (36.5 °C)    SpO2: 96%    Weight: 72.3 kg (159 lb 6.4 oz)        Review of Systems   Constitutional:  Negative for fatigue, fever, irritability, malaise/fatigue, weight gain and weight loss.   Eyes:  Negative for blurred vision.   Respiratory:  Negative for shortness of breath.    Cardiovascular:  Negative for chest pain, palpitations, orthopnea, leg swelling and PND.   Gastrointestinal:  Negative for abdominal pain, diarrhea, nausea and vomiting.   Musculoskeletal:  Negative for myalgias and neck pain.   Neurological:  Negative for dizziness, focal weakness, light-headedness and headaches.   Psychiatric/Behavioral:  Negative for confusion, decreased concentration and suicidal ideas. The patient is not nervous/anxious and does not have insomnia.       Physical Exam  Vitals reviewed.   Constitutional:       Appearance: Normal appearance. She is well-developed.   HENT:      Head: Normocephalic and atraumatic.      Right Ear: External ear normal.      Left Ear: External ear normal.      Mouth/Throat:      Pharynx: No oropharyngeal exudate.   Eyes:      Conjunctiva/sclera: Conjunctivae normal.      Pupils:  Pupils are equal, round, and reactive to light.   Cardiovascular:      Rate and Rhythm: Normal rate and regular rhythm.      Pulses: Normal pulses.      Heart sounds: Normal heart sounds. No murmur heard.     No friction rub. No gallop.   Pulmonary:      Effort: Pulmonary effort is normal.      Breath sounds: Normal breath sounds. No wheezing or rhonchi.   Abdominal:      General: Abdomen is flat. Bowel sounds are normal. There is no distension.      Palpations: Abdomen is soft. There is no mass.      Tenderness: There is no abdominal tenderness. There is no guarding or rebound.      Hernia: No hernia is present.   Musculoskeletal:         General: Normal range of motion.   Skin:     General: Skin is warm and dry.      Capillary Refill: Capillary refill takes less than 2 seconds.   Neurological:      General: No focal deficit present.      Mental Status: She is alert and oriented to person, place, and time.      Cranial Nerves: No cranial nerve deficit.   Psychiatric:         Mood and Affect: Mood and affect normal.         Behavior: Behavior normal.         Thought Content: Thought content normal.         Judgment: Judgment normal.        Result Review :                 Assessment and Plan    Diagnoses and all orders for this visit:    1. Mixed hyperlipidemia (Primary)  -     Lipid Panel    2. Acute right-sided low back pain without sciatica  -     cyclobenzaprine (FLEXERIL) 5 MG tablet; Take 1 tablet by mouth 3 (Three) Times a Day As Needed for Muscle Spasms.  Dispense: 90 tablet; Refill: 1    3. Major depressive disorder, single episode, unspecified  -     sertraline (ZOLOFT) 100 MG tablet; Take 1 tablet by mouth Daily.  Dispense: 90 tablet; Refill: 1  -     QUEtiapine XR (SEROquel XR) 200 MG 24 hr tablet; Take 1 tablet by mouth Every Night.  Dispense: 90 tablet; Refill: 1    4. Primary hypertension  -     CBC Auto Differential  -     Comprehensive Metabolic Panel  -     TSH+Free T4    5. Elevated random blood  glucose level  -     Hemoglobin A1c    6. Screening for lung cancer  -      CT Chest Low Dose Cancer Screening WO; Future    7. Colon cancer screening  -     Cologuard - Stool, Per Rectum; Future    8. Need for Tdap vaccination  -     Tdap Vaccine => 6yo IM (BOOSTRIX/ADACEL)    9. Encounter for screening mammogram for malignant neoplasm of breast  -     Mammo Screening Digital Tomosynthesis Bilateral With CAD; Future            Follow Up   Return in about 1 week (around 8/13/2024) for needs well woman exam with female provider.  Patient was given instructions and counseling regarding her condition or for health maintenance advice. Please see specific information pulled into the AVS if appropriate.

## 2024-08-06 NOTE — PROGRESS NOTES
Venipuncture Blood Specimen Collection  Venipuncture performed in left arm by Teresa Gomez with good hemostasis. Patient tolerated the procedure well without complications.   08/06/24   Teresa Gomez

## 2024-08-25 DIAGNOSIS — M19.90 UNSPECIFIED OSTEOARTHRITIS, UNSPECIFIED SITE: ICD-10-CM

## 2024-08-26 RX ORDER — IBUPROFEN 800 MG/1
TABLET, FILM COATED ORAL
Qty: 30 TABLET | Refills: 5 | Status: SHIPPED | OUTPATIENT
Start: 2024-08-26

## 2024-10-02 DIAGNOSIS — M54.50 ACUTE RIGHT-SIDED LOW BACK PAIN WITHOUT SCIATICA: ICD-10-CM

## 2024-12-12 DIAGNOSIS — G43.909 MIGRAINE WITHOUT STATUS MIGRAINOSUS, NOT INTRACTABLE, UNSPECIFIED MIGRAINE TYPE: ICD-10-CM

## 2024-12-12 DIAGNOSIS — M54.50 ACUTE RIGHT-SIDED LOW BACK PAIN WITHOUT SCIATICA: ICD-10-CM

## 2024-12-13 RX ORDER — SUMATRIPTAN SUCCINATE 100 MG/1
TABLET ORAL
Qty: 30 TABLET | Refills: 1 | OUTPATIENT
Start: 2024-12-13

## 2024-12-19 ENCOUNTER — OFFICE VISIT (OUTPATIENT)
Dept: FAMILY MEDICINE CLINIC | Facility: CLINIC | Age: 54
End: 2024-12-19
Payer: COMMERCIAL

## 2024-12-19 VITALS
WEIGHT: 151.7 LBS | SYSTOLIC BLOOD PRESSURE: 130 MMHG | TEMPERATURE: 97.7 F | DIASTOLIC BLOOD PRESSURE: 80 MMHG | HEART RATE: 76 BPM | BODY MASS INDEX: 24.5 KG/M2

## 2024-12-19 DIAGNOSIS — M54.50 ACUTE RIGHT-SIDED LOW BACK PAIN WITHOUT SCIATICA: ICD-10-CM

## 2024-12-19 DIAGNOSIS — Z12.2 SCREENING FOR LUNG CANCER: Primary | ICD-10-CM

## 2024-12-19 DIAGNOSIS — Z12.31 ENCOUNTER FOR SCREENING MAMMOGRAM FOR MALIGNANT NEOPLASM OF BREAST: ICD-10-CM

## 2024-12-19 DIAGNOSIS — F32.9 MAJOR DEPRESSIVE DISORDER, SINGLE EPISODE, UNSPECIFIED: ICD-10-CM

## 2024-12-19 PROCEDURE — 99214 OFFICE O/P EST MOD 30 MIN: CPT | Performed by: FAMILY MEDICINE

## 2024-12-19 RX ORDER — CYCLOBENZAPRINE HCL 5 MG
5 TABLET ORAL 3 TIMES DAILY PRN
Qty: 270 TABLET | Refills: 1 | Status: SHIPPED | OUTPATIENT
Start: 2024-12-19

## 2024-12-19 RX ORDER — IBUPROFEN 800 MG/1
800 TABLET, FILM COATED ORAL
COMMUNITY
Start: 2024-11-14

## 2024-12-19 RX ORDER — SERTRALINE HYDROCHLORIDE 100 MG/1
100 TABLET, FILM COATED ORAL DAILY
Qty: 90 TABLET | Refills: 1 | Status: SHIPPED | OUTPATIENT
Start: 2024-12-19

## 2024-12-19 RX ORDER — QUETIAPINE 200 MG/1
200 TABLET, FILM COATED, EXTENDED RELEASE ORAL NIGHTLY
Qty: 90 TABLET | Refills: 1 | Status: SHIPPED | OUTPATIENT
Start: 2024-12-19

## 2024-12-19 NOTE — PROGRESS NOTES
Chief Complaint  Hyperlipidemia, Depression, and Hypertension    Subjective          Rosie Medrano presents to [facility]  History of Present Illness  Pt urged to quit smoking- pt says she is not ready- she is weaning down but not ready to stop  Hypertension  This is a chronic problem. The current episode started more than 1 year ago. The problem has been improved since onset. The problem is controlled. Pertinent negatives include no anxiety, blurred vision, chest pain, headaches, malaise/fatigue, neck pain, orthopnea, palpitations, peripheral edema, PND, shortness of breath or sweats. There are no associated agents to hypertension. Risk factors for coronary artery disease include dyslipidemia, family history and post-menopausal state. Current antihypertension treatment includes lifestyle changes. The current treatment provides significant improvement. There are no compliance problems.    Hyperlipidemia  This is a chronic problem. The current episode started more than 1 year ago. The problem is controlled. Recent lipid tests were reviewed and are variable. There are no known factors aggravating her hyperlipidemia. Pertinent negatives include no chest pain, focal sensory loss, focal weakness, leg pain, myalgias or shortness of breath. Current antihyperlipidemic treatment includes diet change and exercise. The current treatment provides significant improvement of lipids. There are no compliance problems.    Depression  Presents for follow-up visit. Patient is not experiencing: compulsions, confusion, decreased concentration, depressed mood, dry mouth, excessive worry, feelings of hopelessness, feelings of worthlessness, hypersomnia, hyperventilation, insomnia, irritability, muscle tension, nervousness/anxiety, obsessions, palpitations, panic, psychomotor agitation, psychomotor retardation, shortness of breath, suicidal ideas, suicidal planning, thoughts of death, weight gain,  weight loss, chest pain, feeling of choking, dizziness, malaise/fatigue, nausea and difficulty controlling mood.Symptoms occur occasionally.  The severity of symptoms is moderate.  The quality of sleep is good. Awakens seldom during the night. Her past medical history is significant for depression. Past treatments include SSRI. The treatment provides significant relief. Compliance with medications is %. Treatment side effects: no side effects.     The 10-year ASCVD risk score (Santos DK, et al., 2019) is: 5.9%    Values used to calculate the score:      Age: 54 years      Sex: Female      Is Non- : No      Diabetic: No      Tobacco smoker: Yes      Systolic Blood Pressure: 130 mmHg      Is BP treated: No      HDL Cholesterol: 39 mg/dL      Total Cholesterol: 191 mg/dL    BMI is within normal parameters. No other follow-up for BMI required.       Objective   No Known Allergies  Immunization History   Administered Date(s) Administered    Tdap 2024     Past Medical History:   Diagnosis Date    Anxiety     Depression     GERD (gastroesophageal reflux disease)     Gross hematuria     Kidney stone     Muscle spasm     Ureteral stone       Past Surgical History:   Procedure Laterality Date     SECTION      ENDOMETRIAL ABLATION      INGUINAL HERNIA REPAIR      LAPAROSCOPIC TUBAL LIGATION      URETEROSCOPY LASER LITHOTRIPSY WITH STENT INSERTION Right 2023    Procedure: CYSTOSCOPY right URETEROSCOPY  HOLMIUM LASER STENT INSERTION, BILATERAL RETROGRADE;  Surgeon: Samra Mac MD;  Location: [facility];  Service: Urology;  Laterality: Right;      Social History     Socioeconomic History    Marital status:    Tobacco Use    Smoking status: Every Day     Current packs/day: 1.00     Average packs/day: 1 pack/day for 37.1 years (37.1 ttl pk-yrs)     Types: Cigarettes     Start date: 1987    Smokeless tobacco: Never   Vaping Use    Vaping status: Never Used    Substance and Sexual Activity    Alcohol use: Yes     Comment: occasionally    Drug use: Never    Sexual activity: Defer        Current Outpatient Medications:     cyclobenzaprine (FLEXERIL) 5 MG tablet, Take 1 tablet by mouth 3 (Three) Times a Day As Needed for Muscle Spasms., Disp: 270 tablet, Rfl: 1    diclofenac (VOLTAREN) 50 MG EC tablet, Take 1 tablet by mouth 2 (Two) Times a Day As Needed (pain). for pain, Disp: 180 tablet, Rfl: 1    ibuprofen (ADVIL,MOTRIN) 800 MG tablet, Take 1 tablet by mouth., Disp: , Rfl:     QUEtiapine XR (SEROquel XR) 200 MG 24 hr tablet, Take 1 tablet by mouth Every Night., Disp: 90 tablet, Rfl: 1    sertraline (ZOLOFT) 100 MG tablet, Take 1 tablet by mouth Daily., Disp: 90 tablet, Rfl: 1   Family History   Problem Relation Age of Onset    Hypertension Mother     Depression Mother     Diabetes Mother     Anxiety disorder Mother     Kidney disease Mother     Multiple sclerosis Sister     No Known Problems Daughter     No Known Problems Son     No Known Problems Son     Arthritis Other     COPD Other     Diabetes type II Other     Malig Hyperthermia Neg Hx           Vital Signs:   Vitals:    12/19/24 1359   BP: 130/80   Pulse: 76   Temp: 97.7 °F (36.5 °C)   Weight: 68.8 kg (151 lb 11.2 oz)   PF: 95 L/min       Review of Systems   Constitutional:  Negative for fatigue, fever, irritability, malaise/fatigue, weight gain and weight loss.   HENT:  Negative for sore throat.    Eyes:  Negative for blurred vision and visual disturbance.   Respiratory:  Negative for apnea, cough, chest tightness, shortness of breath and wheezing.    Cardiovascular:  Negative for chest pain, palpitations, orthopnea, leg swelling and PND.   Gastrointestinal:  Negative for abdominal pain, diarrhea, nausea and vomiting.   Musculoskeletal:  Negative for myalgias and neck pain.   Neurological:  Negative for dizziness, focal weakness, light-headedness and headaches.   Psychiatric/Behavioral:  Negative for confusion,  decreased concentration and suicidal ideas. The patient is not nervous/anxious and does not have insomnia.       Physical Exam  Vitals reviewed.   Constitutional:       Appearance: Normal appearance. She is well-developed.   HENT:      Head: Normocephalic and atraumatic.      Right Ear: External ear normal.      Left Ear: External ear normal.      Mouth/Throat:      Pharynx: No oropharyngeal exudate.   Eyes:      Conjunctiva/sclera: Conjunctivae normal.      Pupils: Pupils are equal, round, and reactive to light.   Cardiovascular:      Rate and Rhythm: Normal rate and regular rhythm.      Pulses: Normal pulses.      Heart sounds: Normal heart sounds. No murmur heard.     No friction rub. No gallop.   Pulmonary:      Effort: Pulmonary effort is normal.      Breath sounds: Normal breath sounds. No wheezing or rhonchi.   Abdominal:      General: Abdomen is flat. Bowel sounds are normal. There is no distension.      Palpations: Abdomen is soft. There is no mass.      Tenderness: There is no abdominal tenderness. There is no guarding or rebound.      Hernia: No hernia is present.   Musculoskeletal:         General: Normal range of motion.   Skin:     General: Skin is warm and dry.      Capillary Refill: Capillary refill takes less than 2 seconds.   Neurological:      General: No focal deficit present.      Mental Status: She is alert and oriented to person, place, and time.      Cranial Nerves: No cranial nerve deficit.   Psychiatric:         Mood and Affect: Mood and affect normal.         Behavior: Behavior normal.         Thought Content: Thought content normal.         Judgment: Judgment normal.        Result Review :   The following data was reviewed by: Andrés Marcial MD on 12/19/2024:  CMP          2/29/2024    11:32 4/11/2024    17:02 8/6/2024    16:19   CMP   Glucose 98  103  104    BUN 12  9  10    Creatinine 0.78  0.71  0.69    EGFR 91.5  102.5  103.9    Sodium 137  141  136    Potassium 3.9  3.8  4.0     Chloride 101  106  103    Calcium 9.8  9.7  9.5    Total Protein 6.8  6.6  6.6    Albumin 4.5  4.0  4.5    Globulin 2.3  2.6  2.1    Total Bilirubin 0.5  0.5  0.4    Alkaline Phosphatase 103  91  113    AST (SGOT) 19  16  25    ALT (SGPT) 15  11  22    Albumin/Globulin Ratio 2.0  1.5  2.1    BUN/Creatinine Ratio 15.4  12.7  14.5    Anion Gap 12.0  8.3  10.0      CBC          2/29/2024    11:32 4/11/2024    17:02 8/6/2024    16:19   CBC   WBC 9.71  5.86  7.16    RBC 4.59  4.67  4.81    Hemoglobin 14.7  14.0  15.5    Hematocrit 43.2  43.8  46.2    MCV 94.1  93.8  96.0    MCH 32.0  30.0  32.2    MCHC 34.0  32.0  33.5    RDW 12.6  12.3  13.0    Platelets 281  313  303      Lipid Panel          2/29/2024    11:32 8/6/2024    16:19   Lipid Panel   Total Cholesterol 164  191    Triglycerides 76  123    HDL Cholesterol 42  39    VLDL Cholesterol 15  22    LDL Cholesterol  107  130    LDL/HDL Ratio 2.54  3.27      TSH          2/29/2024    11:32 8/6/2024    16:19   TSH   TSH 2.060  1.090      Most Recent A1C          8/6/2024    16:19   HGBA1C Most Recent   Hemoglobin A1C 4.90                Assessment and Plan    Diagnoses and all orders for this visit:    1. Screening for lung cancer (Primary)  -      CT Chest Low Dose Cancer Screening WO; Future    2. Encounter for screening mammogram for malignant neoplasm of breast  -     Mammo Screening Digital Tomosynthesis Bilateral With CAD; Future    3. Acute right-sided low back pain without sciatica  -     cyclobenzaprine (FLEXERIL) 5 MG tablet; Take 1 tablet by mouth 3 (Three) Times a Day As Needed for Muscle Spasms.  Dispense: 270 tablet; Refill: 1  -     diclofenac (VOLTAREN) 50 MG EC tablet; Take 1 tablet by mouth 2 (Two) Times a Day As Needed (pain). for pain  Dispense: 180 tablet; Refill: 1    4. Major depressive disorder, single episode, unspecified  -     QUEtiapine XR (SEROquel XR) 200 MG 24 hr tablet; Take 1 tablet by mouth Every Night.  Dispense: 90 tablet; Refill:  1  -     sertraline (ZOLOFT) 100 MG tablet; Take 1 tablet by mouth Daily.  Dispense: 90 tablet; Refill: 1            Follow Up   Return in about 6 months (around 6/19/2025) for Recheck.  Patient was given instructions and counseling regarding her condition or for health maintenance advice. Please see specific information pulled into the AVS if appropriate.

## 2025-01-16 ENCOUNTER — OFFICE VISIT (OUTPATIENT)
Dept: FAMILY MEDICINE CLINIC | Facility: CLINIC | Age: 55
End: 2025-01-16
Payer: OTHER MISCELLANEOUS

## 2025-01-16 VITALS
WEIGHT: 152.9 LBS | DIASTOLIC BLOOD PRESSURE: 85 MMHG | BODY MASS INDEX: 24.69 KG/M2 | TEMPERATURE: 97.8 F | HEART RATE: 92 BPM | OXYGEN SATURATION: 98 % | SYSTOLIC BLOOD PRESSURE: 135 MMHG

## 2025-01-16 DIAGNOSIS — M79.642 LEFT HAND PAIN: Primary | ICD-10-CM

## 2025-01-16 PROCEDURE — 99213 OFFICE O/P EST LOW 20 MIN: CPT | Performed by: FAMILY MEDICINE

## 2025-01-16 NOTE — PROGRESS NOTES
Chief Complaint  work comp (Fell on ice 25)    Subjective          Rosie Medrano presents to [facility]  History of Present Illness  Pt fell this AM in post office parking lot on ice and injured left hand on fall- pt did not fall on outstretched hand but hand went behind her and hit ground- no LOC or head injury      BMI is within normal parameters. No other follow-up for BMI required.       Objective   No Known Allergies  Immunization History   Administered Date(s) Administered    Tdap 2024     Past Medical History:   Diagnosis Date    Anxiety     Depression     GERD (gastroesophageal reflux disease)     Gross hematuria     Kidney stone     Muscle spasm     Ureteral stone       Past Surgical History:   Procedure Laterality Date     SECTION      ENDOMETRIAL ABLATION      INGUINAL HERNIA REPAIR      LAPAROSCOPIC TUBAL LIGATION      URETEROSCOPY LASER LITHOTRIPSY WITH STENT INSERTION Right 2023    Procedure: CYSTOSCOPY right URETEROSCOPY  HOLMIUM LASER STENT INSERTION, BILATERAL RETROGRADE;  Surgeon: Samra Mac MD;  Location: [facility] OR;  Service: Urology;  Laterality: Right;      Social History     Socioeconomic History    Marital status:    Tobacco Use    Smoking status: Every Day     Current packs/day: 1.00     Average packs/day: 1 pack/day for 37.2 years (37.2 ttl pk-yrs)     Types: Cigarettes     Start date: 1987    Smokeless tobacco: Never   Vaping Use    Vaping status: Never Used   Substance and Sexual Activity    Alcohol use: Yes     Comment: occasionally    Drug use: Never    Sexual activity: Defer        Current Outpatient Medications:     cyclobenzaprine (FLEXERIL) 5 MG tablet, Take 1 tablet by mouth 3 (Three) Times a Day As Needed for Muscle Spasms., Disp: 270 tablet, Rfl: 1    diclofenac (VOLTAREN) 50 MG EC tablet, Take 1 tablet by mouth 2 (Two) Times a Day As Needed (pain). for pain, Disp: 180 tablet, Rfl: 1     ibuprofen (ADVIL,MOTRIN) 800 MG tablet, Take 1 tablet by mouth., Disp: , Rfl:     QUEtiapine XR (SEROquel XR) 200 MG 24 hr tablet, Take 1 tablet by mouth Every Night., Disp: 90 tablet, Rfl: 1    sertraline (ZOLOFT) 100 MG tablet, Take 1 tablet by mouth Daily., Disp: 90 tablet, Rfl: 1   Family History   Problem Relation Age of Onset    Hypertension Mother     Depression Mother     Diabetes Mother     Anxiety disorder Mother     Kidney disease Mother     Multiple sclerosis Sister     No Known Problems Daughter     No Known Problems Son     No Known Problems Son     Arthritis Other     COPD Other     Diabetes type II Other     Malig Hyperthermia Neg Hx           Vital Signs:   Vitals:    01/16/25 0853 01/16/25 0917   BP: 140/90 135/85   Pulse: 92    Temp: 97.8 °F (36.6 °C)    SpO2: 98%    Weight: 69.4 kg (152 lb 14.4 oz)        Review of Systems   Constitutional:  Negative for fatigue and fever.   HENT:  Negative for sore throat.    Eyes:  Negative for visual disturbance.   Respiratory:  Negative for cough, chest tightness, shortness of breath and wheezing.    Cardiovascular:  Negative for chest pain, palpitations and leg swelling.   Gastrointestinal:  Negative for abdominal pain, diarrhea, nausea and vomiting.   Neurological:  Negative for dizziness, light-headedness and headaches.      Physical Exam  Vitals reviewed.   Constitutional:       Appearance: Normal appearance. She is well-developed.   HENT:      Head: Normocephalic and atraumatic.      Right Ear: External ear normal.      Left Ear: External ear normal.      Mouth/Throat:      Pharynx: No oropharyngeal exudate.   Eyes:      Conjunctiva/sclera: Conjunctivae normal.      Pupils: Pupils are equal, round, and reactive to light.   Cardiovascular:      Rate and Rhythm: Normal rate and regular rhythm.      Pulses: Normal pulses.      Heart sounds: Normal heart sounds. No murmur heard.     No friction rub. No gallop.   Pulmonary:      Effort: Pulmonary effort is  normal.      Breath sounds: Normal breath sounds. No wheezing or rhonchi.   Abdominal:      General: Abdomen is flat. Bowel sounds are normal. There is no distension.      Palpations: Abdomen is soft. There is no mass.      Tenderness: There is no abdominal tenderness. There is no guarding or rebound.      Hernia: No hernia is present.   Musculoskeletal:         General: Normal range of motion.      Comments: Left hand is swollen and bruised with generalized tenderness of left hand, no other tenderness in arm, normal ROM, stable, CR<2 sec in fingers   Skin:     General: Skin is warm and dry.      Capillary Refill: Capillary refill takes less than 2 seconds.   Neurological:      General: No focal deficit present.      Mental Status: She is alert and oriented to person, place, and time.      Cranial Nerves: No cranial nerve deficit.   Psychiatric:         Mood and Affect: Mood and affect normal.         Behavior: Behavior normal.         Thought Content: Thought content normal.         Judgment: Judgment normal.        Result Review :   The following data was reviewed by: Andrés Marcial MD on 01/16/2025:    Data reviewed : Radiologic studies I viewed and interpreted 3 views of left hand x-rays:no fxs.           Assessment and Plan    Diagnoses and all orders for this visit:    1. Left hand pain (Primary)  -     XR Hand 3+ View Left (In Office)    Place ice on hand 10 min at a time 2-3 times daily.        Follow Up   Return if symptoms worsen or fail to improve.  Patient was given instructions and counseling regarding her condition or for health maintenance advice. Please see specific information pulled into the AVS if appropriate.

## 2025-01-16 NOTE — LETTER
January 16, 2025     Patient: Rosie Medrano   YOB: 1970   Date of Visit: 1/16/2025       To Whom It May Concern:    It is my medical opinion that Rosie Medrano may return to work on 01/18/2025.         Sincerely,        Andrés Marcial MD    CC: No Recipients

## 2025-03-12 ENCOUNTER — HOSPITAL ENCOUNTER (OUTPATIENT)
Dept: MAMMOGRAPHY | Facility: HOSPITAL | Age: 55
Discharge: HOME | End: 2025-03-12
Payer: COMMERCIAL

## 2025-03-12 ENCOUNTER — HOSPITAL ENCOUNTER (OUTPATIENT)
Dept: CT IMAGING | Facility: HOSPITAL | Age: 55
Discharge: HOME | End: 2025-03-12
Payer: COMMERCIAL

## 2025-03-12 DIAGNOSIS — Z12.31 ENCOUNTER FOR SCREENING MAMMOGRAM FOR MALIGNANT NEOPLASM OF BREAST: ICD-10-CM

## 2025-03-12 DIAGNOSIS — Z12.2 SCREENING FOR LUNG CANCER: ICD-10-CM

## 2025-03-12 PROCEDURE — 77067 SCR MAMMO BI INCL CAD: CPT

## 2025-03-12 PROCEDURE — 71271 CT THORAX LUNG CANCER SCR C-: CPT

## 2025-03-12 PROCEDURE — 77063 BREAST TOMOSYNTHESIS BI: CPT

## 2025-03-13 DIAGNOSIS — R91.8 LUNG NODULES: Primary | ICD-10-CM

## 2025-03-18 DIAGNOSIS — M19.90 UNSPECIFIED OSTEOARTHRITIS, UNSPECIFIED SITE: ICD-10-CM

## 2025-03-18 RX ORDER — IBUPROFEN 800 MG/1
TABLET, FILM COATED ORAL
Qty: 30 TABLET | Refills: 5 | OUTPATIENT
Start: 2025-03-18

## 2025-06-09 ENCOUNTER — OFFICE VISIT (OUTPATIENT)
Dept: FAMILY MEDICINE CLINIC | Facility: CLINIC | Age: 55
End: 2025-06-09
Payer: COMMERCIAL

## 2025-06-09 VITALS
BODY MASS INDEX: 25.07 KG/M2 | SYSTOLIC BLOOD PRESSURE: 142 MMHG | HEIGHT: 66 IN | WEIGHT: 156 LBS | HEART RATE: 78 BPM | TEMPERATURE: 97.8 F | OXYGEN SATURATION: 99 % | DIASTOLIC BLOOD PRESSURE: 90 MMHG

## 2025-06-09 DIAGNOSIS — R11.0 NAUSEA: ICD-10-CM

## 2025-06-09 DIAGNOSIS — H66.003 NON-RECURRENT ACUTE SUPPURATIVE OTITIS MEDIA OF BOTH EARS WITHOUT SPONTANEOUS RUPTURE OF TYMPANIC MEMBRANES: Primary | ICD-10-CM

## 2025-06-09 DIAGNOSIS — H65.93 FLUID LEVEL BEHIND TYMPANIC MEMBRANE OF BOTH EARS: ICD-10-CM

## 2025-06-09 DIAGNOSIS — R42 DIZZINESS: ICD-10-CM

## 2025-06-09 DIAGNOSIS — R03.0 ELEVATED BLOOD PRESSURE READING: ICD-10-CM

## 2025-06-09 DIAGNOSIS — Z87.891 PERSONAL HISTORY OF TOBACCO USE, PRESENTING HAZARDS TO HEALTH: ICD-10-CM

## 2025-06-09 PROCEDURE — 99214 OFFICE O/P EST MOD 30 MIN: CPT

## 2025-06-09 RX ORDER — MECLIZINE HYDROCHLORIDE 25 MG/1
25 TABLET ORAL 3 TIMES DAILY PRN
Qty: 30 TABLET | Refills: 0 | Status: SHIPPED | OUTPATIENT
Start: 2025-06-09

## 2025-06-09 RX ORDER — OXYMETAZOLINE HYDROCHLORIDE 0.05 G/100ML
2 SPRAY NASAL 2 TIMES DAILY
Qty: 6 ML | Refills: 0 | Status: SHIPPED | OUTPATIENT
Start: 2025-06-09 | End: 2025-06-12

## 2025-06-09 RX ORDER — ONDANSETRON 8 MG/1
8 TABLET, FILM COATED ORAL EVERY 8 HOURS PRN
Qty: 20 TABLET | Refills: 0 | Status: SHIPPED | OUTPATIENT
Start: 2025-06-09

## 2025-06-09 NOTE — PROGRESS NOTES
"Chief Complaint  Fatigue (Legs feel weak and fatigued like they are going to give out, started last night) and Dizziness (Started this morning)    The PHQ has not been completed during this encounter.         History of Present Illness:  Rosie Medrano is a 54 y.o. female who presents to [facility] with a past medical history of  Past Medical History:   Diagnosis Date    Anxiety     Clotting disorder April 2023    Depression     GERD (gastroesophageal reflux disease)     Gross hematuria     Kidney stone     Muscle spasm     Ureteral stone     Urinary tract infection April 2023    Blood in urine        History of Present Illness  The patient is a 54-year-old female who presents today with complaints of dizziness and loss of balance.    She reports experiencing dizziness and a sensation of imbalance, akin to intoxication, which began the previous night. She has not experienced any falls or head trauma. There is no ear pain, recent illness, or symptoms such as drainage or cough. She recalls an episode of difficulty breathing a few days prior but does not consider it significant. She has not engaged in swimming activities recently. She reports no facial flushing but feels nauseous. There are no episodes of diarrhea. She experienced neck and head pain yesterday and reports a sensation of weakness in her legs. There is mild nasal congestion but no cough. She does not have any dental pain. There are no visual disturbances associated with her dizziness.    Her blood pressure readings have been borderline during her last few checks.    SOCIAL HISTORY  The patient smokes.      Objective   Vital Signs:   Vitals:    06/09/25 0921 06/09/25 1014   BP: 162/98 142/90   Pulse: 78    Temp: 97.8 °F (36.6 °C)    SpO2: 99%    Weight: 70.8 kg (156 lb)    Height: 167.6 cm (66\")      Body mass index is 25.18 kg/m².    Wt Readings from Last 3 Encounters:   06/09/25 70.8 kg (156 lb)   01/16/25 69.4 kg (152 " lb 14.4 oz)   12/19/24 68.8 kg (151 lb 11.2 oz)     BP Readings from Last 3 Encounters:   06/09/25 142/90   01/16/25 135/85   12/19/24 130/80       Health Maintenance   Topic Date Due    Pneumococcal Vaccine 50+ (1 of 2 - PCV) Never done    PAP SMEAR  Never done    HEPATITIS C SCREENING  Never done    ZOSTER VACCINE (1 of 2) Never done    COVID-19 Vaccine (1 - 2024-25 season) Never done    ANNUAL PHYSICAL  02/28/2025    INFLUENZA VACCINE  07/01/2025    LIPID PANEL  08/06/2025    LUNG CANCER SCREENING  03/12/2026    MAMMOGRAM  03/12/2027    COLORECTAL CANCER SCREENING  08/18/2027    TDAP/TD VACCINES (2 - Td or Tdap) 08/06/2034       Review of Systems   Physical Exam  Vitals reviewed.   Constitutional:       Appearance: Normal appearance.   HENT:      Right Ear: Tympanic membrane is injected and erythematous.      Left Ear: Tympanic membrane is injected and erythematous.   Eyes:      General: Lids are normal.      Extraocular Movements: Extraocular movements intact.      Conjunctiva/sclera: Conjunctivae normal.      Pupils: Pupils are equal, round, and reactive to light.   Cardiovascular:      Rate and Rhythm: Normal rate and regular rhythm.   Pulmonary:      Effort: Pulmonary effort is normal.      Breath sounds: Normal breath sounds.   Skin:     General: Skin is warm and dry.   Neurological:      General: No focal deficit present.      Mental Status: She is alert and oriented to person, place, and time.      Gait: Gait is intact.   Psychiatric:         Mood and Affect: Mood normal.            Result Review :  The following data was reviewed by: RAYMOND Chong on 06/09/2025:  No visits with results within 1 Month(s) from this visit.   Latest known visit with results is:   Results Encounter on 08/06/2024   Component Date Value    Cologuard 08/18/2024 Negative        Results        Procedures        Assessment and Plan   Diagnoses and all orders for this visit:    1. Non-recurrent acute suppurative otitis media of  both ears without spontaneous rupture of tympanic membranes (Primary)  -     amoxicillin-clavulanate (AUGMENTIN) 875-125 MG per tablet; Take 1 tablet by mouth 2 (Two) Times a Day for 7 days.  Dispense: 14 tablet; Refill: 0  -     meclizine (Medi-Meclizine) 25 MG tablet; Take 1 tablet by mouth 3 (Three) Times a Day As Needed for Dizziness.  Dispense: 30 tablet; Refill: 0  -     ondansetron (ZOFRAN) 8 MG tablet; Take 1 tablet by mouth Every 8 (Eight) Hours As Needed for Nausea or Vomiting.  Dispense: 20 tablet; Refill: 0  -     oxymetazoline (AFRIN) 0.05 % nasal spray; Administer 2 sprays into the nostril(s) as directed by provider 2 (Two) Times a Day for 3 days. Instill 2 sprays into each nostril twice daily for nasal congestion.  Dispense: 6 mL; Refill: 0    2. Dizziness  -     meclizine (Medi-Meclizine) 25 MG tablet; Take 1 tablet by mouth 3 (Three) Times a Day As Needed for Dizziness.  Dispense: 30 tablet; Refill: 0    3. Nausea  -     ondansetron (ZOFRAN) 8 MG tablet; Take 1 tablet by mouth Every 8 (Eight) Hours As Needed for Nausea or Vomiting.  Dispense: 20 tablet; Refill: 0    4. Fluid level behind tympanic membrane of both ears  -     oxymetazoline (AFRIN) 0.05 % nasal spray; Administer 2 sprays into the nostril(s) as directed by provider 2 (Two) Times a Day for 3 days. Instill 2 sprays into each nostril twice daily for nasal congestion.  Dispense: 6 mL; Refill: 0    5. Elevated blood pressure reading    6. Personal history of tobacco use, presenting hazards to health        Assessment & Plan  1. Bilateral otitis Media.  - Symptoms of dizziness, nausea, and loss of balance are likely due to fluid accumulation in the ears, causing vertigo. The fluid may have shifted the crystals in the inner ear, disrupting her sense of balance.  - Physical exam revealed super red and cloudy ears with fluid accumulation.  - Discussion included the mechanism of vertigo related to ear fluid and crystals, and the importance of  avoiding rapid position changes and maintaining hydration.  - Meclizine 25 mg up to three times daily, Augmentin 875 mg twice daily for 10 days, Zofran 4 mg as needed for nausea, and Afrin nasal spray twice daily for two days were prescribed. Follow-up advised if symptoms do not improve by 06/11/2025.    2. Elevated blood pressure.  - Blood pressure readings were 162/98 and 146/90, which are elevated.  - Blood pressure may be influenced by current symptoms or smoking habit.  - Advised to monitor blood pressure at home and keep a log. Discussion included the potential need for medication if blood pressure remains high over the next week or two.  - Provided a blood pressure log sheet for home monitoring.      BMI is >= 25 and <30. (Overweight) The following options were offered after discussion;: exercise counseling/recommendations and nutrition counseling/recommendations     Rosie Medrano  reports that she has been smoking cigarettes. She started smoking about 37 years ago. She has a 37.6 pack-year smoking history. She has been exposed to tobacco smoke. She has never used smokeless tobacco. I have educated her on the risk of diseases from using tobacco products such as cancer, COPD, and heart disease.     I spent 3  minutes counseling the patient.           FOLLOW UP  Return if symptoms worsen or fail to improve.    Patient was given instructions and counseling regarding her condition or for health maintenance advice. Please see specific information pulled into the AVS if appropriate.       RAYMOND Chong  06/09/25  11:18 EDT    CURRENT & DISCONTINUED MEDICATIONS  Current Outpatient Medications   Medication Instructions    amoxicillin-clavulanate (AUGMENTIN) 875-125 MG per tablet 1 tablet, Oral, 2 Times Daily    cyclobenzaprine (FLEXERIL) 5 mg, Oral, 3 Times Daily PRN    diclofenac (VOLTAREN) 50 mg, Oral, 2 Times Daily PRN, for pain    ibuprofen (ADVIL,MOTRIN) 800 mg    meclizine (MEDI-MECLIZINE) 25 mg, Oral,  3 Times Daily PRN    ondansetron (ZOFRAN) 8 mg, Oral, Every 8 Hours PRN    oxymetazoline (AFRIN) 0.05 % nasal spray 2 sprays, Nasal, 2 Times Daily, Instill 2 sprays into each nostril twice daily for nasal congestion.    QUEtiapine XR (SEROQUEL XR) 200 mg, Oral, Nightly    sertraline (ZOLOFT) 100 mg, Oral, Daily       There are no discontinued medications.     EMR Dragon/Transcription disclaimer:  Parts of this encounter note are electronic transcription/translation of spoken language to printed text.     Patient or patient representative verbalized consent for the use of Ambient Listening during the visit with  RAYMOND Chong for chart documentation. 6/9/2025  09:57 EDT

## 2025-06-17 ENCOUNTER — TELEPHONE (OUTPATIENT)
Dept: FAMILY MEDICINE CLINIC | Facility: CLINIC | Age: 55
End: 2025-06-17
Payer: COMMERCIAL

## 2025-06-17 ENCOUNTER — APPOINTMENT (OUTPATIENT)
Facility: HOSPITAL | Age: 55
DRG: 066 | End: 2025-06-17
Payer: COMMERCIAL

## 2025-06-17 ENCOUNTER — APPOINTMENT (OUTPATIENT)
Dept: MRI IMAGING | Facility: HOSPITAL | Age: 55
DRG: 066 | End: 2025-06-17
Payer: COMMERCIAL

## 2025-06-17 ENCOUNTER — HOSPITAL ENCOUNTER (INPATIENT)
Facility: HOSPITAL | Age: 55
LOS: 2 days | Discharge: HOME | DRG: 066 | End: 2025-06-19
Attending: EMERGENCY MEDICINE | Admitting: STUDENT IN AN ORGANIZED HEALTH CARE EDUCATION/TRAINING PROGRAM
Payer: COMMERCIAL

## 2025-06-17 ENCOUNTER — APPOINTMENT (OUTPATIENT)
Dept: GENERAL RADIOLOGY | Facility: HOSPITAL | Age: 55
DRG: 066 | End: 2025-06-17
Payer: COMMERCIAL

## 2025-06-17 ENCOUNTER — OFFICE VISIT (OUTPATIENT)
Dept: FAMILY MEDICINE CLINIC | Facility: CLINIC | Age: 55
End: 2025-06-17
Payer: COMMERCIAL

## 2025-06-17 ENCOUNTER — APPOINTMENT (OUTPATIENT)
Dept: CT IMAGING | Facility: HOSPITAL | Age: 55
DRG: 066 | End: 2025-06-17
Payer: COMMERCIAL

## 2025-06-17 VITALS
BODY MASS INDEX: 24.11 KG/M2 | OXYGEN SATURATION: 98 % | SYSTOLIC BLOOD PRESSURE: 124 MMHG | HEART RATE: 89 BPM | DIASTOLIC BLOOD PRESSURE: 88 MMHG | WEIGHT: 150 LBS | HEIGHT: 66 IN | TEMPERATURE: 98 F

## 2025-06-17 DIAGNOSIS — R13.12 DYSPHAGIA, OROPHARYNGEAL: ICD-10-CM

## 2025-06-17 DIAGNOSIS — R25.2 LEG CRAMPING: ICD-10-CM

## 2025-06-17 DIAGNOSIS — I63.9 CEREBELLAR STROKE, ACUTE: Primary | ICD-10-CM

## 2025-06-17 DIAGNOSIS — R47.01 APHASIA: ICD-10-CM

## 2025-06-17 DIAGNOSIS — R26.2 DIFFICULTY IN WALKING: ICD-10-CM

## 2025-06-17 DIAGNOSIS — R68.2 DRY MOUTH: ICD-10-CM

## 2025-06-17 DIAGNOSIS — R42 DIZZINESS: Primary | ICD-10-CM

## 2025-06-17 DIAGNOSIS — I63.9 CEREBROVASCULAR ACCIDENT (CVA), UNSPECIFIED MECHANISM: ICD-10-CM

## 2025-06-17 DIAGNOSIS — R42 DIZZINESS: ICD-10-CM

## 2025-06-17 DIAGNOSIS — Z78.9 DECREASED ACTIVITIES OF DAILY LIVING (ADL): ICD-10-CM

## 2025-06-17 LAB
ALBUMIN SERPL-MCNC: 4.7 G/DL (ref 3.5–5.2)
ALBUMIN/GLOB SERPL: 1.7 G/DL
ALP SERPL-CCNC: 99 U/L (ref 39–117)
ALT SERPL W P-5'-P-CCNC: 9 U/L (ref 1–33)
ANION GAP SERPL CALCULATED.3IONS-SCNC: 9.6 MMOL/L (ref 5–15)
AST SERPL-CCNC: 24 U/L (ref 1–32)
BACTERIA UR QL AUTO: ABNORMAL /HPF
BASOPHILS # BLD AUTO: 0.1 10*3/MM3 (ref 0–0.2)
BASOPHILS NFR BLD AUTO: 1.1 % (ref 0–1.5)
BILIRUB SERPL-MCNC: 0.4 MG/DL (ref 0–1.2)
BILIRUB UR QL STRIP: NEGATIVE
BUN SERPL-MCNC: 17.7 MG/DL (ref 6–20)
BUN/CREAT SERPL: 15.5 (ref 7–25)
CALCIUM SPEC-SCNC: 9.6 MG/DL (ref 8.6–10.5)
CHLORIDE SERPL-SCNC: 97 MMOL/L (ref 98–107)
CLARITY UR: ABNORMAL
CO2 SERPL-SCNC: 26.4 MMOL/L (ref 22–29)
COLOR UR: ABNORMAL
CREAT SERPL-MCNC: 1.14 MG/DL (ref 0.57–1)
DEPRECATED RDW RBC AUTO: 46.7 FL (ref 37–54)
EGFRCR SERPLBLD CKD-EPI 2021: 57.7 ML/MIN/1.73
EOSINOPHIL # BLD AUTO: 0.18 10*3/MM3 (ref 0–0.4)
EOSINOPHIL NFR BLD AUTO: 2.1 % (ref 0.3–6.2)
ERYTHROCYTE [DISTWIDTH] IN BLOOD BY AUTOMATED COUNT: 13.2 % (ref 12.3–15.4)
GEN 5 1HR TROPONIN T REFLEX: <6 NG/L
GLOBULIN UR ELPH-MCNC: 2.7 GM/DL
GLUCOSE BLDC GLUCOMTR-MCNC: 119 MG/DL (ref 70–99)
GLUCOSE SERPL-MCNC: 142 MG/DL (ref 65–99)
GLUCOSE UR STRIP-MCNC: NEGATIVE MG/DL
HBA1C MFR BLD: 5.1 % (ref 4.8–5.6)
HCG INTACT+B SERPL-ACNC: 3.06 MIU/ML
HCT VFR BLD AUTO: 50.8 % (ref 34–46.6)
HGB BLD-MCNC: 17.1 G/DL (ref 12–15.9)
HGB UR QL STRIP.AUTO: NEGATIVE
HOLD SPECIMEN: NORMAL
HOLD SPECIMEN: NORMAL
HYALINE CASTS UR QL AUTO: ABNORMAL /LPF
IMM GRANULOCYTES # BLD AUTO: 0.02 10*3/MM3 (ref 0–0.05)
IMM GRANULOCYTES NFR BLD AUTO: 0.2 % (ref 0–0.5)
INR PPP: 1.01 (ref 0.86–1.15)
KETONES UR QL STRIP: ABNORMAL
LEUKOCYTE ESTERASE UR QL STRIP.AUTO: ABNORMAL
LYMPHOCYTES # BLD AUTO: 2.8 10*3/MM3 (ref 0.7–3.1)
LYMPHOCYTES NFR BLD AUTO: 32.1 % (ref 19.6–45.3)
MAGNESIUM SERPL-MCNC: 2.5 MG/DL (ref 1.6–2.6)
MCH RBC QN AUTO: 32.1 PG (ref 26.6–33)
MCHC RBC AUTO-ENTMCNC: 33.7 G/DL (ref 31.5–35.7)
MCV RBC AUTO: 95.5 FL (ref 79–97)
MONOCYTES # BLD AUTO: 0.48 10*3/MM3 (ref 0.1–0.9)
MONOCYTES NFR BLD AUTO: 5.5 % (ref 5–12)
NEUTROPHILS NFR BLD AUTO: 5.15 10*3/MM3 (ref 1.7–7)
NEUTROPHILS NFR BLD AUTO: 59 % (ref 42.7–76)
NITRITE UR QL STRIP: NEGATIVE
NRBC BLD AUTO-RTO: 0 /100 WBC (ref 0–0.2)
PH UR STRIP.AUTO: 6 [PH] (ref 5–8)
PLATELET # BLD AUTO: 304 10*3/MM3 (ref 140–450)
PMV BLD AUTO: 9.7 FL (ref 6–12)
POTASSIUM SERPL-SCNC: 4.9 MMOL/L (ref 3.5–5.2)
PROT SERPL-MCNC: 7.4 G/DL (ref 6–8.5)
PROT UR QL STRIP: NEGATIVE
PROTHROMBIN TIME: 13.7 SECONDS (ref 11.8–14.9)
QT INTERVAL: 429 MS
QTC INTERVAL: 459 MS
RBC # BLD AUTO: 5.32 10*6/MM3 (ref 3.77–5.28)
RBC # UR STRIP: ABNORMAL /HPF
REF LAB TEST METHOD: ABNORMAL
SODIUM SERPL-SCNC: 133 MMOL/L (ref 136–145)
SP GR UR STRIP: 1.03 (ref 1–1.03)
SQUAMOUS #/AREA URNS HPF: ABNORMAL /HPF
TROPONIN T NUMERIC DELTA: NORMAL
TROPONIN T SERPL HS-MCNC: <6 NG/L
TSH SERPL DL<=0.05 MIU/L-ACNC: 0.91 UIU/ML (ref 0.27–4.2)
URATE CRY URNS QL MICRO: ABNORMAL /HPF
UROBILINOGEN UR QL STRIP: ABNORMAL
WBC # UR STRIP: ABNORMAL /HPF
WBC NRBC COR # BLD AUTO: 8.73 10*3/MM3 (ref 3.4–10.8)
WHOLE BLOOD HOLD COAG: NORMAL
WHOLE BLOOD HOLD SPECIMEN: NORMAL

## 2025-06-17 PROCEDURE — 25810000003 SODIUM CHLORIDE 0.9 % SOLUTION

## 2025-06-17 PROCEDURE — 99233 SBSQ HOSP IP/OBS HIGH 50: CPT | Performed by: PSYCHIATRY & NEUROLOGY

## 2025-06-17 PROCEDURE — 99222 1ST HOSP IP/OBS MODERATE 55: CPT | Performed by: STUDENT IN AN ORGANIZED HEALTH CARE EDUCATION/TRAINING PROGRAM

## 2025-06-17 PROCEDURE — 81001 URINALYSIS AUTO W/SCOPE: CPT | Performed by: EMERGENCY MEDICINE

## 2025-06-17 PROCEDURE — 85610 PROTHROMBIN TIME: CPT

## 2025-06-17 PROCEDURE — 83036 HEMOGLOBIN GLYCOSYLATED A1C: CPT | Performed by: STUDENT IN AN ORGANIZED HEALTH CARE EDUCATION/TRAINING PROGRAM

## 2025-06-17 PROCEDURE — 83735 ASSAY OF MAGNESIUM: CPT

## 2025-06-17 PROCEDURE — 84702 CHORIONIC GONADOTROPIN TEST: CPT

## 2025-06-17 PROCEDURE — 70450 CT HEAD/BRAIN W/O DYE: CPT

## 2025-06-17 PROCEDURE — 93880 EXTRACRANIAL BILAT STUDY: CPT | Performed by: SURGERY

## 2025-06-17 PROCEDURE — 36415 COLL VENOUS BLD VENIPUNCTURE: CPT

## 2025-06-17 PROCEDURE — 93005 ELECTROCARDIOGRAM TRACING: CPT | Performed by: EMERGENCY MEDICINE

## 2025-06-17 PROCEDURE — 99291 CRITICAL CARE FIRST HOUR: CPT

## 2025-06-17 PROCEDURE — 84484 ASSAY OF TROPONIN QUANT: CPT

## 2025-06-17 PROCEDURE — 80050 GENERAL HEALTH PANEL: CPT

## 2025-06-17 PROCEDURE — 82948 REAGENT STRIP/BLOOD GLUCOSE: CPT

## 2025-06-17 PROCEDURE — 70551 MRI BRAIN STEM W/O DYE: CPT

## 2025-06-17 PROCEDURE — 93010 ELECTROCARDIOGRAM REPORT: CPT | Performed by: INTERNAL MEDICINE

## 2025-06-17 PROCEDURE — 93880 EXTRACRANIAL BILAT STUDY: CPT

## 2025-06-17 PROCEDURE — 97161 PT EVAL LOW COMPLEX 20 MIN: CPT | Performed by: PHYSICAL THERAPIST

## 2025-06-17 PROCEDURE — 71045 X-RAY EXAM CHEST 1 VIEW: CPT

## 2025-06-17 PROCEDURE — 93005 ELECTROCARDIOGRAM TRACING: CPT

## 2025-06-17 RX ORDER — ASPIRIN 300 MG/1
300 SUPPOSITORY RECTAL DAILY
Status: DISCONTINUED | OUTPATIENT
Start: 2025-06-18 | End: 2025-06-19 | Stop reason: HOSPADM

## 2025-06-17 RX ORDER — ATORVASTATIN CALCIUM 40 MG/1
80 TABLET, FILM COATED ORAL NIGHTLY
Status: DISCONTINUED | OUTPATIENT
Start: 2025-06-17 | End: 2025-06-19 | Stop reason: HOSPADM

## 2025-06-17 RX ORDER — SODIUM CHLORIDE 0.9 % (FLUSH) 0.9 %
10 SYRINGE (ML) INJECTION AS NEEDED
Status: DISCONTINUED | OUTPATIENT
Start: 2025-06-17 | End: 2025-06-19 | Stop reason: HOSPADM

## 2025-06-17 RX ORDER — SODIUM CHLORIDE 9 MG/ML
40 INJECTION, SOLUTION INTRAVENOUS AS NEEDED
Status: DISCONTINUED | OUTPATIENT
Start: 2025-06-17 | End: 2025-06-19 | Stop reason: HOSPADM

## 2025-06-17 RX ORDER — ASPIRIN 81 MG/1
81 TABLET, CHEWABLE ORAL DAILY
Status: DISCONTINUED | OUTPATIENT
Start: 2025-06-18 | End: 2025-06-19 | Stop reason: HOSPADM

## 2025-06-17 RX ORDER — ENOXAPARIN SODIUM 100 MG/ML
40 INJECTION SUBCUTANEOUS DAILY
Status: DISCONTINUED | OUTPATIENT
Start: 2025-06-18 | End: 2025-06-19 | Stop reason: HOSPADM

## 2025-06-17 RX ORDER — SODIUM CHLORIDE 0.9 % (FLUSH) 0.9 %
10 SYRINGE (ML) INJECTION EVERY 12 HOURS SCHEDULED
Status: DISCONTINUED | OUTPATIENT
Start: 2025-06-17 | End: 2025-06-19 | Stop reason: HOSPADM

## 2025-06-17 RX ADMIN — ATORVASTATIN CALCIUM 80 MG: 40 TABLET, FILM COATED ORAL at 23:01

## 2025-06-17 RX ADMIN — SODIUM CHLORIDE 1000 ML: 9 INJECTION, SOLUTION INTRAVENOUS at 12:51

## 2025-06-17 RX ADMIN — Medication 10 ML: at 23:01

## 2025-06-17 NOTE — TELEPHONE ENCOUNTER
Patient called to inform you that she is on the way to [facility].

## 2025-06-17 NOTE — CONSULTS
[facility]  [facility]    Stat Consult    Patient Name:   Rosie Medrano  YOB: 1970  Identification Number:   MRN - [id number]  Date of Service:   06/17/2025 16:51:29    Diagnosis:        I63.00 - Cerebrovascular accident (CVA) due to thrombosis of precerebral artery (HCCC)    Impression  Patient is a 54-year-old lady with no significant past medical history presents for evaluation of slurred speech and dizziness.    Assessment  Right cerebellar infarct    Pt will be admitted for stroke work up.         Recommendations:  Our recommendations are outlined below.    Diagnostic Studies :  CTA head and neck with contrast    Laboratory Studies :  Lipid panel  I ordered  Hemoglobin A1c    Antithrombotic Medication :  Aspirin 81 mg PO daily    Nursing Recommendations :  IV Fluids, avoid dextrose containing fluids, Maintain euglycemia  Neuro checks q4 hrs x 24 hrs and then per shift  Head of bed 30 degrees  Continue with Telemetry    Consultations :  Recommend Speech therapy if failed dysphagia screen  Physical therapy/Occupational therapy    DVT Prophylaxis :  Choice of Primary Team    Disposition :  Neurology will follow        ----------------------------------------------------------------------------------------------------      Advanced Imaging:  Advanced Imaging Deferred because:    Pt will be admitted for MRI        Metrics:  Dispatch Time: 06/17/2025 16:50:21  Callback Response Time: 06/17/2025 16:52:08    Primary Provider Notified of Diagnostic Impression and Management Plan on: 06/17/2025 17:41:57      CT HEAD:  I personally reviewed all the CT images that were available to me and it showed: Small linear area of hypoattenuation within the right cerebellum, not present on prior brain MRI but without acute features by CT and may represent sequela of prior insult. No acute intracranial findings otherwise. Brain MRI could be considered for      Imaging  MRI  brain  1.Acute infarctions involving right cerebellum. No hemorrhagic transformation or significant mass effect.  2.Findings suggestive of mild chronic small vessel ischemic disease.      ----------------------------------------------------------------------------------------------------    Chief Complaint:  dizzy, slurred speech    History of Present Illness:  Patient is a 54 year old Female.  Patient is a 54-year-old lady with no significant past medical history presents for evaluation of slurred speech and dizziness.  She reports that the symptoms began 1 week ago. She presents to the hospital today. MRI brain was done which revealed a right cerebellar stroke.       Past Medical History:       There is no history of Hypertension       There is no history of Diabetes Mellitus       There is no history of Hyperlipidemia       There is no history of Atrial Fibrillation       There is no history of Coronary Artery Disease       There is no history of Stroke       There is no history of Covid-19       There is no history of Seizures       There is no history of Migraine Headaches       There is no history of Dementia/MCI    Medications:    No Anticoagulant use   No Antiplatelet use  Reviewed EMR for current medications    Allergies:   Reviewed    Social History:  Smoking: No  Alcohol Use: No  Drug Use: No    Family History:    There is no family history of premature cerebrovascular disease pertinent to this consultation    ROS :  14 Points Review of Systems was performed and was negative except mentioned in HPI.    Past Surgical History:  There Is No Surgical History Contributory To Today’s Visit       Examination:  BP(131/81), Pulse(68),  1A: Level of Consciousness - Alert; keenly responsive + 0  1B: Ask Month and Age - Both Questions Right + 0  1C: Blink Eyes & Squeeze Hands - Performs Both Tasks + 0  2: Test Horizontal Extraocular Movements - Normal + 0  3: Test Visual Fields - No Visual Loss + 0  4: Test Facial  Palsy (Use Grimace if Obtunded) - Normal symmetry + 0  5A: Test Left Arm Motor Drift - No Drift for 10 Seconds + 0  5B: Test Right Arm Motor Drift - No Drift for 10 Seconds + 0  6A: Test Left Leg Motor Drift - No Drift for 5 Seconds + 0  6B: Test Right Leg Motor Drift - No Drift for 5 Seconds + 0  7: Test Limb Ataxia (FNF/Heel-Shin) - No Ataxia + 0  8: Test Sensation - Normal; No sensory loss + 0  9: Test Language/Aphasia - Normal; No aphasia + 0  10: Test Dysarthria - Mild-Moderate Dysarthria: Slurring but can be understood + 1  11: Test Extinction/Inattention - No abnormality + 0    NIHSS Score: 1    Spoke with : Stefany        This consult was conducted in real time using interactive audio and video technology. Patient was informed of the technology being used for this visit and agreed to proceed. Patient located in hospital and provider located at home/office setting.    Patient is being evaluated for possible acute neurologic impairment and high probability of imminent or life - threatening deterioration.I spent total of 35 minutes providing care to this patient, including time for face to face visit via telemedicine, review of medical records, imaging studies and discussion of findings with providers, the patient and / or family.      Dr Roberto Brown      [facility]  For Inpatient follow-up with [facility] physician please call [facility] at [phone]. As we are not an outpatient service for any post hospital discharge needs please contact the hospital for assistance.    If you have any questions for the [facility] physicians or need to reconsult for clinical or diagnostic changes please contact us via [facility] at [phone].

## 2025-06-17 NOTE — ED PROVIDER NOTES
"SHARED VISIT ATTESTATION:    This visit was performed by myself and an APC.  I personally approved the management plan/medical decision making and take responsibility for the patient management.      SHARED VISIT NOTE:    Patient is 54 y.o. year old female that presents to the ED for evaluation of dizziness.     Physical Exam    ED Course:    /72   Pulse 68   Temp 98.1 °F (36.7 °C) (Oral)   Resp 18   Ht 167.6 cm (66\")   Wt 68.5 kg (151 lb 0.2 oz)   SpO2 96%   Breastfeeding No   BMI 24.37 kg/m²       The following orders were placed and all results were independently analyzed by me:  Orders Placed This Encounter   Procedures    XR Chest 1 View    CT Head Without Contrast    MRI Brain Without Contrast    [facility] Draw    Comprehensive Metabolic Panel    Urinalysis With Culture If Indicated -    CBC Auto Differential    Urinalysis, Microscopic Only - Urine, Clean Catch    hCG, Quantitative, Pregnancy    TSH Rfx On Abnormal To Free T4    Magnesium    High Sensitivity Troponin T    High Sensitivity Troponin T 1Hr    Protime-INR    NPO Diet NPO Type: Strict NPO    Undress & Gown    Continuous Pulse Oximetry    Vital Signs    Orthostatic Blood Pressure    Orthostatic Vitals (Blood Pressure & Heart Rate)    Code Status and Medical Interventions: CPR (Attempt to Resuscitate); Full Support    IP General Consult (Use specialty-specific consult if known)    Inpatient Hospitalist Consult    PT Consult: Eval & Treat Functional Mobility Below Baseline    PT Plan of Care Cert / Re-Cert    Oxygen Therapy- Nasal Cannula; Titrate 1-6 LPM Per SpO2; 90 - 95%    POC Glucose Once    ECG 12 Lead ED Triage Standing Order; Weak / Dizzy / AMS    Insert Peripheral IV    Inpatient Admission    Fall Precautions    CBC & Differential    Green Top (Gel)    Lavender Top    Gold Top - SST    Light Blue Top       Medications Given in the Emergency Department:  Medications   sodium chloride 0.9 % flush 10 mL (has no administration in time " range)   sodium chloride 0.9 % bolus 1,000 mL (0 mL Intravenous Stopped 6/17/25 1321)        ED Course:    ED Course as of 06/17/25 1817   Tue Jun 17, 2025   1448 Patient was assessed by physical therapist for evaluation of BPPV.  This testing was negative.  She also tested patient for vestibular neuritis which was negative. [AS]   1652 MRI Brain Without Contrast  Acute infarctions of cerebellum. [AS]   1746 Neurologist on-call states patient will need admission for stroke workup. [AS]      ED Course User Index  [AS] Deanna Tomlinson PA-C       Labs:    Lab Results (last 24 hours)       Procedure Component Value Units Date/Time    CBC & Differential [[id number]]  (Abnormal) Collected: 06/17/25 1105    Specimen: Blood from Arm, Right Updated: 06/17/25 1114    Narrative:      The following orders were created for panel order CBC & Differential.  Procedure                               Abnormality         Status                     ---------                               -----------         ------                     CBC Auto Differential[[id number]]        Abnormal            Final result                 Please view results for these tests on the individual orders.    Comprehensive Metabolic Panel [[id number]]  (Abnormal) Collected: 06/17/25 1105    Specimen: Blood from Arm, Right Updated: 06/17/25 1133     Glucose 142 mg/dL      BUN 17.7 mg/dL      Creatinine 1.14 mg/dL      Sodium 133 mmol/L      Potassium 4.9 mmol/L      Chloride 97 mmol/L      CO2 26.4 mmol/L      Calcium 9.6 mg/dL      Total Protein 7.4 g/dL      Albumin 4.7 g/dL      ALT (SGPT) 9 U/L      AST (SGOT) 24 U/L      Alkaline Phosphatase 99 U/L      Total Bilirubin 0.4 mg/dL      Globulin 2.7 gm/dL      A/G Ratio 1.7 g/dL      BUN/Creatinine Ratio 15.5     Anion Gap 9.6 mmol/L      eGFR 57.7 mL/min/1.73     Narrative:      GFR Categories in Chronic Kidney Disease (CKD)              GFR Category          GFR (mL/min/1.73)    Interpretation  G1                     90 or greater        Normal or high (1)  G2                    60-89                Mild decrease (1)  G3a                   45-59                Mild to moderate decrease  G3b                   30-44                Moderate to severe decrease  G4                    15-29                Severe decrease  G5                    14 or less           Kidney failure    (1)In the absence of evidence of kidney disease, neither GFR category G1 or G2 fulfill the criteria for CKD.    eGFR calculation 2021 CKD-EPI creatinine equation, which does not include race as a factor    CBC Auto Differential [[id number]]  (Abnormal) Collected: 06/17/25 1105    Specimen: Blood from Arm, Right Updated: 06/17/25 1114     WBC 8.73 10*3/mm3      RBC 5.32 10*6/mm3      Hemoglobin 17.1 g/dL      Hematocrit 50.8 %      MCV 95.5 fL      MCH 32.1 pg      MCHC 33.7 g/dL      RDW 13.2 %      RDW-SD 46.7 fl      MPV 9.7 fL      Platelets 304 10*3/mm3      Neutrophil % 59.0 %      Lymphocyte % 32.1 %      Monocyte % 5.5 %      Eosinophil % 2.1 %      Basophil % 1.1 %      Immature Grans % 0.2 %      Neutrophils, Absolute 5.15 10*3/mm3      Lymphocytes, Absolute 2.80 10*3/mm3      Monocytes, Absolute 0.48 10*3/mm3      Eosinophils, Absolute 0.18 10*3/mm3      Basophils, Absolute 0.10 10*3/mm3      Immature Grans, Absolute 0.02 10*3/mm3      nRBC 0.0 /100 WBC     hCG, Quantitative, Pregnancy [[id number]] Collected: 06/17/25 1105    Specimen: Blood from Arm, Right Updated: 06/17/25 1205     HCG Quantitative 3.06 mIU/mL     Narrative:      HCG Ranges by Gestational Age    Females - non-pregnant premenopausal   </= 1mIU/mL HCG  Females - postmenopausal               </= 7mIU/mL HCG    3 Weeks       5.4   -      72 mIU/mL  4 Weeks      10.2   -     708 mIU/mL  5 Weeks       217   -   8,245 mIU/mL  6 Weeks       152   -  32,177 mIU/mL  7 Weeks     4,059   - 153,767 mIU/mL  8 Weeks    31,366   - 149,094 mIU/mL  9 Weeks    59,109   - 135,901  mIU/mL  10 Weeks   44,186   - 170,409 mIU/mL  12 Weeks   27,107   - 201,615 mIU/mL  14 Weeks   24,302   -  93,646 mIU/mL  15 Weeks   12,540   -  69,747 mIU/mL  16 Weeks    8,904   -  55,332 mIU/mL  17 Weeks    8,240   -  51,793 mIU/mL  18 Weeks    9,649   -  55,271 mIU/mL      TSH Rfx On Abnormal To Free T4 [[id number]]  (Normal) Collected: 06/17/25 1105    Specimen: Blood from Arm, Right Updated: 06/17/25 1214     TSH 0.913 uIU/mL     Magnesium [[id number]]  (Normal) Collected: 06/17/25 1105    Specimen: Blood from Arm, Right Updated: 06/17/25 1158     Magnesium 2.5 mg/dL     High Sensitivity Troponin T [[id number]]  (Normal) Collected: 06/17/25 1105    Specimen: Blood from Arm, Right Updated: 06/17/25 1227     HS Troponin T <6 ng/L     Narrative:      High Sensitive Troponin T Reference Range:  <14.0 ng/L- Negative Female for AMI  <22.0 ng/L- Negative Male for AMI  >=14 - Abnormal Female indicating possible myocardial injury.  >=22 - Abnormal Male indicating possible myocardial injury.   Clinicians would have to utilize clinical acumen, EKG, Troponin, and serial changes to determine if it is an Acute Myocardial Infarction or myocardial injury due to an underlying chronic condition.         Urinalysis With Culture If Indicated - Urine, Clean Catch [[id number]]  (Abnormal) Collected: 06/17/25 1108    Specimen: Urine, Clean Catch Updated: 06/17/25 1144     Color, UA Dark Yellow     Appearance, UA Turbid     pH, UA 6.0     Specific Gravity, UA 1.030     Glucose, UA Negative     Ketones, UA Trace     Bilirubin, UA Negative     Blood, UA Negative     Protein, UA Negative     Leuk Esterase, UA Small (1+)     Nitrite, UA Negative     Urobilinogen, UA 1.0 E.U./dL    Narrative:      In absence of clinical symptoms, the presence of pyuria, bacteria, and/or nitrites on the urinalysis result does not correlate with infection.    Urinalysis, Microscopic Only - Urine, Clean Catch [[id number]]  (Abnormal) Collected: 06/17/25 1108     Specimen: Urine, Clean Catch Updated: 06/17/25 1150     RBC, UA 0-2 /HPF      WBC, UA 0-2 /HPF      Comment: Urine culture not indicated.        Bacteria, UA Trace /HPF      Squamous Epithelial Cells, UA 3-6 /HPF      Hyaline Casts, UA None Seen /LPF      Uric Acid Crystals, UA Large/3+ /HPF      Methodology Manual Light Microscopy    High Sensitivity Troponin T 1Hr [[id number]] Collected: 06/17/25 1248    Specimen: Blood Updated: 06/17/25 1314     HS Troponin T <6 ng/L      Troponin T Numeric Delta --     Comment: Unable to calculate.       Narrative:      High Sensitive Troponin T Reference Range:  <14.0 ng/L- Negative Female for AMI  <22.0 ng/L- Negative Male for AMI  >=14 - Abnormal Female indicating possible myocardial injury.  >=22 - Abnormal Male indicating possible myocardial injury.   Clinicians would have to utilize clinical acumen, EKG, Troponin, and serial changes to determine if it is an Acute Myocardial Infarction or myocardial injury due to an underlying chronic condition.                  Imaging:    MRI Brain Without Contrast  Result Date: 6/17/2025  MRI BRAIN WO CONTRAST Date of Exam: 6/17/2025 3:45 PM EDT Indication: hypoattenuation of cerebrellum, dizzy, reported slurred speech.  Comparison: CT head from earlier today and MRI brain from August 12, 2021 Technique:  Routine multiplanar/multisequence sequence images of the brain were obtained without contrast administration. Findings: There are a couple acute infarctions involving the right cerebellum. There is no hemorrhagic transformation. The ventricles are stable in caliber, with no midline shift. The basal cisterns appear patent. Subtle foci of periventricular and subcortical white matter FLAIR hyperintensities are nonspecific, but likely the sequela of mild chronic small vessel ischemic disease. The midline structures appear intact. The globes and orbits appear intact. The intracranial vascular flow-voids appear patent. There is a partial right  mastoid effusion.     Impression: 1.Acute infarctions involving right cerebellum. No hemorrhagic transformation or significant mass effect. 2.Findings suggestive of mild chronic small vessel ischemic disease. 3.Partial right mastoid effusion. Electronically Signed: Enrrique Blanchard MD  6/17/2025 4:13 PM EDT  Workstation ID: [id number]    XR Chest 1 View  Result Date: 6/17/2025  XR CHEST 1 VW Date of Exam: 6/17/2025 12:26 PM EDT Indication: weak dizzy Comparison: Chest CT 3/12/2025, chest radiograph 11/20/2020. Findings: Cardiomediastinal silhouette is within normal limits. No focal consolidation. No pleural effusion or pneumothorax. Osseous structures are unremarkable.     Impression: No acute cardiopulmonary findings. Electronically Signed: Man Suarez MD  6/17/2025 12:50 PM EDT  Workstation ID: [id number]    CT Head Without Contrast  Result Date: 6/17/2025  CT HEAD WO CONTRAST Date of Exam: 6/17/2025 12:08 PM EDT Indication: dizzy x 1 week. Comparison: Brain MRI 8/12/2021. Technique: Axial CT images were obtained of the head without contrast administration.  Reconstructed coronal and sagittal images were also obtained. Automated exposure control and iterative construction methods were used. Findings: Small linear area of hypoattenuation in the right cerebellum without distinct correlate on prior brain MRI. No evidence of acute intracranial hemorrhage or mass effect. No extra-axial collection. Ventricles and sulci are symmetric. The mastoid air cells and paranasal sinuses are well aerated. Globes and extraocular muscles are unremarkable. No acute or suspicious osseous abnormality. Soft tissues within normal limits.     Impression: Small linear area of hypoattenuation within the right cerebellum, not present on prior brain MRI but without acute features by CT and may represent sequela of prior insult. No acute intracranial findings otherwise. Brain MRI could be considered for further evaluation if felt to be  clinically indicated. Electronically Signed: Man Suarez MD  6/17/2025 12:49 PM EDT  Workstation ID: [id number]      MDM:    Procedures    Labs were collected in the emergency department and all labs were reviewed and interpreted by me.  X-ray were performed in the emergency department and all X-ray impressions were independently interpreted by me.  An EKG was performed and the EKG was interpreted by me.  CT scan was performed in the emergency department and the CT scan radiology impression was interpreted by me.  MRI was performed in the emergency department and the MRI impression was interpreted by me.                      Micha Negron DO  18:17 EDT  06/17/25         Micha Negron DO  06/17/25 1815

## 2025-06-17 NOTE — ED PROVIDER NOTES
Time: 11:18 AM EDT  Date of encounter:  2025  Independent Historian/Clinical History and Information was obtained by:   Patient    History is limited by: N/A    Chief Complaint: Dizziness, slurred speech      History of Present Illness:  Patient is a 54 y.o. year old female who presents to the emergency department for evaluation of dizziness and slurred speech.  Patient reports symptoms have been present for over 1 week.  She denies any head injuries or falls.  Reports that she was seen by her PCP last week and started on antibiotics for an ear infection.  Patient reports she did not have any ear pain at the time.  Continues to not have ear pain.  She reports that she remains dizzy, has been trying meclizine at home with minimal relief.  She reports that when symptoms started she also began slurring her words.  She denies alcohol or drug use.  She reports that she is having difficulty with walking because of dizziness.  Reports symptoms are the worst when she is changing positions.  She denies any tinnitus, headache, vision changes.  No neck pain.  She denies unilateral weakness.  She denies facial droop.  She denies chest pain or shortness of breath.  No leg swelling.  Does report bilateral leg cramping over the past week as well.      Patient Care Team  Primary Care Provider: Andrés Marcial MD    Past Medical History:     No Known Allergies  Past Medical History:   Diagnosis Date    Anxiety     Clotting disorder 2023    Depression     GERD (gastroesophageal reflux disease)     Gross hematuria     Kidney stone     Muscle spasm     Ureteral stone     Urinary tract infection 2023    Blood in urine     Past Surgical History:   Procedure Laterality Date     SECTION      ENDOMETRIAL ABLATION      INGUINAL HERNIA REPAIR      LAPAROSCOPIC TUBAL LIGATION      TUBAL ABDOMINAL LIGATION  05    URETEROSCOPY LASER LITHOTRIPSY WITH STENT INSERTION Right 2023    Procedure: CYSTOSCOPY  right URETEROSCOPY  HOLMIUM LASER STENT INSERTION, BILATERAL RETROGRADE;  Surgeon: Samra Mac MD;  Location: [facility] MAIN OR;  Service: Urology;  Laterality: Right;     Family History   Problem Relation Age of Onset    Hypertension Mother     Depression Mother     Diabetes Mother     Anxiety disorder Mother     Kidney disease Mother     Multiple sclerosis Sister     No Known Problems Daughter     No Known Problems Son     No Known Problems Son     Arthritis Other     COPD Other     Diabetes type II Other     Malig Hyperthermia Neg Hx        Home Medications:  Prior to Admission medications    Medication Sig Start Date End Date Taking? Authorizing Provider   cyclobenzaprine (FLEXERIL) 5 MG tablet Take 1 tablet by mouth 3 (Three) Times a Day As Needed for Muscle Spasms. 12/19/24   Andrés Marcial MD   diclofenac (VOLTAREN) 50 MG EC tablet Take 1 tablet by mouth 2 (Two) Times a Day As Needed (pain). for pain 12/19/24   Andrés Marcial MD   ibuprofen (ADVIL,MOTRIN) 800 MG tablet Take 1 tablet by mouth. 11/14/24   Provider, MD Tianna   meclizine (Medi-Meclizine) 25 MG tablet Take 1 tablet by mouth 3 (Three) Times a Day As Needed for Dizziness. 6/9/25   Julia Sunshine APRN   ondansetron (ZOFRAN) 8 MG tablet Take 1 tablet by mouth Every 8 (Eight) Hours As Needed for Nausea or Vomiting. 6/9/25   Julia Sunshine APRN   QUEtiapine XR (SEROquel XR) 200 MG 24 hr tablet Take 1 tablet by mouth Every Night. 12/19/24   Andrés Marcial MD   sertraline (ZOLOFT) 100 MG tablet Take 1 tablet by mouth Daily. 12/19/24   Andrés Marcial MD   amoxicillin-clavulanate (AUGMENTIN) 875-125 MG per tablet Take 1 tablet by mouth 2 (Two) Times a Day for 7 days. 6/9/25 6/17/25  Julia Sunshine APRN        Social History:   Social History     Tobacco Use    Smoking status: Every Day     Current packs/day: 1.00     Average packs/day: 1 pack/day for 37.6 years (37.6 ttl pk-yrs)     Types: Cigarettes     Start date:  "11/19/1987     Passive exposure: Current    Smokeless tobacco: Never   Vaping Use    Vaping status: Never Used   Substance Use Topics    Alcohol use: Yes     Comment: occasionally    Drug use: Never         Review of Systems:  Review of Systems   Constitutional:  Negative for activity change, appetite change, chills, fatigue and fever.   HENT:  Negative for congestion, ear discharge, ear pain, nosebleeds, sinus pressure, sneezing, sore throat, tinnitus and voice change.    Eyes:  Negative for photophobia, pain and visual disturbance.   Respiratory:  Negative for cough, shortness of breath and wheezing.    Cardiovascular:  Negative for chest pain, palpitations and leg swelling.   Gastrointestinal:  Negative for abdominal pain, diarrhea, nausea and vomiting.   Endocrine: Negative for polyuria.   Genitourinary:  Negative for difficulty urinating, dysuria, flank pain and hematuria.   Musculoskeletal:  Positive for gait problem. Negative for arthralgias, back pain, joint swelling, myalgias, neck pain and neck stiffness.   Skin:  Negative for color change, rash and wound.   Allergic/Immunologic: Negative for immunocompromised state.   Neurological:  Positive for dizziness and speech difficulty (Slurred). Negative for tremors, seizures, syncope, facial asymmetry, weakness, light-headedness, numbness and headaches.   Hematological:  Negative for adenopathy. Does not bruise/bleed easily.   Psychiatric/Behavioral:  Negative for agitation, confusion and decreased concentration.         Physical Exam:  /81   Pulse 70   Temp 98.1 °F (36.7 °C) (Oral)   Resp 18   Ht 167.6 cm (66\")   Wt 68.5 kg (151 lb 0.2 oz)   SpO2 95%   Breastfeeding No   BMI 24.37 kg/m²     Physical Exam  Vitals and nursing note reviewed.   Constitutional:       General: She is not in acute distress.     Appearance: Normal appearance. She is not ill-appearing, toxic-appearing or diaphoretic.   HENT:      Head: Normocephalic and atraumatic.      " Right Ear: Tympanic membrane, ear canal and external ear normal.      Left Ear: Tympanic membrane, ear canal and external ear normal.      Nose: Nose normal.      Mouth/Throat:      Mouth: Mucous membranes are moist.      Pharynx: Oropharynx is clear.   Eyes:      Extraocular Movements: Extraocular movements intact.      Conjunctiva/sclera: Conjunctivae normal.      Pupils: Pupils are equal, round, and reactive to light.   Cardiovascular:      Rate and Rhythm: Normal rate and regular rhythm.      Pulses: Normal pulses.      Heart sounds: Normal heart sounds. No murmur heard.     No friction rub. No gallop.   Pulmonary:      Effort: Pulmonary effort is normal. No respiratory distress.      Breath sounds: Normal breath sounds. No wheezing, rhonchi or rales.   Abdominal:      General: There is no distension.      Palpations: Abdomen is soft.      Tenderness: There is no abdominal tenderness. There is no guarding or rebound.   Musculoskeletal:         General: Normal range of motion.      Cervical back: Normal range of motion and neck supple.      Right lower leg: No edema.      Left lower leg: No edema.   Skin:     General: Skin is warm and dry.      Capillary Refill: Capillary refill takes less than 2 seconds.   Neurological:      General: No focal deficit present.      Mental Status: She is alert and oriented to person, place, and time.      GCS: GCS eye subscore is 4. GCS verbal subscore is 5. GCS motor subscore is 6.      Cranial Nerves: Cranial nerves 2-12 are intact. No cranial nerve deficit.      Sensory: No sensory deficit.      Motor: No weakness.      Coordination: Romberg sign negative. Coordination normal. Finger-Nose-Finger Test normal.      Comments: No neurological deficit.  No slurred speech appreciated.  No facial drooping.  Normal coordination.  Negative Romberg.   Psychiatric:         Mood and Affect: Mood normal.         Behavior: Behavior normal.                    Medical Decision  Making:      Comorbidities that affect care:    Anxiety, depression    External Notes reviewed:    Previous Clinic Note: Seen in clinic today for dizziness, completed antibiotics, reported slurred speech and eligible handwriting.  Labs and stat CT were ordered.  Previous clinic note: Patient was seen on 6/9 at her PCP office for dizziness, reported sensation of imbalance which she felt like was intoxication.  On physical examination it was charted that patient's bilateral TMs were injected.  She was started on Augmentin, meclizine Zofran and Afrin.    The following orders were placed and all results were independently analyzed by me:  Orders Placed This Encounter   Procedures    XR Chest 1 View    CT Head Without Contrast    MRI Brain Without Contrast    [place] Draw    Comprehensive Metabolic Panel    Urinalysis With Culture If Indicated -    CBC Auto Differential    Urinalysis, Microscopic Only - Urine, Clean Catch    hCG, Quantitative, Pregnancy    TSH Rfx On Abnormal To Free T4    Magnesium    High Sensitivity Troponin T    High Sensitivity Troponin T 1Hr    Protime-INR    NPO Diet NPO Type: Strict NPO    Undress & Gown    Continuous Pulse Oximetry    Vital Signs    Orthostatic Blood Pressure    Orthostatic Vitals (Blood Pressure & Heart Rate)    IP General Consult (Use specialty-specific consult if known)    Inpatient Hospitalist Consult    PT Consult: Eval & Treat Functional Mobility Below Baseline    PT Plan of Care Cert / Re-Cert    Oxygen Therapy- Nasal Cannula; Titrate 1-6 LPM Per SpO2; 90 - 95%    POC Glucose Once    ECG 12 Lead ED Triage Standing Order; Weak / Dizzy / AMS    Insert Peripheral IV    Fall Precautions    CBC & Differential    Green Top (Gel)    Lavender Top    Gold Top - SST    Light Blue Top       Medications Given in the Emergency Department:  Medications   sodium chloride 0.9 % flush 10 mL (has no administration in time range)   sodium chloride 0.9 % bolus 1,000 mL (0 mL Intravenous  Stopped 6/17/25 1321)        ED Course:    ED Course as of 06/17/25 1755   Tue Jun 17, 2025   1445 Patient was assessed by physical therapist for evaluation of BPPV.  This testing was negative.  She also tested patient for vestibular neuritis which was negative. [AS]   1652 MRI Brain Without Contrast  Acute infarctions of cerebellum. [AS]   1746 Neurologist on-call states patient will need admission for stroke workup. [AS]      ED Course User Index  [AS] Deanna Tomlinson PA-C       Labs:    Lab Results (last 24 hours)       Procedure Component Value Units Date/Time    CBC & Differential [[id number]]  (Abnormal) Collected: 06/17/25 1105    Specimen: Blood from Arm, Right Updated: 06/17/25 1114    Narrative:      The following orders were created for panel order CBC & Differential.  Procedure                               Abnormality         Status                     ---------                               -----------         ------                     CBC Auto Differential[[id number]]        Abnormal            Final result                 Please view results for these tests on the individual orders.    Comprehensive Metabolic Panel [[id number]]  (Abnormal) Collected: 06/17/25 1105    Specimen: Blood from Arm, Right Updated: 06/17/25 1133     Glucose 142 mg/dL      BUN 17.7 mg/dL      Creatinine 1.14 mg/dL      Sodium 133 mmol/L      Potassium 4.9 mmol/L      Chloride 97 mmol/L      CO2 26.4 mmol/L      Calcium 9.6 mg/dL      Total Protein 7.4 g/dL      Albumin 4.7 g/dL      ALT (SGPT) 9 U/L      AST (SGOT) 24 U/L      Alkaline Phosphatase 99 U/L      Total Bilirubin 0.4 mg/dL      Globulin 2.7 gm/dL      A/G Ratio 1.7 g/dL      BUN/Creatinine Ratio 15.5     Anion Gap 9.6 mmol/L      eGFR 57.7 mL/min/1.73     Narrative:      GFR Categories in Chronic Kidney Disease (CKD)              GFR Category          GFR (mL/min/1.73)    Interpretation  G1                    90 or greater        Normal or high (1)  G2                     60-89                Mild decrease (1)  G3a                   45-59                Mild to moderate decrease  G3b                   30-44                Moderate to severe decrease  G4                    15-29                Severe decrease  G5                    14 or less           Kidney failure    (1)In the absence of evidence of kidney disease, neither GFR category G1 or G2 fulfill the criteria for CKD.    eGFR calculation 2021 CKD-EPI creatinine equation, which does not include race as a factor    CBC Auto Differential [[id number]]  (Abnormal) Collected: 06/17/25 1105    Specimen: Blood from Arm, Right Updated: 06/17/25 1114     WBC 8.73 10*3/mm3      RBC 5.32 10*6/mm3      Hemoglobin 17.1 g/dL      Hematocrit 50.8 %      MCV 95.5 fL      MCH 32.1 pg      MCHC 33.7 g/dL      RDW 13.2 %      RDW-SD 46.7 fl      MPV 9.7 fL      Platelets 304 10*3/mm3      Neutrophil % 59.0 %      Lymphocyte % 32.1 %      Monocyte % 5.5 %      Eosinophil % 2.1 %      Basophil % 1.1 %      Immature Grans % 0.2 %      Neutrophils, Absolute 5.15 10*3/mm3      Lymphocytes, Absolute 2.80 10*3/mm3      Monocytes, Absolute 0.48 10*3/mm3      Eosinophils, Absolute 0.18 10*3/mm3      Basophils, Absolute 0.10 10*3/mm3      Immature Grans, Absolute 0.02 10*3/mm3      nRBC 0.0 /100 WBC     hCG, Quantitative, Pregnancy [[id number]] Collected: 06/17/25 1105    Specimen: Blood from Arm, Right Updated: 06/17/25 1205     HCG Quantitative 3.06 mIU/mL     Narrative:      HCG Ranges by Gestational Age    Females - non-pregnant premenopausal   </= 1mIU/mL HCG  Females - postmenopausal               </= 7mIU/mL HCG    3 Weeks       5.4   -      72 mIU/mL  4 Weeks      10.2   -     708 mIU/mL  5 Weeks       217   -   8,245 mIU/mL  6 Weeks       152   -  32,177 mIU/mL  7 Weeks     4,059   - 153,767 mIU/mL  8 Weeks    31,366   - 149,094 mIU/mL  9 Weeks    59,109   - 135,901 mIU/mL  10 Weeks   44,186   - 170,409 mIU/mL  12 Weeks   27,107   -  201,615 mIU/mL  14 Weeks   24,302   -  93,646 mIU/mL  15 Weeks   12,540   -  69,747 mIU/mL  16 Weeks    8,904   -  55,332 mIU/mL  17 Weeks    8,240   -  51,793 mIU/mL  18 Weeks    9,649   -  55,271 mIU/mL      TSH Rfx On Abnormal To Free T4 [[id number]]  (Normal) Collected: 06/17/25 1105    Specimen: Blood from Arm, Right Updated: 06/17/25 1214     TSH 0.913 uIU/mL     Magnesium [[id number]]  (Normal) Collected: 06/17/25 1105    Specimen: Blood from Arm, Right Updated: 06/17/25 1158     Magnesium 2.5 mg/dL     High Sensitivity Troponin T [[id number]]  (Normal) Collected: 06/17/25 1105    Specimen: Blood from Arm, Right Updated: 06/17/25 1227     HS Troponin T <6 ng/L     Narrative:      High Sensitive Troponin T Reference Range:  <14.0 ng/L- Negative Female for AMI  <22.0 ng/L- Negative Male for AMI  >=14 - Abnormal Female indicating possible myocardial injury.  >=22 - Abnormal Male indicating possible myocardial injury.   Clinicians would have to utilize clinical acumen, EKG, Troponin, and serial changes to determine if it is an Acute Myocardial Infarction or myocardial injury due to an underlying chronic condition.         Urinalysis With Culture If Indicated - Urine, Clean Catch [[id number]]  (Abnormal) Collected: 06/17/25 1108    Specimen: Urine, Clean Catch Updated: 06/17/25 1144     Color, UA Dark Yellow     Appearance, UA Turbid     pH, UA 6.0     Specific Gravity, UA 1.030     Glucose, UA Negative     Ketones, UA Trace     Bilirubin, UA Negative     Blood, UA Negative     Protein, UA Negative     Leuk Esterase, UA Small (1+)     Nitrite, UA Negative     Urobilinogen, UA 1.0 E.U./dL    Narrative:      In absence of clinical symptoms, the presence of pyuria, bacteria, and/or nitrites on the urinalysis result does not correlate with infection.    Urinalysis, Microscopic Only - Urine, Clean Catch [[id number]]  (Abnormal) Collected: 06/17/25 1108    Specimen: Urine, Clean Catch Updated: 06/17/25 1150     RBC, UA 0-2  /HPF      WBC, UA 0-2 /HPF      Comment: Urine culture not indicated.        Bacteria, UA Trace /HPF      Squamous Epithelial Cells, UA 3-6 /HPF      Hyaline Casts, UA None Seen /LPF      Uric Acid Crystals, UA Large/3+ /HPF      Methodology Manual Light Microscopy    High Sensitivity Troponin T 1Hr [[id number]] Collected: 06/17/25 1248    Specimen: Blood Updated: 06/17/25 1314     HS Troponin T <6 ng/L      Troponin T Numeric Delta --     Comment: Unable to calculate.       Narrative:      High Sensitive Troponin T Reference Range:  <14.0 ng/L- Negative Female for AMI  <22.0 ng/L- Negative Male for AMI  >=14 - Abnormal Female indicating possible myocardial injury.  >=22 - Abnormal Male indicating possible myocardial injury.   Clinicians would have to utilize clinical acumen, EKG, Troponin, and serial changes to determine if it is an Acute Myocardial Infarction or myocardial injury due to an underlying chronic condition.                  Imaging:    MRI Brain Without Contrast  Result Date: 6/17/2025  MRI BRAIN WO CONTRAST Date of Exam: 6/17/2025 3:45 PM EDT Indication: hypoattenuation of cerebrellum, dizzy, reported slurred speech.  Comparison: CT head from earlier today and MRI brain from August 12, 2021 Technique:  Routine multiplanar/multisequence sequence images of the brain were obtained without contrast administration. Findings: There are a couple acute infarctions involving the right cerebellum. There is no hemorrhagic transformation. The ventricles are stable in caliber, with no midline shift. The basal cisterns appear patent. Subtle foci of periventricular and subcortical white matter FLAIR hyperintensities are nonspecific, but likely the sequela of mild chronic small vessel ischemic disease. The midline structures appear intact. The globes and orbits appear intact. The intracranial vascular flow-voids appear patent. There is a partial right mastoid effusion.     Impression: 1.Acute infarctions involving  right cerebellum. No hemorrhagic transformation or significant mass effect. 2.Findings suggestive of mild chronic small vessel ischemic disease. 3.Partial right mastoid effusion. Electronically Signed: Enrrique Blanchard MD  6/17/2025 4:13 PM EDT  Workstation ID: [id number]    XR Chest 1 View  Result Date: 6/17/2025  XR CHEST 1 VW Date of Exam: 6/17/2025 12:26 PM EDT Indication: weak dizzy Comparison: Chest CT 3/12/2025, chest radiograph 11/20/2020. Findings: Cardiomediastinal silhouette is within normal limits. No focal consolidation. No pleural effusion or pneumothorax. Osseous structures are unremarkable.     Impression: No acute cardiopulmonary findings. Electronically Signed: Man Suarez MD  6/17/2025 12:50 PM EDT  Workstation ID: [id number]    CT Head Without Contrast  Result Date: 6/17/2025  CT HEAD WO CONTRAST Date of Exam: 6/17/2025 12:08 PM EDT Indication: dizzy x 1 week. Comparison: Brain MRI 8/12/2021. Technique: Axial CT images were obtained of the head without contrast administration.  Reconstructed coronal and sagittal images were also obtained. Automated exposure control and iterative construction methods were used. Findings: Small linear area of hypoattenuation in the right cerebellum without distinct correlate on prior brain MRI. No evidence of acute intracranial hemorrhage or mass effect. No extra-axial collection. Ventricles and sulci are symmetric. The mastoid air cells and paranasal sinuses are well aerated. Globes and extraocular muscles are unremarkable. No acute or suspicious osseous abnormality. Soft tissues within normal limits.     Impression: Small linear area of hypoattenuation within the right cerebellum, not present on prior brain MRI but without acute features by CT and may represent sequela of prior insult. No acute intracranial findings otherwise. Brain MRI could be considered for further evaluation if felt to be clinically indicated. Electronically Signed: Man Suaerz MD  6/17/2025  12:49 PM EDT  Workstation ID: [id number]        Differential Diagnosis and Discussion:    Dizziness: Based on the patient's history, signs, and symptoms, the diffential diagnosis includes but is not limited to meningitis, stroke, sepsis, subarachnoid hemorrhage, intracranial bleeding, encephalitis, vertigo, electrolyte imbalance, and metabolic disorders.    PROCEDURES:    Labs were collected in the emergency department and all labs were reviewed and interpreted by me.  X-ray were performed in the emergency department and all X-ray impressions were independently interpreted by me.  CT scan was performed in the emergency department and the CT scan radiology impression was interpreted by me.  MRI was performed in the emergency department and the MRI impression was interpreted by me.     ECG 12 Lead ED Triage Standing Order; Weak / Dizzy / AMS   Preliminary Result   HEART RATE=69  bpm   RR Huqvfuxp=778  ms   SC Wamwusyx=985  ms   P Horizontal Axis=-1  deg   P Front Axis=59  deg   QRSD Interval=97  ms   QT Jradecue=648  ms   RPsI=254  ms   QRS Axis=29  deg   T Wave Axis=26  deg   - ABNORMAL ECG -   Sinus rhythm   Inferior infarct, old   Date and Time of Study:2025-06-17 11:13:01          Procedures    MDM  Number of Diagnoses or Management Options  Cerebellar stroke, acute  Dizziness  Diagnosis management comments: This is a 54-year-old female with history of anxiety and depression, current cigarette smoker presenting to the ED with around 1 weeks worth of dizziness.  She also reports slurred speech and difficulty with ambulation because of the dizziness.  Also having leg cramps.  Was seen 1 week ago when symptoms started by her PCP and was started on Augmentin for an ear infection.  Patient has not had any ear pain or drainage during this episode.  On my physical examination there is no evidence of otitis media.  Neurological exam does not reveal any focal deficits, and I do not appreciate any slurred speech on my exam.   I did have her evaluated by our physical therapist for concern of BPPV or vestibular neuritis, these preliminary testing were negative through PT.  Laboratory evaluation reveals mild concentration of RBCs, Hgb and HCT mildly elevated.  CMP was evaluated and does reveal mild elevation of creatinine with mild hyponatremia of 133, I did order patient 1 L normal saline.  Urine unremarkable.  Troponin negative.  CT head was obtained, there was a finding of a linear area of hypoattenuation in the cerebellum with recommendation for MRI.  On my reassessment of this patient she continues to have symptoms in which she describes dizziness and slurred speech.  I again do not appreciate any neurological deficit but MRI was ordered without contrast.  Per radiologist there are areas of acute infarcts within patient's right cerebellum.  I discussed this with patient.  I have placed a stat consult for neurology.  Blood pressure has remained appropriate, 130s to 140s systolic over 80s.  Teleneurology consulted on this patient and recommend admission for stroke workup.  Hospitalist consulted for admission.       Amount and/or Complexity of Data Reviewed  Clinical lab tests: reviewed and ordered  Tests in the radiology section of CPT®: reviewed and ordered  Tests in the medicine section of CPT®: reviewed and ordered  Obtain history from someone other than the patient: yes  Review and summarize past medical records: yes  Discuss the patient with other providers: yes  Independent visualization of images, tracings, or specimens: yes    Risk of Complications, Morbidity, and/or Mortality  Presenting problems: moderate  Diagnostic procedures: high  Management options: moderate    Patient Progress  Patient progress: stable             The patient presents with symptoms concerning for a stroke. The patient was evaluated by me immediately upon arrival. Extensive history and physical was obtained. Family present to give further insight into  patients onset of symptoms.  CT scan findings were discussed with radiology. The case was also discussed at length with telehealth neurology. Nursing staff was instructed on how to proceed with patient care. Patient was then placed on the cardiac monitor and monitored for arrhythmia that could be contributing to stroke like symptoms. EKG was performed and evaluated by me. Patient's blood pressure was monitored and controled consistent with stroke guidelines. Treatment option's for patient's symptoms include admission for stroke workup, start aspirin after inclusion and exclusion criteria was weighed. The patient's mental status and neurological symptoms were monitored throughout their stay for worsening decline.     Total Critical Care time of 35 minutes. Total critical care time documented does not include time spent on separately billed procedures for services of nurses or physician assistants. I personally saw and examined the patient. I have reviewed all diagnostic interpretations and treatment plans as written. I was present for the key portions of any procedures performed and the inclusive time noted in any critical care statement. Critical care time includes patient management by me, time spent at the patients bedside,  time to review lab and imaging results, discussing patient care, documentation in the medical record, and time spent with family or caregiver.          Patient Care Considerations:    SEPSIS was considered but is NOT present in the emergency department as SIRS criteria is not present.      Consultants/Shared Management Plan:    Hospitalist: I have discussed the case with Dr. Hare who agrees to accept the patient for admission.  Consultant: I have discussed the case with teleneurology who states recommend admission for stroke workup.    Social Determinants of Health:    Patient is independent, reliable, and has access to care.       Disposition and Care Coordination:    Admit:   Through  independent evaluation of the patient's history, physical, and imperical data, the patient meets criteria for inpatient admission to the hospital.        Final diagnoses:   Dizziness   Cerebellar stroke, acute        ED Disposition       ED Disposition   Intended Admit    Condition   --    Comment   --               This medical record created using voice recognition software.             Deanan Tomlinson, DAY  06/17/25 1755

## 2025-06-17 NOTE — H&P
[facility]  Date: 2025   Patient Name: Rosie Medrano  : 1970  MRN: [id number]  Primary Care Physician:  Andrés Marcial MD  Date of admission: 2025    Subjective   Subjective     Chief Complaint: Dizziness    HPI:    Rosie Medrano is a 54 y.o. female significant past medical history anxiety, depression, GERD presented to the ED complaining of dizziness for a week.  Patient initially went to her PCP due to dizziness about a week ago when she was diagnosed with ear infection and was started on Augmentin, symptoms did not improve and she went back to her PCP who was planning on ordering a CT scan of the brain, however patient decided come to the ER instead,.  The ER MRI of the brain was obtained revealing acute infarcts in the cerebellum.  Teleneurology was consulted, recommending admission for stroke workup.      Personal History     Past Medical History:  Past Medical History:   Diagnosis Date    Anxiety     Clotting disorder 2023    Depression     GERD (gastroesophageal reflux disease)     Gross hematuria     Kidney stone     Muscle spasm     Ureteral stone     Urinary tract infection 2023    Blood in urine       Past Surgical History:  Past Surgical History:   Procedure Laterality Date     SECTION      ENDOMETRIAL ABLATION      INGUINAL HERNIA REPAIR      LAPAROSCOPIC TUBAL LIGATION      TUBAL ABDOMINAL LIGATION  05    URETEROSCOPY LASER LITHOTRIPSY WITH STENT INSERTION Right 2023    Procedure: CYSTOSCOPY right URETEROSCOPY  HOLMIUM LASER STENT INSERTION, BILATERAL RETROGRADE;  Surgeon: Samra Mac MD;  Location: [facility] OR;  Service: Urology;  Laterality: Right;       Family History:   Family History   Problem Relation Age of Onset    Hypertension Mother     Depression Mother     Diabetes Mother     Anxiety disorder Mother     Kidney disease Mother     Multiple sclerosis Sister     No Known Problems  Daughter     No Known Problems Son     No Known Problems Son     Arthritis Other     COPD Other     Diabetes type II Other     Malig Hyperthermia Neg Hx        Social History:   Social History     Socioeconomic History    Marital status:    Tobacco Use    Smoking status: Every Day     Current packs/day: 1.00     Average packs/day: 1 pack/day for 37.6 years (37.6 ttl pk-yrs)     Types: Cigarettes     Start date: 11/19/1987     Passive exposure: Current    Smokeless tobacco: Never   Vaping Use    Vaping status: Never Used   Substance and Sexual Activity    Alcohol use: Yes     Comment: occasionally    Drug use: Never    Sexual activity: Defer       Home Medications:  QUEtiapine XR, cyclobenzaprine, diclofenac, ibuprofen, meclizine, ondansetron, and sertraline    Allergies:  No Known Allergies    Review of Systems   All systems were reviewed and negative except for: As indicated in HPI otherwise negative    Objective   Objective     Vitals:   Temp:  [98 °F (36.7 °C)-98.1 °F (36.7 °C)] 98.1 °F (36.7 °C)  Heart Rate:  [] 70  Resp:  [18] 18  BP: (116-145)/(57-88) 135/81    Physical Exam    Constitutional: Awake, alert, no acute distress   Eyes: Pupils equal, sclerae anicteric, no conjunctival injection   HENT: NCAT, mucous membranes moist   Neck: Supple, no thyromegaly, no lymphadenopathy, trachea midline   Respiratory: Clear to auscultation bilaterally, nonlabored respirations    Cardiovascular: RRR, no murmurs, rubs, or gallops, palpable pedal pulses bilaterally   Gastrointestinal: Positive bowel sounds, soft, nontender, nondistended   Musculoskeletal: No bilateral ankle edema, no clubbing or cyanosis to extremities   Psychiatric: Appropriate affect, cooperative   Neurologic: Oriented x 3, strength symmetric in all extremities, Cranial Nerves grossly intact to confrontation, speech clear   Skin: No rashes     Result Review    Result Review:  I have personally reviewed the results from the time of this  admission to 6/17/2025 17:57 EDT and agree with these findings:  [x]  Laboratory  [x]  Microbiology  [x]  Radiology  [x]  EKG/Telemetry   [x]  Cardiology/Vascular   []  Pathology  []  Old records  []  Other:      Assessment & Plan   Assessment / Plan     Assessment/Plan:   Acute infarctions involving right cerebellum   - Admit to telemetry  - Obtain echocardiogram  - Teleneurology consulted  - Neurochecks  - Physical therapy  - Occupational Therapy  - Monitor telemetry  - Obtain bilateral carotid ultrasound  - Lipid panel  - Hemoglobin A1c  - Start patient on aspirin 81 mg, awaiting further recommendation from teleneurology for possible DAPT therapy.    Chronic conditions:  Anxiety  Depression  GERD  -Continue home meds for chronic conditions and physical.      VTE Prophylaxis:  Pharmacologic VTE prophylaxis orders are signed & held.          CODE STATUS:    Code Status (Patient has no pulse and is not breathing): CPR (Attempt to Resuscitate)  Medical Interventions (Patient has pulse or is breathing): Full Support  Level Of Support Discussed With: Patient      Admission Status:  I believe this patient meets inpatient status.    Electronically signed by Alex Hare MD, 06/17/25, 5:57 PM EDT.

## 2025-06-17 NOTE — PROGRESS NOTES
Venipuncture Blood Specimen Collection  Venipuncture performed in left arm  by Kristin Malcolm with good hemostasis. Patient tolerated the procedure well without complications.   06/17/25   Kristin Malcolm

## 2025-06-17 NOTE — PROGRESS NOTES
"Chief Complaint  Speech Problem (Slurring speech last monday), Dizziness (Still dizzy), and Spasms (Vladislav horses in the right lower leg)    The PHQ has not been completed during this encounter.         History of Present Illness:  Rosie Medrano is a 54 y.o. female who presents to [facility] with a past medical history of  Past Medical History:   Diagnosis Date    Anxiety     Clotting disorder April 2023    Depression     GERD (gastroesophageal reflux disease)     Gross hematuria     Kidney stone     Muscle spasm     Ureteral stone     Urinary tract infection April 2023    Blood in urine        History of Present Illness  The patient is a 54-year-old female who presents today for continued complaints of dizziness.    She reports persistent dizziness, which has not improved with the use of meclizine and antibiotics. She also experiences occasional anxiety and dry mouth. She has completed her course of antibiotics. She does not experience any ear pain, respiratory distress, or urinary issues. She has not experienced any falls or episodes of loss of consciousness. She does not have any vision changes, facial drooping, chest pain, or swelling. She has been able to drive herself to work.    She has been experiencing leg cramps and difficulty with word recall for the past few days. She does not report any numbness or tingling sensations. She reports slurred speech and illegible handwriting.    She initially denies wanting to go to the ER.      Objective   Vital Signs:   Vitals:    06/17/25 0849 06/17/25 0934 06/17/25 0935   BP: 116/70 124/82 124/88   Patient Position:  Sitting Standing   Pulse: 110 80 89   Temp: 98 °F (36.7 °C)     SpO2: 98%     Weight: 68 kg (150 lb)     Height: 167.6 cm (66\")       Body mass index is 24.21 kg/m².    Wt Readings from Last 3 Encounters:   06/17/25 68.5 kg (151 lb 0.2 oz)   06/17/25 68 kg (150 lb)   06/09/25 70.8 kg (156 lb)     BP Readings from Last 3 " Encounters:   06/17/25 151/72   06/17/25 124/88   06/09/25 142/90       Health Maintenance   Topic Date Due    Pneumococcal Vaccine 50+ (1 of 2 - PCV) Never done    PAP SMEAR  Never done    HEPATITIS C SCREENING  Never done    ZOSTER VACCINE (1 of 2) Never done    ANNUAL PHYSICAL  02/28/2025    COVID-19 Vaccine (1 - 2024-25 season) 06/17/2026 (Originally 9/1/2024)    INFLUENZA VACCINE  07/01/2025    LIPID PANEL  08/06/2025    LUNG CANCER SCREENING  03/12/2026    MAMMOGRAM  03/12/2027    COLORECTAL CANCER SCREENING  08/18/2027    TDAP/TD VACCINES (2 - Td or Tdap) 08/06/2034       Review of Systems   Physical Exam  Vitals reviewed.   Constitutional:       Appearance: Normal appearance.   HENT:      Right Ear: Tympanic membrane normal.      Left Ear: Tympanic membrane normal.      Nose: Nose normal.      Mouth/Throat:      Mouth: Mucous membranes are moist.   Eyes:      Pupils: Pupils are equal, round, and reactive to light.   Cardiovascular:      Rate and Rhythm: Normal rate and regular rhythm.   Pulmonary:      Effort: Pulmonary effort is normal.      Breath sounds: Normal breath sounds.   Skin:     General: Skin is warm and dry.   Neurological:      General: No focal deficit present.      Mental Status: She is alert and oriented to person, place, and time.      GCS: GCS eye subscore is 4. GCS verbal subscore is 5. GCS motor subscore is 6.      Motor: Motor function is intact.      Coordination: Coordination abnormal.      Gait: Gait abnormal.   Psychiatric:         Mood and Affect: Mood normal.            Result Review :  The following data was reviewed by: RAYMOND Chong on 06/17/2025:  Admission on 06/17/2025   Component Date Value    QT Interval 06/17/2025 429     QTC Interval 06/17/2025 459     Glucose 06/17/2025 142 (H)     BUN 06/17/2025 17.7     Creatinine 06/17/2025 1.14 (H)     Sodium 06/17/2025 133 (L)     Potassium 06/17/2025 4.9     Chloride 06/17/2025 97 (L)     CO2 06/17/2025 26.4     Calcium  06/17/2025 9.6     Total Protein 06/17/2025 7.4     Albumin 06/17/2025 4.7     ALT (SGPT) 06/17/2025 9     AST (SGOT) 06/17/2025 24     Alkaline Phosphatase 06/17/2025 99     Total Bilirubin 06/17/2025 0.4     Globulin 06/17/2025 2.7     A/G Ratio 06/17/2025 1.7     BUN/Creatinine Ratio 06/17/2025 15.5     Anion Gap 06/17/2025 9.6     eGFR 06/17/2025 57.7 (L)     Color, UA 06/17/2025 Dark Yellow (A)     Appearance, UA 06/17/2025 Turbid (A)     pH, UA 06/17/2025 6.0     Specific Gravity, UA 06/17/2025 1.030     Glucose, UA 06/17/2025 Negative     Ketones, UA 06/17/2025 Trace (A)     Bilirubin, UA 06/17/2025 Negative     Blood, UA 06/17/2025 Negative     Protein, UA 06/17/2025 Negative     Leuk Esterase, UA 06/17/2025 Small (1+) (A)     Nitrite, UA 06/17/2025 Negative     Urobilinogen, UA 06/17/2025 1.0 E.U./dL     Extra Tube 06/17/2025 Hold for add-ons.     Extra Tube 06/17/2025 hold for add-on     Extra Tube 06/17/2025 Hold for add-ons.     Extra Tube 06/17/2025 Hold for add-ons.     WBC 06/17/2025 8.73     RBC 06/17/2025 5.32 (H)     Hemoglobin 06/17/2025 17.1 (H)     Hematocrit 06/17/2025 50.8 (H)     MCV 06/17/2025 95.5     MCH 06/17/2025 32.1     MCHC 06/17/2025 33.7     RDW 06/17/2025 13.2     RDW-SD 06/17/2025 46.7     MPV 06/17/2025 9.7     Platelets 06/17/2025 304     Neutrophil % 06/17/2025 59.0     Lymphocyte % 06/17/2025 32.1     Monocyte % 06/17/2025 5.5     Eosinophil % 06/17/2025 2.1     Basophil % 06/17/2025 1.1     Immature Grans % 06/17/2025 0.2     Neutrophils, Absolute 06/17/2025 5.15     Lymphocytes, Absolute 06/17/2025 2.80     Monocytes, Absolute 06/17/2025 0.48     Eosinophils, Absolute 06/17/2025 0.18     Basophils, Absolute 06/17/2025 0.10     Immature Grans, Absolute 06/17/2025 0.02     nRBC 06/17/2025 0.0     RBC, UA 06/17/2025 0-2     WBC, UA 06/17/2025 0-2     Bacteria, UA 06/17/2025 Trace (A)     Squamous Epithelial Cell* 06/17/2025 3-6 (A)     Hyaline Casts, UA 06/17/2025 None Seen      Uric Acid Crystals, UA 06/17/2025 Large/3+     Methodology 06/17/2025 Manual Light Microscopy     HCG Quantitative 06/17/2025 3.06     TSH 06/17/2025 0.913     Magnesium 06/17/2025 2.5     HS Troponin T 06/17/2025 <6     HS Troponin T 06/17/2025 <6     Troponin T Numeric Delta 06/17/2025      Protime 06/17/2025 13.7     INR 06/17/2025 1.01        Results          ECG 12 Lead    Date/Time: 6/17/2025 6:31 PM  Performed by: Julia Sunshine APRN    Authorized by: Julia Sunshine APRN  Comparison: not compared with previous ECG   Previous ECG: no previous ECG available  Rhythm: sinus rhythm  Rate: normal  BPM: 76  Q waves: II      Clinical impression: abnormal EKG  Comments: Abnormal EKG.  Possible previous MI, Q wave present.              Assessment and Plan   Diagnoses and all orders for this visit:    1. Dizziness (Primary)  -     CT Head With & Without Contrast  -     ECG 12 Lead    2. Aphasia  -     CT Head With & Without Contrast  -     Cancel: D-dimer, Quantitative  -     Cancel: Magnesium  -     Cancel: CBC Auto Differential  -     Cancel: Comprehensive Metabolic Panel  -     Cancel: Vitamin D,25-Hydroxy  -     Cancel: Vitamin B12 & Folate  -     Cancel: Iron Profile w/o Ferritin  -     Cancel: Lipid Panel  -     Cancel: Hemoglobin A1c  -     Cancel: Hepatitis C Antibody  -     Cancel: Ferritin  -     Cancel: TSH+Free T4  -     Cancel: Urinalysis With Culture If Indicated - Urine, Clean Catch    3. Dry mouth    4. Leg cramping        Assessment & Plan    Tympanic membranes have improved.  Patient is experiencing some patient in office.  Negative Homans' sign.      EKG was ordered before lab work, after reviewing EKG and consulting with Dr. Jp Grove, it was decided that patient needed to go to the emergency room.  Patient was agreeable to go to the emergency room, but was going to drive herself home and have her mom take her.  Although, we advised to go straight to the ER she insisted that she would be fine to  drive.    Concerns for stroke, old MI, possible blood clot.    1. Dizziness.  - Persistent dizziness with associated symptoms of slurred speech and difficulty with handwriting.  - Physical exam reveals no facial drooping, chest pain, or swelling.  - Stat CT scan of the head and blood work ordered to investigate the cause.  - If CT scan cannot be performed , referral to the hospital for further workup.    2. Leg cramping.  - Reports experiencing cramping in the calf.  - No associated numbness, tingling, or swelling.  - Blood work will be conducted to check for any abnormalities.  - Imaging of the leg may be considered based on blood work results.      BMI is within normal parameters. No other follow-up for BMI required.      45 minutes were spent caring for Rosie on this date of service. This time spent by me includes preparing for the visit, reviewing tests, obtaining/reviewing separately obtained history, performing medically appropriate exam/evaluation, counseling/educating the patient/family/caregiver, ordering medications/tests/procedures, referring/communicating with other health care professionals, documenting information in the medical record, independently interpreting results and communicating that with the patient/family/caregiver and/or care coordination.     FOLLOW UP  Return if symptoms worsen or fail to improve.    Patient was given instructions and counseling regarding her condition or for health maintenance advice. Please see specific information pulled into the AVS if appropriate.       RAYMOND Chong  06/17/25  18:34 EDT    CURRENT & DISCONTINUED MEDICATIONS  Current Outpatient Medications   Medication Instructions    meclizine (MEDI-MECLIZINE) 25 mg, Oral, 3 Times Daily PRN    ondansetron (ZOFRAN) 8 mg, Oral, Every 8 Hours PRN    QUEtiapine XR (SEROQUEL XR) 200 mg, Oral, Nightly    sertraline (ZOLOFT) 100 mg, Oral, Daily       Medications Discontinued During This Encounter   Medication  Reason    amoxicillin-clavulanate (AUGMENTIN) 875-125 MG per tablet *Therapy completed        EMR Dragon/Transcription disclaimer:  Parts of this encounter note are electronic transcription/translation of spoken language to printed text.     Patient or patient representative verbalized consent for the use of Ambient Listening during the visit with  RAYMOND Chong for chart documentation. 6/17/2025  09:14 EDT

## 2025-06-17 NOTE — CASE MANAGEMENT/SOCIAL WORK
Discharge Planning Assessment   [place]     Patient Name: Rosie Medrano  MRN: [id number]  Today's Date: 6/17/2025    Admit Date: 6/17/2025        Discharge Needs Assessment       Row Name 06/17/25 1928       Living Environment    People in Home spouse    Name(s) of People in Home Enrrique Medrano, spouse and PT's mother Judy Snellen    Current Living Arrangements home    Potentially Unsafe Housing Conditions none    In the past 12 months has the electric, gas, oil, or water company threatened to shut off services in your home? No    Primary Care Provided by self    Provides Primary Care For no one    Family Caregiver if Needed spouse    Family Caregiver Names Enrrique Medrano    Quality of Family Relationships supportive;helpful    Able to Return to Prior Arrangements yes       Resource/Environmental Concerns    Resource/Environmental Concerns none    Transportation Concerns none       Transportation Needs    In the past 12 months, has lack of transportation kept you from medical appointments or from getting medications? no    In the past 12 months, has lack of transportation kept you from meetings, work, or from getting things needed for daily living? No       Food Insecurity    Within the past 12 months, you worried that your food would run out before you got the money to buy more. Never true    Within the past 12 months, the food you bought just didn't last and you didn't have money to get more. Never true       Transition Planning    Patient/Family Anticipates Transition to home;home with family    Patient/Family Anticipated Services at Transition none    Transportation Anticipated car, drives self       Discharge Needs Assessment    Readmission Within the Last 30 Days no previous admission in last 30 days    Equipment Currently Used at Home none    Concerns to be Addressed no discharge needs identified    Do you want help finding or keeping work or a job? I do not need or want help    Do you want help with school or  training? For example, starting or completing job training or getting a high school diploma, GED or equivalent No    Anticipated Changes Related to Illness none                   Discharge Plan    No documentation.                      Demographic Summary       Row Name 06/17/25 1926       General Information    Admission Type inpatient    Arrived From home    Referral Source emergency department    Reason for Consult discharge planning    Preferred Language English                   Functional Status       Row Name 06/17/25 1926       Functional Status    Usual Activity Tolerance good    Current Activity Tolerance good       Physical Activity    On average, how many days per week do you engage in moderate to strenuous exercise (like a brisk walk)? 6 days    On average, how many minutes do you engage in exercise at this level? 120 min    Number of minutes of exercise per week 720       Functional Status, IADL    Medications independent    Meal Preparation independent    Housekeeping independent    Laundry independent    Shopping independent    If for any reason you need help with day-to-day activities such as bathing, preparing meals, shopping, managing finances, etc., do you get the help you need? I don't need any help       Mental Status    General Appearance WDL WDL       Mental Status Summary    Recent Changes in Mental Status/Cognitive Functioning no changes       Employment/    Employment Status unemployed;employed full-time    Current or Previous Occupation traveling/driving                   Psychosocial       Row Name 06/17/25 1927       Mental Health    Little interest or pleasure in doing things Not at all    Feeling down, depressed, or hopeless Not at all       Stress    Do you feel stress - tense, restless, nervous, or anxious, or unable to sleep at night because your mind is troubled all the time - these days? Only a littl       Coping/Stress    Major Change/Loss/Stressor medical  condition/diagnosis    Patient Personal Strengths positive attitude;strong support system    Sources of Support spouse                   Abuse/Neglect       Row Name 06/17/25 1928       Personal Safety    Feels Unsafe at Home or Work/School no    Feels Threatened by Someone no    Does Anyone Try to Keep You From Having Contact with Others or Doing Things Outside Your Home? no    Physical Signs of Abuse Present no                   Legal       Row Name 06/17/25 1928       Financial Resource Strain    How hard is it for you to pay for the very basics like food, housing, medical care, and heating? Not hard       Financial/Legal    Source of Income salary/wages    Financial/Environmental Concerns none                   Substance Abuse    No documentation.                  Patient Forms    No documentation.                 SW met with Pt at bedside to complete discharge needs assessment. Pt resides at home with her spouse Enrrique. Pt meets all ADL's independently and no assistive devices. Pt intends to discharge home when ready.     AKBAR Chinchilla

## 2025-06-17 NOTE — THERAPY EVALUATION
Patient Name: Rosie Medrano  : 1970    MRN: [id number]                              Today's Date: 2025       Admit Date: 2025    Visit Dx:     ICD-10-CM ICD-9-CM   1. Dizziness  R42 780.4     Patient Active Problem List   Diagnosis    Depression    Major depressive disorder, single episode, unspecified    Arthritis    Primary hypertension    Migraine without status migrainosus, not intractable    Right ureteral stone    Gross hematuria    Mixed hyperlipidemia     Past Medical History:   Diagnosis Date    Anxiety     Clotting disorder 2023    Depression     GERD (gastroesophageal reflux disease)     Gross hematuria     Kidney stone     Muscle spasm     Ureteral stone     Urinary tract infection 2023    Blood in urine     Past Surgical History:   Procedure Laterality Date     SECTION      ENDOMETRIAL ABLATION      INGUINAL HERNIA REPAIR      LAPAROSCOPIC TUBAL LIGATION      TUBAL ABDOMINAL LIGATION  05    URETEROSCOPY LASER LITHOTRIPSY WITH STENT INSERTION Right 2023    Procedure: CYSTOSCOPY right URETEROSCOPY  HOLMIUM LASER STENT INSERTION, BILATERAL RETROGRADE;  Surgeon: Samra Mac MD;  Location: [facility];  Service: Urology;  Laterality: Right;      General Information       Row Name 25 1451          Physical Therapy Time and Intention    Document Type evaluation  -LR     Mode of Treatment individual therapy  -LR       Row Name 25 1451          General Information    Patient Profile Reviewed yes  -LR     Prior Level of Function independent:  -LR               User Key  (r) = Recorded By, (t) = Taken By, (c) = Cosigned By      Initials Name Provider Type    LR Josie Mae, PT Physical Therapist                  History: Pt reports last week she started having dizziness and went to the doctor and was diagnosed with an ear infection.  She took her antibiotics but has continued to have dizziness.  She has had nausea and vomiting as well.   She also reports intermittent slurring of speech.  Dizziness is worse with movement and she feels off balance.  She denies a history of vertigo.  She denies any vision issues.    Objective:    Special Tests:  L gato halpike: negative  R gato halpike: negative  L horizontal canal test: negative  R horizontal canal test: negative    Smooth pursuits horizontal: slight dizziness and nystagmus  Smooth pursuits vertical: slight dizziness but WNL  Saccades horizontal: WNL  Saccades vertical: WNL  VOR horizontal: WNL  VOR vertical: WNL  Head thrust test: negative B  Head shaking nystagmus test: negative    Assessment/Plan:   Pt presents with a diagnosis of dizziness however testing for BPPV and vestibular neuritis are all negative today.  Pt will benefit from further workup to rule in/out cause of dizziness.   Outcome Measures       Row Name 06/17/25 1451          Optimal Instrument    Optimal Instrument Optimal - 3  -LR     Standing 2  -LR     Walking - short distance 2  -LR     Walking - long distance 2  -LR     From the list, choose the 3 activities you would most like to be able to do without any difficulty Standing;Walking -short distance;Walking -long distance  -LR     Total Score Optimal - 3 6  -LR       Row Name 06/17/25 1451          Functional Assessment    Outcome Measure Options Optimal Instrument  -LR               User Key  (r) = Recorded By, (t) = Taken By, (c) = Cosigned By      Initials Name Provider Type    Josie Antonio, PT Physical Therapist                     Time Calculation:   PT Evaluation Complexity  History, PT Evaluation Complexity: 3 or more personal factors and/or comorbidities  Examination of Body Systems (PT Eval Complexity): 1-2 elements  Clinical Presentation (PT Evaluation Complexity): stable  Clinical Decision Making (PT Evaluation Complexity): low complexity  Overall Complexity (PT Evaluation Complexity): low complexity     PT Charges       Row Name 06/17/25 1457             Time  Calculation    PT Received On 06/17/25  -LR         Untimed Charges    PT Eval/Re-eval Minutes 20  -LR         Total Minutes    Untimed Charges Total Minutes 20  -LR       Total Minutes 20  -LR                User Key  (r) = Recorded By, (t) = Taken By, (c) = Cosigned By      Initials Name Provider Type    LR Josie Mae, PT Physical Therapist                  Therapy Charges for Today       Code Description Service Date Service Provider Modifiers Qty    86186632653 HC PT EVAL LOW COMPLEXITY 2 6/17/2025 Josie Mae, PT GP 1            PT G-Codes  Outcome Measure Options: Optimal Instrument       Josie Mae, PT  6/17/2025

## 2025-06-18 ENCOUNTER — APPOINTMENT (OUTPATIENT)
Dept: CARDIOLOGY | Facility: HOSPITAL | Age: 55
DRG: 066 | End: 2025-06-18
Payer: COMMERCIAL

## 2025-06-18 ENCOUNTER — APPOINTMENT (OUTPATIENT)
Dept: CT IMAGING | Facility: HOSPITAL | Age: 55
DRG: 066 | End: 2025-06-18
Payer: COMMERCIAL

## 2025-06-18 LAB
BH CV XLRA MEAS LEFT CAROTID BULB EDV: 18.2 CM/SEC
BH CV XLRA MEAS LEFT CAROTID BULB PSV: 48 CM/SEC
BH CV XLRA MEAS LEFT DIST CCA EDV: 25.3 CM/SEC
BH CV XLRA MEAS LEFT DIST CCA PSV: 73.4 CM/SEC
BH CV XLRA MEAS LEFT DIST ICA EDV: 32.5 CM/SEC
BH CV XLRA MEAS LEFT DIST ICA PSV: 66.2 CM/SEC
BH CV XLRA MEAS LEFT ICA/CCA RATIO: 1.1
BH CV XLRA MEAS LEFT MID ICA EDV: 37 CM/SEC
BH CV XLRA MEAS LEFT MID ICA PSV: 81.8 CM/SEC
BH CV XLRA MEAS LEFT PROX CCA EDV: 26.6 CM/SEC
BH CV XLRA MEAS LEFT PROX CCA PSV: 108.4 CM/SEC
BH CV XLRA MEAS LEFT PROX ECA EDV: 22.7 CM/SEC
BH CV XLRA MEAS LEFT PROX ECA PSV: 77.9 CM/SEC
BH CV XLRA MEAS LEFT PROX ICA EDV: 38.3 CM/SEC
BH CV XLRA MEAS LEFT PROX ICA PSV: 82.4 CM/SEC
BH CV XLRA MEAS LEFT VERTEBRAL A EDV: 16.9 CM/SEC
BH CV XLRA MEAS LEFT VERTEBRAL A PSV: 43.5 CM/SEC
BH CV XLRA MEAS RIGHT CAROTID BULB EDV: 17.3 CM/SEC
BH CV XLRA MEAS RIGHT CAROTID BULB PSV: 44 CM/SEC
BH CV XLRA MEAS RIGHT DIST CCA EDV: 26.8 CM/SEC
BH CV XLRA MEAS RIGHT DIST CCA PSV: 67.2 CM/SEC
BH CV XLRA MEAS RIGHT DIST ICA EDV: 37.5 CM/SEC
BH CV XLRA MEAS RIGHT DIST ICA PSV: 89.3 CM/SEC
BH CV XLRA MEAS RIGHT ICA/CCA RATIO: 0.8
BH CV XLRA MEAS RIGHT MID ICA EDV: 30.4 CM/SEC
BH CV XLRA MEAS RIGHT MID ICA PSV: 69.6 CM/SEC
BH CV XLRA MEAS RIGHT PROX CCA EDV: 30.2 CM/SEC
BH CV XLRA MEAS RIGHT PROX CCA PSV: 100 CM/SEC
BH CV XLRA MEAS RIGHT PROX ECA EDV: 17.3 CM/SEC
BH CV XLRA MEAS RIGHT PROX ECA PSV: 64.3 CM/SEC
BH CV XLRA MEAS RIGHT PROX ICA EDV: 28 CM/SEC
BH CV XLRA MEAS RIGHT PROX ICA PSV: 55.9 CM/SEC
BH CV XLRA MEAS RIGHT VERTEBRAL A EDV: 19 CM/SEC
BH CV XLRA MEAS RIGHT VERTEBRAL A PSV: 60.7 CM/SEC
CHOLEST SERPL-MCNC: 175 MG/DL (ref 0–200)
GLUCOSE BLDC GLUCOMTR-MCNC: 112 MG/DL (ref 70–99)
HDLC SERPL-MCNC: 29 MG/DL (ref 40–60)
LDLC SERPL CALC-MCNC: 122 MG/DL (ref 0–100)
LDLC/HDLC SERPL: 4.14 {RATIO}
LEFT ARM BP: NORMAL MMHG
RIGHT ARM BP: NORMAL MMHG
TRIGL SERPL-MCNC: 129 MG/DL (ref 0–150)
VLDLC SERPL-MCNC: 24 MG/DL (ref 5–40)
WHOLE BLOOD HOLD SPECIMEN: NORMAL

## 2025-06-18 PROCEDURE — 93306 TTE W/DOPPLER COMPLETE: CPT

## 2025-06-18 PROCEDURE — 97161 PT EVAL LOW COMPLEX 20 MIN: CPT

## 2025-06-18 PROCEDURE — 70496 CT ANGIOGRAPHY HEAD: CPT

## 2025-06-18 PROCEDURE — 70498 CT ANGIOGRAPHY NECK: CPT

## 2025-06-18 PROCEDURE — 82948 REAGENT STRIP/BLOOD GLUCOSE: CPT

## 2025-06-18 PROCEDURE — 80053 COMPREHEN METABOLIC PANEL: CPT | Performed by: FAMILY MEDICINE

## 2025-06-18 PROCEDURE — 97165 OT EVAL LOW COMPLEX 30 MIN: CPT

## 2025-06-18 PROCEDURE — 25510000001 IOPAMIDOL PER 1 ML: Performed by: STUDENT IN AN ORGANIZED HEALTH CARE EDUCATION/TRAINING PROGRAM

## 2025-06-18 PROCEDURE — 80061 LIPID PANEL: CPT | Performed by: STUDENT IN AN ORGANIZED HEALTH CARE EDUCATION/TRAINING PROGRAM

## 2025-06-18 PROCEDURE — 99232 SBSQ HOSP IP/OBS MODERATE 35: CPT | Performed by: PSYCHIATRY & NEUROLOGY

## 2025-06-18 PROCEDURE — 92610 EVALUATE SWALLOWING FUNCTION: CPT

## 2025-06-18 PROCEDURE — 25010000002 ENOXAPARIN PER 10 MG: Performed by: STUDENT IN AN ORGANIZED HEALTH CARE EDUCATION/TRAINING PROGRAM

## 2025-06-18 PROCEDURE — 99232 SBSQ HOSP IP/OBS MODERATE 35: CPT | Performed by: STUDENT IN AN ORGANIZED HEALTH CARE EDUCATION/TRAINING PROGRAM

## 2025-06-18 RX ORDER — ASPIRIN 81 MG/1
81 TABLET, CHEWABLE ORAL DAILY
Qty: 30 TABLET | Refills: 0 | Status: SHIPPED | OUTPATIENT
Start: 2025-06-19 | End: 2025-06-20 | Stop reason: SDUPTHER

## 2025-06-18 RX ORDER — QUETIAPINE FUMARATE 50 MG/1
200 TABLET, EXTENDED RELEASE ORAL NIGHTLY
Status: DISCONTINUED | OUTPATIENT
Start: 2025-06-18 | End: 2025-06-19 | Stop reason: HOSPADM

## 2025-06-18 RX ORDER — ATORVASTATIN CALCIUM 80 MG/1
80 TABLET, FILM COATED ORAL NIGHTLY
Qty: 30 TABLET | Refills: 0 | Status: SHIPPED | OUTPATIENT
Start: 2025-06-18 | End: 2025-06-20 | Stop reason: SDUPTHER

## 2025-06-18 RX ORDER — SERTRALINE HYDROCHLORIDE 100 MG/1
100 TABLET, FILM COATED ORAL DAILY
Status: DISCONTINUED | OUTPATIENT
Start: 2025-06-18 | End: 2025-06-19 | Stop reason: HOSPADM

## 2025-06-18 RX ORDER — ACETAMINOPHEN 325 MG/1
650 TABLET ORAL EVERY 6 HOURS PRN
Status: DISCONTINUED | OUTPATIENT
Start: 2025-06-18 | End: 2025-06-19 | Stop reason: HOSPADM

## 2025-06-18 RX ORDER — IOPAMIDOL 755 MG/ML
100 INJECTION, SOLUTION INTRAVASCULAR
Status: COMPLETED | OUTPATIENT
Start: 2025-06-18 | End: 2025-06-18

## 2025-06-18 RX ADMIN — QUETIAPINE FUMARATE 200 MG: 50 TABLET, EXTENDED RELEASE ORAL at 20:50

## 2025-06-18 RX ADMIN — SERTRALINE HYDROCHLORIDE 100 MG: 100 TABLET ORAL at 09:06

## 2025-06-18 RX ADMIN — ASPIRIN 81 MG: 81 TABLET, CHEWABLE ORAL at 09:06

## 2025-06-18 RX ADMIN — Medication 10 ML: at 09:07

## 2025-06-18 RX ADMIN — ATORVASTATIN CALCIUM 80 MG: 40 TABLET, FILM COATED ORAL at 20:50

## 2025-06-18 RX ADMIN — ENOXAPARIN SODIUM 40 MG: 100 INJECTION SUBCUTANEOUS at 09:06

## 2025-06-18 RX ADMIN — ACETAMINOPHEN 650 MG: 325 TABLET ORAL at 15:16

## 2025-06-18 RX ADMIN — IOPAMIDOL 90 ML: 755 INJECTION, SOLUTION INTRAVENOUS at 17:39

## 2025-06-18 RX ADMIN — Medication 10 ML: at 20:50

## 2025-06-18 NOTE — THERAPY EVALUATION
Patient Name: Rosie Medrano  : 1970    MRN: [id number]                              Today's Date: 2025       Admit Date: 2025    Visit Dx:     ICD-10-CM ICD-9-CM   1. Cerebellar stroke, acute  I63.9 434.91   2. Dizziness  R42 780.4   3. Dysphagia, oropharyngeal  R13.12 787.22   4. Decreased activities of daily living (ADL)  Z78.9 V49.89   5. Difficulty in walking  R26.2 719.7     Patient Active Problem List   Diagnosis    Depression    Major depressive disorder, single episode, unspecified    Arthritis    Primary hypertension    Migraine without status migrainosus, not intractable    Right ureteral stone    Gross hematuria    Mixed hyperlipidemia    CVA (cerebral vascular accident)     Past Medical History:   Diagnosis Date    Anxiety     Clotting disorder 2023    Depression     GERD (gastroesophageal reflux disease)     Gross hematuria     Kidney stone     Muscle spasm     Ureteral stone     Urinary tract infection 2023    Blood in urine     Past Surgical History:   Procedure Laterality Date     SECTION      ENDOMETRIAL ABLATION      INGUINAL HERNIA REPAIR      LAPAROSCOPIC TUBAL LIGATION      TUBAL ABDOMINAL LIGATION  05    URETEROSCOPY LASER LITHOTRIPSY WITH STENT INSERTION Right 2023    Procedure: CYSTOSCOPY right URETEROSCOPY  HOLMIUM LASER STENT INSERTION, BILATERAL RETROGRADE;  Surgeon: Samra Mac MD;  Location: [facility] MAIN OR;  Service: Urology;  Laterality: Right;      General Information       Row Name 25 1003          OT Time and Intention    Subjective Information dizziness;complains of  -EG     Document Type evaluation  -EG     Mode of Treatment individual therapy;occupational therapy  -EG     Patient Effort excellent  -EG       Row Name 25 1003          General Information    Patient Profile Reviewed yes  Ind. at home with ADL's and IADL's; no AD used or owned; shower seat with both tub and walk-in-shower available; stands at sink  to groom  -EG     Prior Level of Function independent:;ADL's;transfer;driving;work  -EG     Existing Precautions/Restrictions no known precautions/restrictions  -EG     Barriers to Rehab none identified  -EG       Row Name 06/18/25 1003          Occupational Profile    Reason for Services/Referral (Occupational Profile) Patient is a 54-year-old female admitted to [facility] on 6/17/2025.  OT was consulted due to acute CVA.  OT to assess for new onset of deficits and limitations in ADL/transfer performance.  No previous OT services for current condition.  -EG       Row Name 06/18/25 1003          Living Environment    Current Living Arrangements home  -EG     People in Home spouse  -EG       Row Name 06/18/25 1003          Home Main Entrance    Number of Stairs, Main Entrance four  -EG     Stair Railings, Main Entrance railings safe and in good condition;railings on both sides of stairs  -EG       Row Name 06/18/25 1003          Stairs Within Home, Primary    Number of Stairs, Within Home, Primary none  -EG       Row Name 06/18/25 1003          Cognition    Orientation Status (Cognition) oriented x 4  -EG       Row Name 06/18/25 1003          Safety Issues/Impairments Affecting Functional Mobility    Impairments Affecting Function (Mobility) --  no new onset of deficits  -EG               User Key  (r) = Recorded By, (t) = Taken By, (c) = Cosigned By      Initials Name Provider Type    EG Jessica Lloyd, OT Occupational Therapist                     Mobility/ADL's       Row Name 06/18/25 1008          Bed Mobility    Bed Mobility bed mobility (all) activities  -EG     All Activities, [place] (Bed Mobility) independent  -EG       Row Name 06/18/25 1008          Transfers    Comment, (Transfers) Ind. for all transfers without the use of an AD  -EG       Row Name 06/18/25 1008          Functional Mobility    Functional Mobility- Ind. Level independent  -EG     Functional Mobility- Comment no AD  required for mobility around unit; able to perform retrograde gait pattern upon assessment no LOB  -EG       [facility] Name 06/18/25 1008          Activities of Daily Living    BADL Assessment/Intervention bathing;upper body dressing;lower body dressing;grooming;feeding;toileting  -EG       Row Name 06/18/25 1008          Bathing Assessment/Intervention    [place] Level (Bathing) bathing skills;upper body;lower body;supervision  -EG       Row Name 06/18/25 1008          Upper Body Dressing Assessment/Training    [place] Level (Upper Body Dressing) upper body dressing skills;independent  -EG       Row Name 06/18/25 1008          Lower Body Dressing Assessment/Training    [place] Level (Lower Body Dressing) lower body dressing skills;doff;don;socks;independent  -EG     Comment, (Lower Body Dressing) sitting EOB  -EG       Row Name 06/18/25 1008          Grooming Assessment/Training    [place] Level (Grooming) grooming skills;independent  -EG       Row Name 06/18/25 1008          Self-Feeding Assessment/Training    [place] Level (Feeding) feeding skills;set up  -EG       Row Name 06/18/25 1008          Toileting Assessment/Training    [place] Level (Toileting) toileting skills;perform perineal hygiene;adjust/manage clothing;independent  -EG               User Key  (r) = Recorded By, (t) = Taken By, (c) = Cosigned By      Initials Name Provider Type    EG Jessica Lloyd, OT Occupational Therapist                   Obj/Interventions       [facility] Name 06/18/25 1012          Sensory Assessment (Somatosensory)    Sensory Assessment (Somatosensory) sensation intact  -EG       Row Name 06/18/25 1012          Vision Assessment/Intervention    Visual Impairment/Limitations WFL;corrective lenses full-time  reports of blurred vision with dizziness in EMR, patient reports that has subsided on evaluation  -EG       Row Name 06/18/25 1012          Range of Motion Comprehensive    General Range of Motion  bilateral upper extremity ROM WNL  -EG       Row Name 06/18/25 1012          Strength Comprehensive (MMT)    Comment, General Manual Muscle Testing (MMT) Assessment 4/5 BUE's grossly all major joints  -EG       Row Name 06/18/25 1012          Motor Skills    Motor Skills coordination;functional endurance  -EG     Coordination WFL  -EG     Functional Endurance fair+ on room air during ADL's  -EG       [facility] Name 06/18/25 1012          Balance    Balance Assessment sit to stand dynamic balance;standing dynamic balance  -EG     Sit to Stand Dynamic Balance independent  -EG     Dynamic Standing Balance independent  -EG               User Key  (r) = Recorded By, (t) = Taken By, (c) = Cosigned By      Initials Name Provider Type    EG Jessica Lloyd, ROSSY Occupational Therapist                   Goals/Plan    No documentation.                  Clinical Impression       Row Name 06/18/25 1014          Pain Assessment    Pretreatment Pain Rating 0/10 - no pain  -EG     Posttreatment Pain Rating 0/10 - no pain  -EG       Row Name 06/18/25 1014          Plan of Care Review    Plan of Care Reviewed With patient  -EG     Progress no change  -EG     Outcome Evaluation Patient is pleasant and cooperative; A&Ox4 with only complaints of inconsistent and occ. dizziness per her reports; ADL/Mobility/Transfer performance all display no new onset of deficits, ind. without the use of an AD in room for all; OT eval only; Safe to discharge home upon medical approval per MD for ADL performance.  -EG       Row Name 06/18/25 1014          Therapy Assessment/Plan (OT)    Criteria for Skilled Therapeutic Interventions Met (OT) does not meet criteria for skilled intervention  -EG     Therapy Frequency (OT) evaluation only  -EG       Row Name 06/18/25 1014          Therapy Plan Review/Discharge Plan (OT)    Anticipated Discharge Disposition (OT) home  -EG       Row Name 06/18/25 1014          Positioning and Restraints    Pre-Treatment Position in  bed  -EG     Post Treatment Position bed  -EG     In Bed sitting EOB;encouraged to call for assist;call light within reach  -EG               User Key  (r) = Recorded By, (t) = Taken By, (c) = Cosigned By      Initials Name Provider Type    Jessica Ram OT Occupational Therapist                   Outcome Measures       Row Name 06/18/25 1016          How much help from another is currently needed...    Putting on and taking off regular lower body clothing? 4  -EG     Bathing (including washing, rinsing, and drying) 4  -EG     Toileting (which includes using toilet bed pan or urinal) 4  -EG     Putting on and taking off regular upper body clothing 4  -EG     Taking care of personal grooming (such as brushing teeth) 4  -EG     Eating meals 4  -EG     AM-PAC 6 Clicks Score (OT) 24  -EG       Row Name 06/18/25 0720          How much help from another person do you currently need...    Turning from your back to your side while in flat bed without using bedrails? 4  -GB     Moving from lying on back to sitting on the side of a flat bed without bedrails? 4  -GB     Moving to and from a bed to a chair (including a wheelchair)? 4  -GB     Standing up from a chair using your arms (e.g., wheelchair, bedside chair)? 4  -GB     Climbing 3-5 steps with a railing? 4  -GB     To walk in hospital room? 4  -GB     AM-PAC 6 Clicks Score (PT) 24  -GB       Row Name 06/18/25 1016          Functional Assessment    Outcome Measure Options AM-PAC 6 Clicks Daily Activity (OT);Optimal Instrument  -EG       Row Name 06/18/25 1016          Optimal Instrument    Optimal Instrument Optimal - 3  -EG     Standing 1  -EG     Walking - short distance 1  -EG     Walking - long distance 1  -EG               User Key  (r) = Recorded By, (t) = Taken By, (c) = Cosigned By      Initials Name Provider Type    Magalys Linder, RN Registered Nurse    Jessica Ram OT Occupational Therapist                    Occupational Therapy Education        Title: PT OT SLP Therapies (Done)       Topic: Occupational Therapy (Done)       Point: ADL training (Done)       Learning Progress Summary            Patient Eager, E, VU by EG at 6/18/2025 1016    Comment: Education on OT services  Education on signs and symptoms of CVA                      Point: Home exercise program (Done)       Learning Progress Summary            Patient Eager, E, VU by EG at 6/18/2025 1016    Comment: Education on OT services  Education on signs and symptoms of CVA                      Point: Precautions (Done)       Learning Progress Summary            Patient Eager, E, VU by EG at 6/18/2025 1016    Comment: Education on OT services  Education on signs and symptoms of CVA                      Point: Body mechanics (Done)       Learning Progress Summary            Patient Eager, E, VU by EG at 6/18/2025 1016    Comment: Education on OT services  Education on signs and symptoms of CVA                                      User Key       Initials Effective Dates Name Provider Type Discipline    EG 09/14/22 -  Jessica Lloyd, OT Occupational Therapist OT                  OT Recommendation and Plan  Therapy Frequency (OT): evaluation only  Plan of Care Review  Plan of Care Reviewed With: patient  Progress: no change  Outcome Evaluation: Patient is pleasant and cooperative; A&Ox4 with only complaints of inconsistent and occ. dizziness per her reports; ADL/Mobility/Transfer performance all display no new onset of deficits, ind. without the use of an AD in room for all; OT eval only; Safe to discharge home upon medical approval per MD for ADL performance.     Time Calculation:   Evaluation Complexity (OT)  Review Occupational Profile/Medical/Therapy History Complexity: brief/low complexity  Assessment, Occupational Performance/Identification of Deficit Complexity: 1-3 performance deficits  Clinical Decision Making Complexity (OT): problem focused assessment/low complexity  Overall Complexity  of Evaluation (OT): low complexity     Time Calculation- OT       Row Name 06/18/25 1017             Time Calculation- OT    OT Received On 06/18/25  -EG      OT Goal Re-Cert Due Date 06/18/25  -EG         Untimed Charges    OT Eval/Re-eval Minutes 33  -EG         Total Minutes    Untimed Charges Total Minutes 33  -EG       Total Minutes 33  -EG                User Key  (r) = Recorded By, (t) = Taken By, (c) = Cosigned By      Initials Name Provider Type    EG Jessica Lloyd OT Occupational Therapist                  Therapy Charges for Today       Code Description Service Date Service Provider Modifiers Qty    50932090174 HC OT EVAL LOW COMPLEXITY 3 6/18/2025 Jessica Lloyd OT GO 1                 Jessica Lloyd OT  6/18/2025

## 2025-06-18 NOTE — PLAN OF CARE
Goal Outcome Evaluation:  Plan of Care Reviewed With: patient        Progress: no change  Outcome Evaluation: Patient stable this shift without signifiacnt changes.

## 2025-06-18 NOTE — PROGRESS NOTES
[facility]   Hospitalist Progress Note  Date: 2025  Patient Name: Rosie Medrano  : 1970  MRN: [id number]  Date of admission: 2025  Room/Bed: 215/2      Subjective   Subjective     Chief Complaint: dizziness    Summary:    Rosie Medrano is a 54 y.o. female significant past medical history anxiety, depression, GERD presented to the ED complaining of dizziness for a week.  Patient initially went to her PCP due to dizziness about a week ago when she was diagnosed with ear infection and was started on Augmentin, symptoms did not improve so presented to the ER. MRI of the brain was obtained revealing acute infarcts in the cerebellum. Teleneurology was consulted, recommending admission for stroke workup. Started on aspirin and statin. High LDL. Worked with PT. CTA pending    Interval Followup:     Discussed with patient that she had a stroke and that she is pending neurology clearance.     All systems reviewed and negative except for what is outlined above.      Objective   Objective     Vitals:   Temp:  [97.7 °F (36.5 °C)-98.2 °F (36.8 °C)] 98.2 °F (36.8 °C)  Heart Rate:  [59-83] 82  Resp:  [18] 18  BP: (121-146)/(58-92) 136/84    Physical Exam   General: NAD  Cardiovascular: RRR  Pulmonary: no conversational dyspnea  Neuro: moving spontaneously   Psych: Mood and affect appropriate      Result Review    Result Review:  I have personally reviewed these results:  [x]  Laboratory      Lab 25  1105   WBC 8.73   HEMOGLOBIN 17.1*   HEMATOCRIT 50.8*   PLATELETS 304   NEUTROS ABS 5.15   IMMATURE GRANS (ABS) 0.02   LYMPHS ABS 2.80   MONOS ABS 0.48   EOS ABS 0.18   MCV 95.5   PROTIME 13.7         Lab 25  1105   SODIUM 133*   POTASSIUM 4.9   CHLORIDE 97*   CO2 26.4   ANION GAP 9.6   BUN 17.7   CREATININE 1.14*   EGFR 57.7*   GLUCOSE 142*   CALCIUM 9.6   MAGNESIUM 2.5   HEMOGLOBIN A1C 5.10   TSH 0.913         Lab 25  1105   TOTAL PROTEIN 7.4   ALBUMIN 4.7   GLOBULIN 2.7   ALT (SGPT) 9    AST (SGOT) 24   BILIRUBIN 0.4   ALK PHOS 99         Lab 06/17/25  1248 06/17/25  1105   HSTROP T <6 <6   PROTIME  --  13.7   INR  --  1.01         Lab 06/18/25  0641   CHOLESTEROL 175   LDL CHOL 122*   HDL CHOL 29*   TRIGLYCERIDES 129             Brief Urine Lab Results  (Last result in the past 365 days)        Color   Clarity   Blood   Leuk Est   Nitrite   Protein   CREAT   Urine HCG        06/17/25 1108 Dark Yellow   Turbid   Negative   Small (1+)   Negative   Negative                 [x]  Microbiology   Microbiology Results (last 10 days)       ** No results found for the last 240 hours. **          [x]  Radiology  MRI Brain Without Contrast  Result Date: 6/17/2025  Impression: 1.Acute infarctions involving right cerebellum. No hemorrhagic transformation or significant mass effect. 2.Findings suggestive of mild chronic small vessel ischemic disease. 3.Partial right mastoid effusion. Electronically Signed: Enrrique Blanchard MD  6/17/2025 4:13 PM EDT  Workstation ID: [id number]    XR Chest 1 View  Result Date: 6/17/2025  Impression: No acute cardiopulmonary findings. Electronically Signed: Man Suarez MD  6/17/2025 12:50 PM EDT  Workstation ID: [id number]    CT Head Without Contrast  Result Date: 6/17/2025  Impression: Small linear area of hypoattenuation within the right cerebellum, not present on prior brain MRI but without acute features by CT and may represent sequela of prior insult. No acute intracranial findings otherwise. Brain MRI could be considered for further evaluation if felt to be clinically indicated. Electronically Signed: Man Suarez MD  6/17/2025 12:49 PM EDT  Workstation ID: [id number]    []  EKG/Telemetry   []  Cardiology/Vascular   []  Pathology  []  Old records  []  Other:    Assessment & Plan        Assessment and Plan:    #Acute cerebellar stroke  -tele neurology   -asa and statin  -follow up CTA  -PT OT    #Anxiety  -seroquel  -zoloft       Discussed with RN.    VTE  Prophylaxis:  Pharmacologic VTE prophylaxis orders are present.        CODE STATUS:   Code Status (Patient has no pulse and is not breathing): CPR (Attempt to Resuscitate)  Medical Interventions (Patient has pulse or is breathing): Full Support  Level Of Support Discussed With: Patient      Electronically signed by Jeanmarie Currie MD, 6/18/2025, 18:46 EDT.

## 2025-06-18 NOTE — PROGRESS NOTES
[facility]  [facility] Tele[facility]urology Consult Services    Routine Consult Follow-Up    Patient Name:   Rosie Medrano  YOB: 1970  Identification Number:   MRN - [id number]  Date of Service:   06/18/2025 16:55:05    Diagnosis        I63.89 - Cerebrovascular accident (CVA) due to other mechanism ([facility])    Impression  Patient is a 54-year-old female with no significant past medical history found to have right cerebellar infarct. Patient is pending echocardiogram results. If evidence of PFO recommend lower extremity venous Dopplers and cardiology consult. Patient will need further cardiac workup, possible MARTITA. Long-term will need loop recorder/Holter monitor. Patient to continue on aspirin and statin. Recommend CTA head and neck to further evaluate posterior circulation.     Our recommendations are outlined below    Antithrombotic Medication :  Aspirin 81 mg PO daily    Nursing Recommendations :  Continue with Telemetry    Consultations :  Physical therapy/Occupational therapy    DVT Prophylaxis :  Choice of Primary Team    Disposition :  No further recommendationsOutpatient Neurology follow up in 1-3 weeks    Subjective  Patient states last week she felt dizzy with ambulation. She was given antibiotics for infection with no plan. Denies any prior history of prior stroke.    Hospital Course  Patient is a 53-year-old female with no significant past medical history presenting for slurred speech and dizziness.    Imaging  Carotid Doppler:  Interpretation Summary       Right internal carotid artery demonstrates normal flow without evidence of hemodynamically significant stenosis.   Left internal carotid artery demonstrates normal flow without evidence of hemodynamically significant stenosis.   There is minimal to mild nonstenotic carotid bulb plaque bilaterally.   Antegrade right vertebral flow.   Antegrade left vertebral flow.    MRI brain: Impression:  1.Acute infarctions involving right  cerebellum. No hemorrhagic transformation or significant mass effect.  2.Findings suggestive of mild chronic small vessel ischemic disease.  3.Partial right mastoid effusion.    Labs  Sodium 133, potassium 4 4, GFR 57.7, glucose, hemoglobin A1c 5.10, , WBC 8.73, platelets 304      Examination  BP(135/92), Pulse(71), Temp(98.2), Resp(18),  1A: Level of Consciousness - Alert; keenly responsive + 0  1B: Ask Month and Age - Both Questions Right + 0  1C: Blink Eyes & Squeeze Hands - Performs Both Tasks + 0  2: Test Horizontal Extraocular Movements - Normal + 0  3: Test Visual Fields - No Visual Loss + 0  4: Test Facial Palsy (Use Grimace if Obtunded) - Normal symmetry + 0  5A: Test Left Arm Motor Drift - No Drift for 10 Seconds + 0  5B: Test Right Arm Motor Drift - No Drift for 10 Seconds + 0  6A: Test Left Leg Motor Drift - No Drift for 5 Seconds + 0  6B: Test Right Leg Motor Drift - No Drift for 5 Seconds + 0  7: Test Limb Ataxia (FNF/Heel-Shin) - No Ataxia + 0  8: Test Sensation - Normal; No sensory loss + 0  9: Test Language/Aphasia - Normal; No aphasia + 0  10: Test Dysarthria - Normal + 0  11: Test Extinction/Inattention - No abnormality + 0    NIHSS Score: 0          This consult was conducted in real time using interactive audio and video technology. Patient was informed of the technology being used for this visit and agreed to proceed. Patient located in hospital and provider located at home/office setting.    Telehealth Neurology consultation was provided. I spent minutes providing telehealth care. This includes time spent for face to face visit via telemedicine, review of medical records, imaging studies and discussion of findings with providers, the patient and/or family.      Dr Iraida Valencia      [facility]  For Inpatient follow-up with [facility] physician please call [facility] at [phone]. As we are not an outpatient service for any post hospital discharge needs please contact the hospital  for assistance.  If you have any questions for the [facility] physicians or need to reconsult for clinical or diagnostic changes please contact us via [facility] at [phone]

## 2025-06-18 NOTE — PLAN OF CARE
Goal Outcome Evaluation:                      FUNCTIONAL ASSESSMENT INSTRUMENT: Patient currently scored a level 7 of 7 on Functional Communication Measures for swallowing indicating a 0% limitation in function.    ASSESSMENT/ PLAN OF CARE:  Pt presents with functional oropharyngeal swallow.  No clinical signs or symptoms of aspiration noted    REHAB POTENTIAL:  Pt has good rehab potential.  The following limitations may influence improvement/ length of tx medical status.    RECOMMENDATIONS:   1.   DIET: Regular diet - thin liquids.     2.  POSITION: 90 degrees upright    3.  COMPENSATORY STRATEGIES: General Swallow precautions    No further dysphagia therapy is indicated at this time    Pt/responsible party agrees with plan of care and has been informed of all alternatives, risks and benefits.    EDUCATION  The patient has been educated in the following areas:   Dysphagia (Swallowing Impairment).

## 2025-06-18 NOTE — CONSULTS
Consult received per Stroke Protocol. Patient without a noted DM diagnosis, with a current HbA1c of 5.1%, and an estimated average glucose of 100 mg/dl. Blood glucose values have remained WDL, with a low of 112 mg/dl, and a high of 142 mg/dl.

## 2025-06-18 NOTE — THERAPY EVALUATION
Acute Care - Physical Therapy Initial Evaluation   [facility]     Patient Name: Rosie Medrano  : 1970  MRN: [id number]  Today's Date: 2025      Visit Dx:     ICD-10-CM ICD-9-CM   1. Cerebellar stroke, acute  I63.9 434.91   2. Dizziness  R42 780.4   3. Dysphagia, oropharyngeal  R13.12 787.22   4. Decreased activities of daily living (ADL)  Z78.9 V49.89   5. Difficulty in walking  R26.2 719.7     Patient Active Problem List   Diagnosis    Depression    Major depressive disorder, single episode, unspecified    Arthritis    Primary hypertension    Migraine without status migrainosus, not intractable    Right ureteral stone    Gross hematuria    Mixed hyperlipidemia    CVA (cerebral vascular accident)     Past Medical History:   Diagnosis Date    Anxiety     Clotting disorder 2023    Depression     GERD (gastroesophageal reflux disease)     Gross hematuria     Kidney stone     Muscle spasm     Ureteral stone     Urinary tract infection 2023    Blood in urine     Past Surgical History:   Procedure Laterality Date     SECTION      ENDOMETRIAL ABLATION      INGUINAL HERNIA REPAIR      LAPAROSCOPIC TUBAL LIGATION      TUBAL ABDOMINAL LIGATION  05    URETEROSCOPY LASER LITHOTRIPSY WITH STENT INSERTION Right 2023    Procedure: CYSTOSCOPY right URETEROSCOPY  HOLMIUM LASER STENT INSERTION, BILATERAL RETROGRADE;  Surgeon: Samra Mac MD;  Location: [facility] MAIN OR;  Service: Urology;  Laterality: Right;     PT Assessment (Last 12 Hours)       PT Evaluation and Treatment       Row Name 25 1012          Physical Therapy Time and Intention    Subjective Information no complaints (P)   -EB     Document Type evaluation (P)   -EB     Mode of Treatment individual therapy;physical therapy (P)   -EB     Patient Effort good (P)   -EB       Row Name 25 1012          General Information    Patient Profile Reviewed yes (P)   -EB     Patient Observations alert;agree to  therapy;cooperative (P)   -EB     Prior Level of Function independent:;all household mobility;community mobility (P)   -EB     Equipment Currently Used at Home none (P)   -EB     Existing Precautions/Restrictions no known precautions/restrictions (P)   -[facility] Name 06/18/25 1012          Living Environment    Current Living Arrangements home (P)   -EB     Home Accessibility stairs to enter home (P)   -EB     People in Home spouse (P)   -[facility] Name 06/18/25 1012          Home Main Entrance    Number of Stairs, Main Entrance four (P)   -[facility] Name 06/18/25 1012          Home Use of Assistive/Adaptive Equipment    Equipment Currently Used at Home none (P)   -EB       [facility] Name 06/18/25 1012          Range of Motion (ROM)    Range of Motion ROM is WFL (P)   -[facility] Name 06/18/25 1012          Strength (Manual Muscle Testing)    Strength (Manual Muscle Testing) strength is WFL (P)   except BLE: 4+/5 knee extension  -[facility] Name 06/18/25 1012          Bed Mobility    Bed Mobility supine-sit;sit-supine (P)   -EB     All Activities, Rogers (Bed Mobility) independent (P)   -EB     Supine-Sit Rogers (Bed Mobility) standby assist (P)   -EB     Sit-Supine Rogers (Bed Mobility) standby assist (P)   -[facility] Name 06/18/25 1012          Transfers    Transfers sit-stand transfer;stand-sit transfer (P)   -EB     Maintains Weight-bearing Status (Transfers) able to maintain (P)   -[facility] Name 06/18/25 1012          Sit-Stand Transfer    Sit-Stand Rogers (Transfers) modified independence (P)   -EB     Assistive Device (Sit-Stand Transfers) walker, front-wheeled (P)   -EB       Row Name 06/18/25 1012          Stand-Sit Transfer    Stand-Sit Rogers (Transfers) modified independence;standby assist (P)   -EB     Assistive Device (Stand-Sit Transfers) walker, front-wheeled (P)   -EB       Row Name 06/18/25 1012          Gait/Stairs (Locomotion)    Gait/Stairs Locomotion  gait/ambulation assistive device (P)   -EB     Sharon Level (Gait) standby assist (P)   -EB     Assistive Device (Gait) walker, front-wheeled (P)   -EB     Patient was able to Ambulate yes (P)   -EB     Distance in Feet (Gait) 200 (P)   -EB     Pattern (Gait) step-through (P)   -EB     Maintains Weight-bearing Status (Gait) able to maintain (P)   -EB       Row Name 06/18/25 1012          Safety Issues/Impairments Affecting Functional Mobility    Safety Issues Affecting Function (Mobility) ability to follow commands (P)   -EB       Row Name 06/18/25 1017          Balance    Balance Assessment standing dynamic balance (P)   -EB     Sit to Stand Dynamic Balance standby assist (P)   -EB     Dynamic Standing Balance standby assist (P)   -EB     Position/Device Used, Standing Balance walker, front-wheeled (P)   -EB       Row Name 06/18/25 1017          Plan of Care Review    Plan of Care Reviewed With patient (P)   -EB     Outcome Evaluation Patient presents with no limitaions impeding safe transfers and ambulation. Skilled rehab services are not required at this time. (P)   -EB       Row Name 06/18/25 1017          Positioning and Restraints    Pre-Treatment Position in bed (P)   -EB     Post Treatment Position bed (P)   -EB       Row Name 06/18/25 1017          Therapy Assessment/Plan (PT)    Therapy Frequency (PT) evaluation only (P)   -EB       Row Name 06/18/25 1017          PT Evaluation Complexity    History, PT Evaluation Complexity no personal factors and/or comorbidities (P)   -EB     Examination of Body Systems (PT Eval Complexity) total of 3 or more elements (P)   -EB     Clinical Presentation (PT Evaluation Complexity) stable (P)   -EB     Clinical Decision Making (PT Evaluation Complexity) low complexity (P)   -EB     Overall Complexity (PT Evaluation Complexity) low complexity (P)   -EB               User Key  (r) = Recorded By, (t) = Taken By, (c) = Cosigned By      Initials Name Provider Type    EB  Jessica Negron, PT Student PT Student                    Physical Therapy Education       Title: PT OT SLP Therapies (Done)       Topic: Physical Therapy (Done)       Point: Mobility training (Done)       Learning Progress Summary            Patient Acceptance, E,TB, VU by  at 6/18/2025 1020                                      User Key       Initials Effective Dates Name Provider Type Discipline     05/27/25 -  Jessica Negron, PT Student PT Student PT                  PT Recommendation and Plan  Anticipated Discharge Disposition (PT): (P) home  Therapy Frequency (PT): (P) evaluation only  Plan of Care Reviewed With: (P) patient  Outcome Evaluation: (P) Patient presents with no limitaions impeding safe transfers and ambulation. Skilled rehab services are not required at this time.   Outcome Measures       Row Name 06/18/25 1020             How much help from another person do you currently need...    Turning from your back to your side while in flat bed without using bedrails? 4 (P)   -EB      Moving from lying on back to sitting on the side of a flat bed without bedrails? 4 (P)   -EB      Moving to and from a bed to a chair (including a wheelchair)? 4 (P)   -EB      Standing up from a chair using your arms (e.g., wheelchair, bedside chair)? 4 (P)   -EB      Climbing 3-5 steps with a railing? 4 (P)   -EB      To walk in hospital room? 4 (P)   -EB      AM-PAC 6 Clicks Score (PT) 24 (P)   -EB         Functional Assessment    Outcome Measure Options AM-PAC 6 Clicks Basic Mobility (PT) (P)   -EB                User Key  (r) = Recorded By, (t) = Taken By, (c) = Cosigned By      Initials Name Provider Type    EB Jessica Negron, PT Student PT Student                     Time Calculation:    PT Charges       Row Name 06/18/25 1011             Time Calculation    PT Received On 06/18/25 (P)   -EB         Untimed Charges    PT Eval/Re-eval Minutes 15 (P)   -EB         Total Minutes    Untimed Charges Total Minutes 15 (P)    -EB       Total Minutes 15 (P)   -EB                User Key  (r) = Recorded By, (t) = Taken By, (c) = Cosigned By      Initials Name Provider Type    EB Jessica Negron, PT Student PT Student                  Therapy Charges for Today       Code Description Service Date Service Provider Modifiers Qty    [id number] HC PT EVAL LOW COMPLEXITY 2 6/18/2025 Jessica Negron, PT Student GP 1            PT G-Codes  Outcome Measure Options: (P) AM-PAC 6 Clicks Basic Mobility (PT)  AM-PAC 6 Clicks Score (PT): (P) 24  AM-PAC 6 Clicks Score (OT): 24    Jessica Negron PT Student  6/18/2025     yes

## 2025-06-18 NOTE — PAYOR COMM NOTE
"UR DEPARTMENT    Aysha Triplett RN  Phone [phone]  Fax [phone]    [address] [place] [address]    NPI [id number]  TAX ID [id number]    PHYSICIAN NAME AND NPI  ALEX HARE  [id number]    BED TYPE  INPATIENT TELEMETRY    TYPE OF ADMISSION: ED ADMISSION MEDICAL    ICD 10 CODE I63.9    DATE OF ADMISSION 06/17/2025      Rosie Medrano (54 y.o. Female)       Date of Birth   1970    Social Security Number       [place]   [address] [facility] [address]    Home Phone       MRN   [id number]       Shinto   Taoism    Marital Status                               Admission Date   6/17/2025    Admission Type   Emergency    Admitting Provider   Alex Hare MD    Attending Provider   Jeanmarie Currie MD    Department, Room/Bed   [facility], 215/2       Discharge Date       Discharge Disposition       Discharge Destination                                 Attending Provider: Jeanmarie Currie MD    Allergies: No Known Allergies    Isolation: None   Infection: None   Code Status: CPR    Ht: 167.6 cm (66\")   Wt: 68.5 kg (151 lb 0.2 oz)    Admission Cmt: None   Principal Problem: CVA (cerebral vascular accident) [I63.9]                   Active Insurance as of 6/17/2025       Primary Coverage       Payor Plan Insurance Group Employer/Plan Group    [facility] [employer] [facility] [employer] PPO [id number]       Payor Plan Address Payor Plan Phone Number Payor Plan Fax Number Effective Dates    [address] [phone]  1/11/2025 - None Entered    [place] [address]         Subscriber Name Subscriber Birth Date Member ID       ROSIE MEDRANO 1970 [id number]                     Emergency Contacts        (Rel.) Home Phone Work Phone Mobile Phone    BROOKJHONY (Spouse) -- -- [phone]    KAIDENSHASHIANTON (Mother) -- -- [phone]             Stroke: Ischemic [facility] Inpatient Care       Indications " Met   Last updated by Jaki Rick RN on 6/17/2025 1827     Review Status Created By   Primary Completed Jaki Rick RN      Criteria Review   Stroke: Ischemic [facility] Inpatient Care     Overall Determination: Indications Met     Criteria:  [×] Admission is indicated for  1 or more  of the following  [A]:      [×] Acute ischemic stroke [A] with neurologic findings that warrant inpatient care, as indicated by  1 or more  of the following :          [×] Gait impairment              6/17/2025  6:27 PM                  -- 6/17/2025  6:27 PM by Jaki Rick RN --                      Unsteady gait. + dizziness     Notes:  -- 6/17/2025  6:27 PM by Jaki Rick RN --      Subject: Admission      To ED c/o dizziness & slurred speech x 1 week. Seen by PCP last week & on Augmentin for ear infection. Also on Meclizine.  Has never had ear pain- just dizziness. Today, developed unsteady gait 2/2 dizziness. No CP. No SOA.       BLE cramping.       GCS 15. Passed dysphagia screen.       Per clinic note: Seen in clinic today for dizziness, completed antibiotics, reported slurred speech and illegible handwriting.             PMHx: Anxiety, Depression, GERD, kidney stones                  ED results;       Patient was assessed by physical therapist for evaluation of BPPV. This testing was negative. She also tested patient for vestibular neuritis which was negativ                  CT head: Small linear area of hypoattenuation within the right cerebellum, not present on prior brain MRI but without acute features by CT and may represent sequela of prior insult. No acute intracranial findings otherwise.      MRI brain: 1.Acute infarctions involving right cerebellum. No hemorrhagic transformation or significant mass effect. 2.Findings suggestive of mild chronic small vessel ischemic disease. 3.Partial right mastoid effusion.      CXR: NAD      MRI brain: Acute infarctions of cerebellum.                  Cr 1.14, Na+ 133, Trops neg       H/H       UA: Trace ketones, 1+ leuko esterase, Trace bacteria.                   IN ED:       Neuro consult      IV NS 1000ml bolus      Passed dysphagia screen.                   Admit:       Telemetry      Neuro consult      O2 prn      ECHO      PT/OT/SLP consult      Lovenox SQ      ASA qd      Lipitor PO qd             [place]: NPI [id number] Tax ID [id number]     History & Physical        Alex Hare MD at 25 1756           [facility] HISTORY AND PHYSICAL  Date: 2025   Patient Name: Rosie Medrano  : 1970  MRN: [id number]  Primary Care Physician:  Andrés Marcial MD  Date of admission: 2025    Subjective  Subjective     Chief Complaint: Dizziness    HPI:    Rosie Medrano is a 54 y.o. female significant past medical history anxiety, depression, GERD presented to the ED complaining of dizziness for a week.  Patient initially went to her PCP due to dizziness about a week ago when she was diagnosed with ear infection and was started on Augmentin, symptoms did not improve and she went back to her PCP who was planning on ordering a CT scan of the brain, however patient decided come to the ER instead,.  The ER MRI of the brain was obtained revealing acute infarcts in the cerebellum.  Teleneurology was consulted, recommending admission for stroke workup.      Personal History     Past Medical History:  Past Medical History:   Diagnosis Date   • Anxiety    • Clotting disorder 2023   • Depression    • GERD (gastroesophageal reflux disease)    • Gross hematuria    • Kidney stone    • Muscle spasm    • Ureteral stone    • Urinary tract infection 2023    Blood in urine       Past Surgical History:  Past Surgical History:   Procedure Laterality Date   •  SECTION     • ENDOMETRIAL ABLATION     • INGUINAL HERNIA REPAIR     • LAPAROSCOPIC TUBAL LIGATION     • TUBAL ABDOMINAL LIGATION  05   • URETEROSCOPY LASER LITHOTRIPSY WITH STENT  INSERTION Right 08/18/2023    Procedure: CYSTOSCOPY right URETEROSCOPY  HOLMIUM LASER STENT INSERTION, BILATERAL RETROGRADE;  Surgeon: Samra Mac MD;  Location: [facility];  Service: Urology;  Laterality: Right;       Family History:   Family History   Problem Relation Age of Onset   • Hypertension Mother    • Depression Mother    • Diabetes Mother    • Anxiety disorder Mother    • Kidney disease Mother    • Multiple sclerosis Sister    • No Known Problems Daughter    • No Known Problems Son    • No Known Problems Son    • Arthritis Other    • COPD Other    • Diabetes type II Other    • Malig Hyperthermia Neg Hx        Social History:   Social History     Socioeconomic History   • Marital status:    Tobacco Use   • Smoking status: Every Day     Current packs/day: 1.00     Average packs/day: 1 pack/day for 37.6 years (37.6 ttl pk-yrs)     Types: Cigarettes     Start date: 11/19/1987     Passive exposure: Current   • Smokeless tobacco: Never   Vaping Use   • Vaping status: Never Used   Substance and Sexual Activity   • Alcohol use: Yes     Comment: occasionally   • Drug use: Never   • Sexual activity: Defer       Home Medications:  QUEtiapine XR, cyclobenzaprine, diclofenac, ibuprofen, meclizine, ondansetron, and sertraline    Allergies:  No Known Allergies    Review of Systems   All systems were reviewed and negative except for: As indicated in HPI otherwise negative    Objective  Objective     Vitals:   Temp:  [98 °F (36.7 °C)-98.1 °F (36.7 °C)] 98.1 °F (36.7 °C)  Heart Rate:  [] 70  Resp:  [18] 18  BP: (116-145)/(57-88) 135/81    Physical Exam    Constitutional: Awake, alert, no acute distress   Eyes: Pupils equal, sclerae anicteric, no conjunctival injection   HENT: NCAT, mucous membranes moist   Neck: Supple, no thyromegaly, no lymphadenopathy, trachea midline   Respiratory: Clear to auscultation bilaterally, nonlabored respirations    Cardiovascular: RRR, no murmurs, rubs, or gallops,  palpable pedal pulses bilaterally   Gastrointestinal: Positive bowel sounds, soft, nontender, nondistended   Musculoskeletal: No bilateral ankle edema, no clubbing or cyanosis to extremities   Psychiatric: Appropriate affect, cooperative   Neurologic: Oriented x 3, strength symmetric in all extremities, Cranial Nerves grossly intact to confrontation, speech clear   Skin: No rashes     Result Review   Result Review:  I have personally reviewed the results from the time of this admission to 6/17/2025 17:57 EDT and agree with these findings:  [x]  Laboratory  [x]  Microbiology  [x]  Radiology  [x]  EKG/Telemetry   [x]  Cardiology/Vascular   []  Pathology  []  Old records  []  Other:      Assessment & Plan  Assessment / Plan     Assessment/Plan:   Acute infarctions involving right cerebellum   - Admit to telemetry  - Obtain echocardiogram  - Teleneurology consulted  - Neurochecks  - Physical therapy  - Occupational Therapy  - Monitor telemetry  - Obtain bilateral carotid ultrasound  - Lipid panel  - Hemoglobin A1c  - Start patient on aspirin 81 mg, awaiting further recommendation from teleneurology for possible DAPT therapy.    Chronic conditions:  Anxiety  Depression  GERD  -Continue home meds for chronic conditions and physical.      VTE Prophylaxis:  Pharmacologic VTE prophylaxis orders are signed & held.          CODE STATUS:    Code Status (Patient has no pulse and is not breathing): CPR (Attempt to Resuscitate)  Medical Interventions (Patient has pulse or is breathing): Full Support  Level Of Support Discussed With: Patient      Admission Status:  I believe this patient meets inpatient status.    Electronically signed by Alex Hare MD, 06/17/25, 5:57 PM EDT.             Electronically signed by Alex Hare MD at 06/18/25 0605          Emergency Department Notes        Micha Negron DO at 06/17/25 1817          SHARED VISIT ATTESTATION:    This visit was performed by myself and an APC.  I personally  "approved the management plan/medical decision making and take responsibility for the patient management.      SHARED VISIT NOTE:    Patient is 54 y.o. year old female that presents to the ED for evaluation of dizziness.     Physical Exam    ED Course:    /72   Pulse 68   Temp 98.1 °F (36.7 °C) (Oral)   Resp 18   Ht 167.6 cm (66\")   Wt 68.5 kg (151 lb 0.2 oz)   SpO2 96%   Breastfeeding No   BMI 24.37 kg/m²       The following orders were placed and all results were independently analyzed by me:  Orders Placed This Encounter   Procedures   • XR Chest 1 View   • CT Head Without Contrast   • MRI Brain Without Contrast   • [place] Draw   • Comprehensive Metabolic Panel   • Urinalysis With Culture If Indicated -   • CBC Auto Differential   • Urinalysis, Microscopic Only - Urine, Clean Catch   • hCG, Quantitative, Pregnancy   • TSH Rfx On Abnormal To Free T4   • Magnesium   • High Sensitivity Troponin T   • High Sensitivity Troponin T 1Hr   • Protime-INR   • NPO Diet NPO Type: Strict NPO   • Undress & Gown   • Continuous Pulse Oximetry   • Vital Signs   • Orthostatic Blood Pressure   • Orthostatic Vitals (Blood Pressure & Heart Rate)   • Code Status and Medical Interventions: CPR (Attempt to Resuscitate); Full Support   • IP General Consult (Use specialty-specific consult if known)   • Inpatient Hospitalist Consult   • PT Consult: Eval & Treat Functional Mobility Below Baseline   • PT Plan of Care Cert / Re-Cert   • Oxygen Therapy- Nasal Cannula; Titrate 1-6 LPM Per SpO2; 90 - 95%   • POC Glucose Once   • ECG 12 Lead ED Triage Standing Order; Weak / Dizzy / AMS   • Insert Peripheral IV   • Inpatient Admission   • Fall Precautions   • CBC & Differential   • Green Top (Gel)   • Lavender Top   • Gold Top - SST   • Light Blue Top       Medications Given in the Emergency Department:  Medications   sodium chloride 0.9 % flush 10 mL (has no administration in time range)   sodium chloride 0.9 % bolus 1,000 mL (0 mL " Intravenous Stopped 6/17/25 1321)        ED Course:    ED Course as of 06/17/25 1817 Tue Jun 17, 2025   0631 Patient was assessed by physical therapist for evaluation of BPPV.  This testing was negative.  She also tested patient for vestibular neuritis which was negative. [AS]   1652 MRI Brain Without Contrast  Acute infarctions of cerebellum. [AS]   1746 Neurologist on-call states patient will need admission for stroke workup. [AS]      ED Course User Index  [AS] Deanna Tomlinson PA-C       Labs:    Lab Results (last 24 hours)       Procedure Component Value Units Date/Time    CBC & Differential [[id number]]  (Abnormal) Collected: 06/17/25 1105    Specimen: Blood from Arm, Right Updated: 06/17/25 1114    Narrative:      The following orders were created for panel order CBC & Differential.  Procedure                               Abnormality         Status                     ---------                               -----------         ------                     CBC Auto Differential[[id number]]        Abnormal            Final result                 Please view results for these tests on the individual orders.    Comprehensive Metabolic Panel [[id number]]  (Abnormal) Collected: 06/17/25 1105    Specimen: Blood from Arm, Right Updated: 06/17/25 1133     Glucose 142 mg/dL      BUN 17.7 mg/dL      Creatinine 1.14 mg/dL      Sodium 133 mmol/L      Potassium 4.9 mmol/L      Chloride 97 mmol/L      CO2 26.4 mmol/L      Calcium 9.6 mg/dL      Total Protein 7.4 g/dL      Albumin 4.7 g/dL      ALT (SGPT) 9 U/L      AST (SGOT) 24 U/L      Alkaline Phosphatase 99 U/L      Total Bilirubin 0.4 mg/dL      Globulin 2.7 gm/dL      A/G Ratio 1.7 g/dL      BUN/Creatinine Ratio 15.5     Anion Gap 9.6 mmol/L      eGFR 57.7 mL/min/1.73     Narrative:      GFR Categories in Chronic Kidney Disease (CKD)              GFR Category          GFR (mL/min/1.73)    Interpretation  G1                    90 or greater        Normal or high  (1)  G2                    60-89                Mild decrease (1)  G3a                   45-59                Mild to moderate decrease  G3b                   30-44                Moderate to severe decrease  G4                    15-29                Severe decrease  G5                    14 or less           Kidney failure    (1)In the absence of evidence of kidney disease, neither GFR category G1 or G2 fulfill the criteria for CKD.    eGFR calculation 2021 CKD-EPI creatinine equation, which does not include race as a factor    CBC Auto Differential [[id number]]  (Abnormal) Collected: 06/17/25 1105    Specimen: Blood from Arm, Right Updated: 06/17/25 1114     WBC 8.73 10*3/mm3      RBC 5.32 10*6/mm3      Hemoglobin 17.1 g/dL      Hematocrit 50.8 %      MCV 95.5 fL      MCH 32.1 pg      MCHC 33.7 g/dL      RDW 13.2 %      RDW-SD 46.7 fl      MPV 9.7 fL      Platelets 304 10*3/mm3      Neutrophil % 59.0 %      Lymphocyte % 32.1 %      Monocyte % 5.5 %      Eosinophil % 2.1 %      Basophil % 1.1 %      Immature Grans % 0.2 %      Neutrophils, Absolute 5.15 10*3/mm3      Lymphocytes, Absolute 2.80 10*3/mm3      Monocytes, Absolute 0.48 10*3/mm3      Eosinophils, Absolute 0.18 10*3/mm3      Basophils, Absolute 0.10 10*3/mm3      Immature Grans, Absolute 0.02 10*3/mm3      nRBC 0.0 /100 WBC     hCG, Quantitative, Pregnancy [[id number]] Collected: 06/17/25 1105    Specimen: Blood from Arm, Right Updated: 06/17/25 1205     HCG Quantitative 3.06 mIU/mL     Narrative:      HCG Ranges by Gestational Age    Females - non-pregnant premenopausal   </= 1mIU/mL HCG  Females - postmenopausal               </= 7mIU/mL HCG    3 Weeks       5.4   -      72 mIU/mL  4 Weeks      10.2   -     708 mIU/mL  5 Weeks       217   -   8,245 mIU/mL  6 Weeks       152   -  32,177 mIU/mL  7 Weeks     4,059   - 153,767 mIU/mL  8 Weeks    31,366   - 149,094 mIU/mL  9 Weeks    59,109   - 135,901 mIU/mL  10 Weeks   44,186   - 170,409 mIU/mL  12 Weeks    27,107   - 201,615 mIU/mL  14 Weeks   24,302   -  93,646 mIU/mL  15 Weeks   12,540   -  69,747 mIU/mL  16 Weeks    8,904   -  55,332 mIU/mL  17 Weeks    8,240   -  51,793 mIU/mL  18 Weeks    9,649   -  55,271 mIU/mL      TSH Rfx On Abnormal To Free T4 [[id number]]  (Normal) Collected: 06/17/25 1105    Specimen: Blood from Arm, Right Updated: 06/17/25 1214     TSH 0.913 uIU/mL     Magnesium [[id number]]  (Normal) Collected: 06/17/25 1105    Specimen: Blood from Arm, Right Updated: 06/17/25 1158     Magnesium 2.5 mg/dL     High Sensitivity Troponin T [[id number]]  (Normal) Collected: 06/17/25 1105    Specimen: Blood from Arm, Right Updated: 06/17/25 1227     HS Troponin T <6 ng/L     Narrative:      High Sensitive Troponin T Reference Range:  <14.0 ng/L- Negative Female for AMI  <22.0 ng/L- Negative Male for AMI  >=14 - Abnormal Female indicating possible myocardial injury.  >=22 - Abnormal Male indicating possible myocardial injury.   Clinicians would have to utilize clinical acumen, EKG, Troponin, and serial changes to determine if it is an Acute Myocardial Infarction or myocardial injury due to an underlying chronic condition.         Urinalysis With Culture If Indicated - Urine, Clean Catch [[id number]]  (Abnormal) Collected: 06/17/25 1108    Specimen: Urine, Clean Catch Updated: 06/17/25 1144     Color, UA Dark Yellow     Appearance, UA Turbid     pH, UA 6.0     Specific Gravity, UA 1.030     Glucose, UA Negative     Ketones, UA Trace     Bilirubin, UA Negative     Blood, UA Negative     Protein, UA Negative     Leuk Esterase, UA Small (1+)     Nitrite, UA Negative     Urobilinogen, UA 1.0 E.U./dL    Narrative:      In absence of clinical symptoms, the presence of pyuria, bacteria, and/or nitrites on the urinalysis result does not correlate with infection.    Urinalysis, Microscopic Only - Urine, Clean Catch [[id number]]  (Abnormal) Collected: 06/17/25 1108    Specimen: Urine, Clean Catch Updated: 06/17/25 1152      RBC, UA 0-2 /HPF      WBC, UA 0-2 /HPF      Comment: Urine culture not indicated.        Bacteria, UA Trace /HPF      Squamous Epithelial Cells, UA 3-6 /HPF      Hyaline Casts, UA None Seen /LPF      Uric Acid Crystals, UA Large/3+ /HPF      Methodology Manual Light Microscopy    High Sensitivity Troponin T 1Hr [[id number]] Collected: 06/17/25 1248    Specimen: Blood Updated: 06/17/25 1314     HS Troponin T <6 ng/L      Troponin T Numeric Delta --     Comment: Unable to calculate.       Narrative:      High Sensitive Troponin T Reference Range:  <14.0 ng/L- Negative Female for AMI  <22.0 ng/L- Negative Male for AMI  >=14 - Abnormal Female indicating possible myocardial injury.  >=22 - Abnormal Male indicating possible myocardial injury.   Clinicians would have to utilize clinical acumen, EKG, Troponin, and serial changes to determine if it is an Acute Myocardial Infarction or myocardial injury due to an underlying chronic condition.                  Imaging:    MRI Brain Without Contrast  Result Date: 6/17/2025  MRI BRAIN WO CONTRAST Date of Exam: 6/17/2025 3:45 PM EDT Indication: hypoattenuation of cerebrellum, dizzy, reported slurred speech.  Comparison: CT head from earlier today and MRI brain from August 12, 2021 Technique:  Routine multiplanar/multisequence sequence images of the brain were obtained without contrast administration. Findings: There are a couple acute infarctions involving the right cerebellum. There is no hemorrhagic transformation. The ventricles are stable in caliber, with no midline shift. The basal cisterns appear patent. Subtle foci of periventricular and subcortical white matter FLAIR hyperintensities are nonspecific, but likely the sequela of mild chronic small vessel ischemic disease. The midline structures appear intact. The globes and orbits appear intact. The intracranial vascular flow-voids appear patent. There is a partial right mastoid effusion.     Impression: 1.Acute infarctions  involving right cerebellum. No hemorrhagic transformation or significant mass effect. 2.Findings suggestive of mild chronic small vessel ischemic disease. 3.Partial right mastoid effusion. Electronically Signed: Enrrique Blanchard MD  6/17/2025 4:13 PM EDT  Workstation ID: [id number]    XR Chest 1 View  Result Date: 6/17/2025  XR CHEST 1 VW Date of Exam: 6/17/2025 12:26 PM EDT Indication: weak dizzy Comparison: Chest CT 3/12/2025, chest radiograph 11/20/2020. Findings: Cardiomediastinal silhouette is within normal limits. No focal consolidation. No pleural effusion or pneumothorax. Osseous structures are unremarkable.     Impression: No acute cardiopulmonary findings. Electronically Signed: Man Suarez MD  6/17/2025 12:50 PM EDT  Workstation ID: [id number]    CT Head Without Contrast  Result Date: 6/17/2025  CT HEAD WO CONTRAST Date of Exam: 6/17/2025 12:08 PM EDT Indication: dizzy x 1 week. Comparison: Brain MRI 8/12/2021. Technique: Axial CT images were obtained of the head without contrast administration.  Reconstructed coronal and sagittal images were also obtained. Automated exposure control and iterative construction methods were used. Findings: Small linear area of hypoattenuation in the right cerebellum without distinct correlate on prior brain MRI. No evidence of acute intracranial hemorrhage or mass effect. No extra-axial collection. Ventricles and sulci are symmetric. The mastoid air cells and paranasal sinuses are well aerated. Globes and extraocular muscles are unremarkable. No acute or suspicious osseous abnormality. Soft tissues within normal limits.     Impression: Small linear area of hypoattenuation within the right cerebellum, not present on prior brain MRI but without acute features by CT and may represent sequela of prior insult. No acute intracranial findings otherwise. Brain MRI could be considered for further evaluation if felt to be clinically indicated. Electronically Signed: Man Suarez MD   6/17/2025 12:49 PM EDT  Workstation ID: [id number]      MDM:    Procedures    Labs were collected in the emergency department and all labs were reviewed and interpreted by me.  X-ray were performed in the emergency department and all X-ray impressions were independently interpreted by me.  An EKG was performed and the EKG was interpreted by me.  CT scan was performed in the emergency department and the CT scan radiology impression was interpreted by me.  MRI was performed in the emergency department and the MRI impression was interpreted by me.                      Micha Negron DO  18:17 EDT  06/17/25         Micha Negron DO  06/17/25 1817      Electronically signed by Micha Negron DO at 06/17/25 1817       Deanna Tomlinson PA-C at 06/17/25 1118       Attestation signed by Micha Negron DO at 06/17/25 1817            Micha Negron DO 6/17/2025 18:17 EDT                             Time: 11:18 AM EDT  Date of encounter:  6/17/2025  Independent Historian/Clinical History and Information was obtained by:   Patient    History is limited by: N/A    Chief Complaint: Dizziness, slurred speech      History of Present Illness:  Patient is a 54 y.o. year old female who presents to the emergency department for evaluation of dizziness and slurred speech.  Patient reports symptoms have been present for over 1 week.  She denies any head injuries or falls.  Reports that she was seen by her PCP last week and started on antibiotics for an ear infection.  Patient reports she did not have any ear pain at the time.  Continues to not have ear pain.  She reports that she remains dizzy, has been trying meclizine at home with minimal relief.  She reports that when symptoms started she also began slurring her words.  She denies alcohol or drug use.  She reports that she is having difficulty with walking because of dizziness.  Reports symptoms are the worst when she is changing positions.  She denies any tinnitus, headache, vision changes.  No neck  pain.  She denies unilateral weakness.  She denies facial droop.  She denies chest pain or shortness of breath.  No leg swelling.  Does report bilateral leg cramping over the past week as well.      Patient Care Team  Primary Care Provider: Andrés Marcial MD    Past Medical History:     No Known Allergies  Past Medical History:   Diagnosis Date   • Anxiety    • Clotting disorder 2023   • Depression    • GERD (gastroesophageal reflux disease)    • Gross hematuria    • Kidney stone    • Muscle spasm    • Ureteral stone    • Urinary tract infection 2023    Blood in urine     Past Surgical History:   Procedure Laterality Date   •  SECTION     • ENDOMETRIAL ABLATION     • INGUINAL HERNIA REPAIR     • LAPAROSCOPIC TUBAL LIGATION     • TUBAL ABDOMINAL LIGATION  05   • URETEROSCOPY LASER LITHOTRIPSY WITH STENT INSERTION Right 2023    Procedure: CYSTOSCOPY right URETEROSCOPY  HOLMIUM LASER STENT INSERTION, BILATERAL RETROGRADE;  Surgeon: Samra Mac MD;  Location: [facility] OR;  Service: Urology;  Laterality: Right;     Family History   Problem Relation Age of Onset   • Hypertension Mother    • Depression Mother    • Diabetes Mother    • Anxiety disorder Mother    • Kidney disease Mother    • Multiple sclerosis Sister    • No Known Problems Daughter    • No Known Problems Son    • No Known Problems Son    • Arthritis Other    • COPD Other    • Diabetes type II Other    • Malig Hyperthermia Neg Hx        Home Medications:  Prior to Admission medications    Medication Sig Start Date End Date Taking? Authorizing Provider   cyclobenzaprine (FLEXERIL) 5 MG tablet Take 1 tablet by mouth 3 (Three) Times a Day As Needed for Muscle Spasms. 24   Andrés Marcial MD   diclofenac (VOLTAREN) 50 MG EC tablet Take 1 tablet by mouth 2 (Two) Times a Day As Needed (pain). for pain 24   Andrés Marcial MD   ibuprofen (ADVIL,MOTRIN) 800 MG tablet Take 1 tablet by mouth.  11/14/24   Provider, MD Tianna   meclizine (Medi-Meclizine) 25 MG tablet Take 1 tablet by mouth 3 (Three) Times a Day As Needed for Dizziness. 6/9/25   Julia Sunshine APRN   ondansetron (ZOFRAN) 8 MG tablet Take 1 tablet by mouth Every 8 (Eight) Hours As Needed for Nausea or Vomiting. 6/9/25   Julia Sunshine APRN   QUEtiapine XR (SEROquel XR) 200 MG 24 hr tablet Take 1 tablet by mouth Every Night. 12/19/24   Andrés Marcial MD   sertraline (ZOLOFT) 100 MG tablet Take 1 tablet by mouth Daily. 12/19/24   Andrés Marcial MD   amoxicillin-clavulanate (AUGMENTIN) 875-125 MG per tablet Take 1 tablet by mouth 2 (Two) Times a Day for 7 days. 6/9/25 6/17/25  Julia Sunshine APRN        Social History:   Social History     Tobacco Use   • Smoking status: Every Day     Current packs/day: 1.00     Average packs/day: 1 pack/day for 37.6 years (37.6 ttl pk-yrs)     Types: Cigarettes     Start date: 11/19/1987     Passive exposure: Current   • Smokeless tobacco: Never   Vaping Use   • Vaping status: Never Used   Substance Use Topics   • Alcohol use: Yes     Comment: occasionally   • Drug use: Never         Review of Systems:  Review of Systems   Constitutional:  Negative for activity change, appetite change, chills, fatigue and fever.   HENT:  Negative for congestion, ear discharge, ear pain, nosebleeds, sinus pressure, sneezing, sore throat, tinnitus and voice change.    Eyes:  Negative for photophobia, pain and visual disturbance.   Respiratory:  Negative for cough, shortness of breath and wheezing.    Cardiovascular:  Negative for chest pain, palpitations and leg swelling.   Gastrointestinal:  Negative for abdominal pain, diarrhea, nausea and vomiting.   Endocrine: Negative for polyuria.   Genitourinary:  Negative for difficulty urinating, dysuria, flank pain and hematuria.   Musculoskeletal:  Positive for gait problem. Negative for arthralgias, back pain, joint swelling, myalgias, neck pain and neck  "stiffness.   Skin:  Negative for color change, rash and wound.   Allergic/Immunologic: Negative for immunocompromised state.   Neurological:  Positive for dizziness and speech difficulty (Slurred). Negative for tremors, seizures, syncope, facial asymmetry, weakness, light-headedness, numbness and headaches.   Hematological:  Negative for adenopathy. Does not bruise/bleed easily.   Psychiatric/Behavioral:  Negative for agitation, confusion and decreased concentration.         Physical Exam:  /81   Pulse 70   Temp 98.1 °F (36.7 °C) (Oral)   Resp 18   Ht 167.6 cm (66\")   Wt 68.5 kg (151 lb 0.2 oz)   SpO2 95%   Breastfeeding No   BMI 24.37 kg/m²     Physical Exam  Vitals and nursing note reviewed.   Constitutional:       General: She is not in acute distress.     Appearance: Normal appearance. She is not ill-appearing, toxic-appearing or diaphoretic.   HENT:      Head: Normocephalic and atraumatic.      Right Ear: Tympanic membrane, ear canal and external ear normal.      Left Ear: Tympanic membrane, ear canal and external ear normal.      Nose: Nose normal.      Mouth/Throat:      Mouth: Mucous membranes are moist.      Pharynx: Oropharynx is clear.   Eyes:      Extraocular Movements: Extraocular movements intact.      Conjunctiva/sclera: Conjunctivae normal.      Pupils: Pupils are equal, round, and reactive to light.   Cardiovascular:      Rate and Rhythm: Normal rate and regular rhythm.      Pulses: Normal pulses.      Heart sounds: Normal heart sounds. No murmur heard.     No friction rub. No gallop.   Pulmonary:      Effort: Pulmonary effort is normal. No respiratory distress.      Breath sounds: Normal breath sounds. No wheezing, rhonchi or rales.   Abdominal:      General: There is no distension.      Palpations: Abdomen is soft.      Tenderness: There is no abdominal tenderness. There is no guarding or rebound.   Musculoskeletal:         General: Normal range of motion.      Cervical back: Normal " range of motion and neck supple.      Right lower leg: No edema.      Left lower leg: No edema.   Skin:     General: Skin is warm and dry.      Capillary Refill: Capillary refill takes less than 2 seconds.   Neurological:      General: No focal deficit present.      Mental Status: She is alert and oriented to person, place, and time.      GCS: GCS eye subscore is 4. GCS verbal subscore is 5. GCS motor subscore is 6.      Cranial Nerves: Cranial nerves 2-12 are intact. No cranial nerve deficit.      Sensory: No sensory deficit.      Motor: No weakness.      Coordination: Romberg sign negative. Coordination normal. Finger-Nose-Finger Test normal.      Comments: No neurological deficit.  No slurred speech appreciated.  No facial drooping.  Normal coordination.  Negative Romberg.   Psychiatric:         Mood and Affect: Mood normal.         Behavior: Behavior normal.                    Medical Decision Making:      Comorbidities that affect care:    Anxiety, depression    External Notes reviewed:    Previous Clinic Note: Seen in clinic today for dizziness, completed antibiotics, reported slurred speech and eligible handwriting.  Labs and stat CT were ordered.  Previous clinic note: Patient was seen on 6/9 at her PCP office for dizziness, reported sensation of imbalance which she felt like was intoxication.  On physical examination it was charted that patient's bilateral TMs were injected.  She was started on Augmentin, meclizine Zofran and Afrin.    The following orders were placed and all results were independently analyzed by me:  Orders Placed This Encounter   Procedures   • XR Chest 1 View   • CT Head Without Contrast   • MRI Brain Without Contrast   • [place] Draw   • Comprehensive Metabolic Panel   • Urinalysis With Culture If Indicated -   • CBC Auto Differential   • Urinalysis, Microscopic Only - Urine, Clean Catch   • hCG, Quantitative, Pregnancy   • TSH Rfx On Abnormal To Free T4   • Magnesium   • High  Sensitivity Troponin T   • High Sensitivity Troponin T 1Hr   • Protime-INR   • NPO Diet NPO Type: Strict NPO   • Undress & Gown   • Continuous Pulse Oximetry   • Vital Signs   • Orthostatic Blood Pressure   • Orthostatic Vitals (Blood Pressure & Heart Rate)   • IP General Consult (Use specialty-specific consult if known)   • Inpatient Hospitalist Consult   • PT Consult: Eval & Treat Functional Mobility Below Baseline   • PT Plan of Care Cert / Re-Cert   • Oxygen Therapy- Nasal Cannula; Titrate 1-6 LPM Per SpO2; 90 - 95%   • POC Glucose Once   • ECG 12 Lead ED Triage Standing Order; Weak / Dizzy / AMS   • Insert Peripheral IV   • Fall Precautions   • CBC & Differential   • Green Top (Gel)   • Lavender Top   • Gold Top - SST   • Light Blue Top       Medications Given in the Emergency Department:  Medications   sodium chloride 0.9 % flush 10 mL (has no administration in time range)   sodium chloride 0.9 % bolus 1,000 mL (0 mL Intravenous Stopped 6/17/25 1321)        ED Course:    ED Course as of 06/17/25 1755   Tue Jun 17, 2025   1448 Patient was assessed by physical therapist for evaluation of BPPV.  This testing was negative.  She also tested patient for vestibular neuritis which was negative. [AS]   1652 MRI Brain Without Contrast  Acute infarctions of cerebellum. [AS]   1746 Neurologist on-call states patient will need admission for stroke workup. [AS]      ED Course User Index  [AS] Deanna Tomlinson, DAY       Labs:    Lab Results (last 24 hours)       Procedure Component Value Units Date/Time    CBC & Differential [[id number]]  (Abnormal) Collected: 06/17/25 1105    Specimen: Blood from Arm, Right Updated: 06/17/25 1114    Narrative:      The following orders were created for panel order CBC & Differential.  Procedure                               Abnormality         Status                     ---------                               -----------         ------                     CBC Auto  Differential[[id number]]        Abnormal            Final result                 Please view results for these tests on the individual orders.    Comprehensive Metabolic Panel [[id number]]  (Abnormal) Collected: 06/17/25 1105    Specimen: Blood from Arm, Right Updated: 06/17/25 1133     Glucose 142 mg/dL      BUN 17.7 mg/dL      Creatinine 1.14 mg/dL      Sodium 133 mmol/L      Potassium 4.9 mmol/L      Chloride 97 mmol/L      CO2 26.4 mmol/L      Calcium 9.6 mg/dL      Total Protein 7.4 g/dL      Albumin 4.7 g/dL      ALT (SGPT) 9 U/L      AST (SGOT) 24 U/L      Alkaline Phosphatase 99 U/L      Total Bilirubin 0.4 mg/dL      Globulin 2.7 gm/dL      A/G Ratio 1.7 g/dL      BUN/Creatinine Ratio 15.5     Anion Gap 9.6 mmol/L      eGFR 57.7 mL/min/1.73     Narrative:      GFR Categories in Chronic Kidney Disease (CKD)              GFR Category          GFR (mL/min/1.73)    Interpretation  G1                    90 or greater        Normal or high (1)  G2                    60-89                Mild decrease (1)  G3a                   45-59                Mild to moderate decrease  G3b                   30-44                Moderate to severe decrease  G4                    15-29                Severe decrease  G5                    14 or less           Kidney failure    (1)In the absence of evidence of kidney disease, neither GFR category G1 or G2 fulfill the criteria for CKD.    eGFR calculation 2021 CKD-EPI creatinine equation, which does not include race as a factor    CBC Auto Differential [[id number]]  (Abnormal) Collected: 06/17/25 1105    Specimen: Blood from Arm, Right Updated: 06/17/25 1114     WBC 8.73 10*3/mm3      RBC 5.32 10*6/mm3      Hemoglobin 17.1 g/dL      Hematocrit 50.8 %      MCV 95.5 fL      MCH 32.1 pg      MCHC 33.7 g/dL      RDW 13.2 %      RDW-SD 46.7 fl      MPV 9.7 fL      Platelets 304 10*3/mm3      Neutrophil % 59.0 %      Lymphocyte % 32.1 %      Monocyte % 5.5 %      Eosinophil % 2.1 %       Basophil % 1.1 %      Immature Grans % 0.2 %      Neutrophils, Absolute 5.15 10*3/mm3      Lymphocytes, Absolute 2.80 10*3/mm3      Monocytes, Absolute 0.48 10*3/mm3      Eosinophils, Absolute 0.18 10*3/mm3      Basophils, Absolute 0.10 10*3/mm3      Immature Grans, Absolute 0.02 10*3/mm3      nRBC 0.0 /100 WBC     hCG, Quantitative, Pregnancy [[id number]] Collected: 06/17/25 1105    Specimen: Blood from Arm, Right Updated: 06/17/25 1205     HCG Quantitative 3.06 mIU/mL     Narrative:      HCG Ranges by Gestational Age    Females - non-pregnant premenopausal   </= 1mIU/mL HCG  Females - postmenopausal               </= 7mIU/mL HCG    3 Weeks       5.4   -      72 mIU/mL  4 Weeks      10.2   -     708 mIU/mL  5 Weeks       217   -   8,245 mIU/mL  6 Weeks       152   -  32,177 mIU/mL  7 Weeks     4,059   - 153,767 mIU/mL  8 Weeks    31,366   - 149,094 mIU/mL  9 Weeks    59,109   - 135,901 mIU/mL  10 Weeks   44,186   - 170,409 mIU/mL  12 Weeks   27,107   - 201,615 mIU/mL  14 Weeks   24,302   -  93,646 mIU/mL  15 Weeks   12,540   -  69,747 mIU/mL  16 Weeks    8,904   -  55,332 mIU/mL  17 Weeks    8,240   -  51,793 mIU/mL  18 Weeks    9,649   -  55,271 mIU/mL      TSH Rfx On Abnormal To Free T4 [[id number]]  (Normal) Collected: 06/17/25 1105    Specimen: Blood from Arm, Right Updated: 06/17/25 1214     TSH 0.913 uIU/mL     Magnesium [[id number]]  (Normal) Collected: 06/17/25 1105    Specimen: Blood from Arm, Right Updated: 06/17/25 1158     Magnesium 2.5 mg/dL     High Sensitivity Troponin T [[id number]]  (Normal) Collected: 06/17/25 1105    Specimen: Blood from Arm, Right Updated: 06/17/25 1227     HS Troponin T <6 ng/L     Narrative:      High Sensitive Troponin T Reference Range:  <14.0 ng/L- Negative Female for AMI  <22.0 ng/L- Negative Male for AMI  >=14 - Abnormal Female indicating possible myocardial injury.  >=22 - Abnormal Male indicating possible myocardial injury.   Clinicians would have to utilize clinical  acumen, EKG, Troponin, and serial changes to determine if it is an Acute Myocardial Infarction or myocardial injury due to an underlying chronic condition.         Urinalysis With Culture If Indicated - Urine, Clean Catch [[id number]]  (Abnormal) Collected: 06/17/25 1108    Specimen: Urine, Clean Catch Updated: 06/17/25 1144     Color, UA Dark Yellow     Appearance, UA Turbid     pH, UA 6.0     Specific Gravity, UA 1.030     Glucose, UA Negative     Ketones, UA Trace     Bilirubin, UA Negative     Blood, UA Negative     Protein, UA Negative     Leuk Esterase, UA Small (1+)     Nitrite, UA Negative     Urobilinogen, UA 1.0 E.U./dL    Narrative:      In absence of clinical symptoms, the presence of pyuria, bacteria, and/or nitrites on the urinalysis result does not correlate with infection.    Urinalysis, Microscopic Only - Urine, Clean Catch [[id number]]  (Abnormal) Collected: 06/17/25 1108    Specimen: Urine, Clean Catch Updated: 06/17/25 1150     RBC, UA 0-2 /HPF      WBC, UA 0-2 /HPF      Comment: Urine culture not indicated.        Bacteria, UA Trace /HPF      Squamous Epithelial Cells, UA 3-6 /HPF      Hyaline Casts, UA None Seen /LPF      Uric Acid Crystals, UA Large/3+ /HPF      Methodology Manual Light Microscopy    High Sensitivity Troponin T 1Hr [[id number]] Collected: 06/17/25 1248    Specimen: Blood Updated: 06/17/25 1314     HS Troponin T <6 ng/L      Troponin T Numeric Delta --     Comment: Unable to calculate.       Narrative:      High Sensitive Troponin T Reference Range:  <14.0 ng/L- Negative Female for AMI  <22.0 ng/L- Negative Male for AMI  >=14 - Abnormal Female indicating possible myocardial injury.  >=22 - Abnormal Male indicating possible myocardial injury.   Clinicians would have to utilize clinical acumen, EKG, Troponin, and serial changes to determine if it is an Acute Myocardial Infarction or myocardial injury due to an underlying chronic condition.                  Imaging:    MRI Brain  Without Contrast  Result Date: 6/17/2025  MRI BRAIN WO CONTRAST Date of Exam: 6/17/2025 3:45 PM EDT Indication: hypoattenuation of cerebrellum, dizzy, reported slurred speech.  Comparison: CT head from earlier today and MRI brain from August 12, 2021 Technique:  Routine multiplanar/multisequence sequence images of the brain were obtained without contrast administration. Findings: There are a couple acute infarctions involving the right cerebellum. There is no hemorrhagic transformation. The ventricles are stable in caliber, with no midline shift. The basal cisterns appear patent. Subtle foci of periventricular and subcortical white matter FLAIR hyperintensities are nonspecific, but likely the sequela of mild chronic small vessel ischemic disease. The midline structures appear intact. The globes and orbits appear intact. The intracranial vascular flow-voids appear patent. There is a partial right mastoid effusion.     Impression: 1.Acute infarctions involving right cerebellum. No hemorrhagic transformation or significant mass effect. 2.Findings suggestive of mild chronic small vessel ischemic disease. 3.Partial right mastoid effusion. Electronically Signed: Enrrique Blanchard MD  6/17/2025 4:13 PM EDT  Workstation ID: [id number]    XR Chest 1 View  Result Date: 6/17/2025  XR CHEST 1 VW Date of Exam: 6/17/2025 12:26 PM EDT Indication: weak dizzy Comparison: Chest CT 3/12/2025, chest radiograph 11/20/2020. Findings: Cardiomediastinal silhouette is within normal limits. No focal consolidation. No pleural effusion or pneumothorax. Osseous structures are unremarkable.     Impression: No acute cardiopulmonary findings. Electronically Signed: Man Suarez MD  6/17/2025 12:50 PM EDT  Workstation ID: [id number]    CT Head Without Contrast  Result Date: 6/17/2025  CT HEAD WO CONTRAST Date of Exam: 6/17/2025 12:08 PM EDT Indication: dizzy x 1 week. Comparison: Brain MRI 8/12/2021. Technique: Axial CT images were obtained of the head  without contrast administration.  Reconstructed coronal and sagittal images were also obtained. Automated exposure control and iterative construction methods were used. Findings: Small linear area of hypoattenuation in the right cerebellum without distinct correlate on prior brain MRI. No evidence of acute intracranial hemorrhage or mass effect. No extra-axial collection. Ventricles and sulci are symmetric. The mastoid air cells and paranasal sinuses are well aerated. Globes and extraocular muscles are unremarkable. No acute or suspicious osseous abnormality. Soft tissues within normal limits.     Impression: Small linear area of hypoattenuation within the right cerebellum, not present on prior brain MRI but without acute features by CT and may represent sequela of prior insult. No acute intracranial findings otherwise. Brain MRI could be considered for further evaluation if felt to be clinically indicated. Electronically Signed: Man Suarez MD  6/17/2025 12:49 PM EDT  Workstation ID: [id number]        Differential Diagnosis and Discussion:    Dizziness: Based on the patient's history, signs, and symptoms, the diffential diagnosis includes but is not limited to meningitis, stroke, sepsis, subarachnoid hemorrhage, intracranial bleeding, encephalitis, vertigo, electrolyte imbalance, and metabolic disorders.    PROCEDURES:    Labs were collected in the emergency department and all labs were reviewed and interpreted by me.  X-ray were performed in the emergency department and all X-ray impressions were independently interpreted by me.  CT scan was performed in the emergency department and the CT scan radiology impression was interpreted by me.  MRI was performed in the emergency department and the MRI impression was interpreted by me.     ECG 12 Lead ED Triage Standing Order; Weak / Dizzy / AMS   Preliminary Result   HEART RATE=69  bpm   RR Czjldnpf=470  ms   WV Ishkoglw=519  ms   P Horizontal Axis=-1  deg   P Front  Axis=59  deg   QRSD Interval=97  ms   QT Qxuydnzo=741  ms   TMwS=564  ms   QRS Axis=29  deg   T Wave Axis=26  deg   - ABNORMAL ECG -   Sinus rhythm   Inferior infarct, old   Date and Time of Study:2025-06-17 11:13:01          Procedures    MDM  Number of Diagnoses or Management Options  Cerebellar stroke, acute  Dizziness  Diagnosis management comments: This is a 54-year-old female with history of anxiety and depression, current cigarette smoker presenting to the ED with around 1 weeks worth of dizziness.  She also reports slurred speech and difficulty with ambulation because of the dizziness.  Also having leg cramps.  Was seen 1 week ago when symptoms started by her PCP and was started on Augmentin for an ear infection.  Patient has not had any ear pain or drainage during this episode.  On my physical examination there is no evidence of otitis media.  Neurological exam does not reveal any focal deficits, and I do not appreciate any slurred speech on my exam.  I did have her evaluated by our physical therapist for concern of BPPV or vestibular neuritis, these preliminary testing were negative through PT.  Laboratory evaluation reveals mild concentration of RBCs, Hgb and HCT mildly elevated.  CMP was evaluated and does reveal mild elevation of creatinine with mild hyponatremia of 133, I did order patient 1 L normal saline.  Urine unremarkable.  Troponin negative.  CT head was obtained, there was a finding of a linear area of hypoattenuation in the cerebellum with recommendation for MRI.  On my reassessment of this patient she continues to have symptoms in which she describes dizziness and slurred speech.  I again do not appreciate any neurological deficit but MRI was ordered without contrast.  Per radiologist there are areas of acute infarcts within patient's right cerebellum.  I discussed this with patient.  I have placed a stat consult for neurology.  Blood pressure has remained appropriate, 130s to 140s systolic  over 80s.  Teleneurology consulted on this patient and recommend admission for stroke workup.  Hospitalist consulted for admission.       Amount and/or Complexity of Data Reviewed  Clinical lab tests: reviewed and ordered  Tests in the radiology section of CPT®: reviewed and ordered  Tests in the medicine section of CPT®: reviewed and ordered  Obtain history from someone other than the patient: yes  Review and summarize past medical records: yes  Discuss the patient with other providers: yes  Independent visualization of images, tracings, or specimens: yes    Risk of Complications, Morbidity, and/or Mortality  Presenting problems: moderate  Diagnostic procedures: high  Management options: moderate    Patient Progress  Patient progress: stable             The patient presents with symptoms concerning for a stroke. The patient was evaluated by me immediately upon arrival. Extensive history and physical was obtained. Family present to give further insight into patients onset of symptoms.  CT scan findings were discussed with radiology. The case was also discussed at length with telehealth neurology. Nursing staff was instructed on how to proceed with patient care. Patient was then placed on the cardiac monitor and monitored for arrhythmia that could be contributing to stroke like symptoms. EKG was performed and evaluated by me. Patient's blood pressure was monitored and controled consistent with stroke guidelines. Treatment option's for patient's symptoms include admission for stroke workup, start aspirin after inclusion and exclusion criteria was weighed. The patient's mental status and neurological symptoms were monitored throughout their stay for worsening decline.     Total Critical Care time of 35 minutes. Total critical care time documented does not include time spent on separately billed procedures for services of nurses or physician assistants. I personally saw and examined the patient. I have reviewed all  diagnostic interpretations and treatment plans as written. I was present for the key portions of any procedures performed and the inclusive time noted in any critical care statement. Critical care time includes patient management by me, time spent at the patients bedside,  time to review lab and imaging results, discussing patient care, documentation in the medical record, and time spent with family or caregiver.          Patient Care Considerations:    SEPSIS was considered but is NOT present in the emergency department as SIRS criteria is not present.      Consultants/Shared Management Plan:    Hospitalist: I have discussed the case with Dr. Hare who agrees to accept the patient for admission.  Consultant: I have discussed the case with teleneurology who states recommend admission for stroke workup.    Social Determinants of Health:    Patient is independent, reliable, and has access to care.       Disposition and Care Coordination:    Admit:   Through independent evaluation of the patient's history, physical, and imperical data, the patient meets criteria for inpatient admission to the hospital.        Final diagnoses:   Dizziness   Cerebellar stroke, acute        ED Disposition       ED Disposition   Intended Admit    Condition   --    Comment   --               This medical record created using voice recognition software.             Deanna Tomlinson PA-C  06/17/25 1756      Electronically signed by Micha Negron DO at 06/17/25 1817       Current Facility-Administered Medications   Medication Dose Route Frequency Provider Last Rate Last Admin   • aspirin chewable tablet 81 mg  81 mg Oral Daily Alex Hare MD   81 mg at 06/18/25 0906    Or   • aspirin suppository 300 mg  300 mg Rectal Daily Alex Hare MD       • atorvastatin (LIPITOR) tablet 80 mg  80 mg Oral Nightly Alex Hare MD   80 mg at 06/17/25 2301   • enoxaparin sodium (LOVENOX) syringe 40 mg  40 mg Subcutaneous Daily Ananya  Alex Saini MD   40 mg at 06/18/25 0906   • QUEtiapine fumarate ER (SEROquel XR) tablet 200 mg  200 mg Oral Nightly Alex Hare MD       • sertraline (ZOLOFT) tablet 100 mg  100 mg Oral Daily Alex Hare MD   100 mg at 06/18/25 0906   • sodium chloride 0.9 % flush 10 mL  10 mL Intravenous PRN Alex Hare MD       • sodium chloride 0.9 % flush 10 mL  10 mL Intravenous Q12H Alex Hare MD   10 mL at 06/18/25 0907   • sodium chloride 0.9 % flush 10 mL  10 mL Intravenous PRN Alex Hare MD       • sodium chloride 0.9 % infusion 40 mL  40 mL Intravenous PRN Alex Hare MD         Physician Progress Notes (last 24 hours)  Notes from 06/17/25 1040 through 06/18/25 1040   No notes of this type exist for this encounter.          Consult Notes (last 24 hours)        Roberto Brown MD at 06/17/25 1748          [facility]  [facility]    Stat Consult    Patient Name:   Rosie Medrano  YOB: 1970  Identification Number:   MRN - [id number]  Date of Service:   06/17/2025 16:51:29    Diagnosis:      •  I63.00 - Cerebrovascular accident (CVA) due to thrombosis of precerebral artery (HCCC)    Impression  Patient is a 54-year-old lady with no significant past medical history presents for evaluation of slurred speech and dizziness.    Assessment  Right cerebellar infarct    Pt will be admitted for stroke work up.         Recommendations:  Our recommendations are outlined below.    Diagnostic Studies :  CTA head and neck with contrast    Laboratory Studies :  Lipid panel  I ordered  Hemoglobin A1c    Antithrombotic Medication :  Aspirin 81 mg PO daily    Nursing Recommendations :  IV Fluids, avoid dextrose containing fluids, Maintain euglycemia  Neuro checks q4 hrs x 24 hrs and then per shift  Head of bed 30 degrees  Continue with Telemetry    Consultations :  Recommend Speech therapy if failed dysphagia screen  Physical  therapy/Occupational therapy    DVT Prophylaxis :  Choice of Primary Team    Disposition :  Neurology will follow        ----------------------------------------------------------------------------------------------------      Advanced Imaging:  Advanced Imaging Deferred because:    Pt will be admitted for MRI        Metrics:  Dispatch Time: 06/17/2025 16:50:21  Callback Response Time: 06/17/2025 16:52:08    Primary Provider Notified of Diagnostic Impression and Management Plan on: 06/17/2025 17:41:57      CT HEAD:  I personally reviewed all the CT images that were available to me and it showed: Small linear area of hypoattenuation within the right cerebellum, not present on prior brain MRI but without acute features by CT and may represent sequela of prior insult. No acute intracranial findings otherwise. Brain MRI could be considered for      Imaging  MRI brain  1.Acute infarctions involving right cerebellum. No hemorrhagic transformation or significant mass effect.  2.Findings suggestive of mild chronic small vessel ischemic disease.      ----------------------------------------------------------------------------------------------------    Chief Complaint:  dizzy, slurred speech    History of Present Illness:  Patient is a 54 year old Female.  Patient is a 54-year-old lady with no significant past medical history presents for evaluation of slurred speech and dizziness.  She reports that the symptoms began 1 week ago. She presents to the hospital today. MRI brain was done which revealed a right cerebellar stroke.       Past Medical History:      • There is no history of Hypertension      • There is no history of Diabetes Mellitus      • There is no history of Hyperlipidemia      • There is no history of Atrial Fibrillation      • There is no history of Coronary Artery Disease      • There is no history of Stroke      • There is no history of Covid-19      • There is no history of Seizures      • There is no  history of Migraine Headaches      • There is no history of Dementia/MCI    Medications:    No Anticoagulant use   No Antiplatelet use  Reviewed EMR for current medications    Allergies:   Reviewed    Social History:  Smoking: No  Alcohol Use: No  Drug Use: No    Family History:    There is no family history of premature cerebrovascular disease pertinent to this consultation    ROS :  14 Points Review of Systems was performed and was negative except mentioned in HPI.    Past Surgical History:  There Is No Surgical History Contributory To Today’s Visit       Examination:  BP(131/81), Pulse(68),  1A: Level of Consciousness - Alert; keenly responsive + 0  1B: Ask Month and Age - Both Questions Right + 0  1C: Blink Eyes & Squeeze Hands - Performs Both Tasks + 0  2: Test Horizontal Extraocular Movements - Normal + 0  3: Test Visual Fields - No Visual Loss + 0  4: Test Facial Palsy (Use Grimace if Obtunded) - Normal symmetry + 0  5A: Test Left Arm Motor Drift - No Drift for 10 Seconds + 0  5B: Test Right Arm Motor Drift - No Drift for 10 Seconds + 0  6A: Test Left Leg Motor Drift - No Drift for 5 Seconds + 0  6B: Test Right Leg Motor Drift - No Drift for 5 Seconds + 0  7: Test Limb Ataxia (FNF/Heel-Shin) - No Ataxia + 0  8: Test Sensation - Normal; No sensory loss + 0  9: Test Language/Aphasia - Normal; No aphasia + 0  10: Test Dysarthria - Mild-Moderate Dysarthria: Slurring but can be understood + 1  11: Test Extinction/Inattention - No abnormality + 0    NIHSS Score: 1    Spoke with : Stefany        This consult was conducted in real time using interactive audio and video technology. Patient was informed of the technology being used for this visit and agreed to proceed. Patient located in hospital and provider located at home/office setting.    Patient is being evaluated for possible acute neurologic impairment and high probability of imminent or life - threatening deterioration.I spent total of 35 minutes providing care  to this patient, including time for face to face visit via telemedicine, review of medical records, imaging studies and discussion of findings with providers, the patient and / or family.      Dr Roberto Brown      [facility]  For Inpatient follow-up with [facility] physician please call [facility] at [phone]. As we are not an outpatient service for any post hospital discharge needs please contact the hospital for assistance.    If you have any questions for the [facility] physicians or need to reconsult for clinical or diagnostic changes please contact us via [facility] at [phone].    Electronically signed by Roberto Brown MD at 06/17/25 4712

## 2025-06-18 NOTE — THERAPY EVALUATION
Acute Care - Speech Language Pathology   Swallow Initial Evaluation [facility]     Patient Name: Rosie Medrano  : 1970  MRN: [id number]  Today's Date: 2025               Admit Date: 2025    Visit Dx:     ICD-10-CM ICD-9-CM   1. Cerebellar stroke, acute  I63.9 434.91   2. Dizziness  R42 780.4   3. Dysphagia, oropharyngeal  R13.12 787.22     Patient Active Problem List   Diagnosis    Depression    Major depressive disorder, single episode, unspecified    Arthritis    Primary hypertension    Migraine without status migrainosus, not intractable    Right ureteral stone    Gross hematuria    Mixed hyperlipidemia    CVA (cerebral vascular accident)     Past Medical History:   Diagnosis Date    Anxiety     Clotting disorder 2023    Depression     GERD (gastroesophageal reflux disease)     Gross hematuria     Kidney stone     Muscle spasm     Ureteral stone     Urinary tract infection 2023    Blood in urine     Past Surgical History:   Procedure Laterality Date     SECTION      ENDOMETRIAL ABLATION      INGUINAL HERNIA REPAIR      LAPAROSCOPIC TUBAL LIGATION      TUBAL ABDOMINAL LIGATION  05    URETEROSCOPY LASER LITHOTRIPSY WITH STENT INSERTION Right 2023    Procedure: CYSTOSCOPY right URETEROSCOPY  HOLMIUM LASER STENT INSERTION, BILATERAL RETROGRADE;  Surgeon: Samra Mac MD;  Location: [facility];  Service: Urology;  Laterality: Right;         Inpatient Speech Pathology Dysphagia Evaluation    PAIN SCALE: None reported    PRECAUTIONS/CONTRAINDICATIONS: None noted    SUSPECTED ABUSE/NEGLECT/EXPLOITATION: None noted    SOCIAL/PSYCHOLOGICAL NEEDS/BARRIERS: None noted    PAST SOCIAL HISTORY: Lives at home    PRIOR FUNCTION: Independent    PATIENT GOALS/EXPECTATIONS: Did not report    HISTORY: This patient is a 54-year-old admitted with the above diagnosis.  Patient positive cerebellar stroke.    CURRENT DIET LEVEL: Regular diet-thin liquids    OBJECTIVE:    TEST  ADMINISTERED: Clinical dysphagia evaluation    COGNITION/SAFETY AWARENESS: Alert and oriented    BEHAVIORAL OBSERVATIONS: Pleasant and cooperative    ORAL MOTOR EXAM: Lingual and labial strength and range of motion within functional limits    VOICE QUALITY: Within normal limit    REFLEX EXAM: Deferred    POSTURE: 90 degrees upright    FEEDING/SWALLOWING FUNCTION: Assessed with full range of consistencies with thin liquids from spoon cup and straw, purée consistency soft and hard solids    CLINICAL OBSERVATIONS: Oral stage is characterized by good bolus preparation and control.  Timely oral transit.  Pharyngeal phase is timely with good laryngeal elevation per palpation.  No clinical signs or symptoms of aspiration noted.  Vocal quality remained clear.  Denies globus sensation after completion of swallow.    DYSPHAGIA CRITERIA: N/A    FUNCTIONAL ASSESSMENT INSTRUMENT: Patient currently scored a level 7 of 7 on Functional Communication Measures for swallowing indicating a 0% limitation in function.    ASSESSMENT/ PLAN OF CARE:  Pt presents with functional oropharyngeal swallow.  No clinical signs or symptoms of aspiration noted    REHAB POTENTIAL:  Pt has good rehab potential.  The following limitations may influence improvement/ length of tx medical status.    RECOMMENDATIONS:   1.   DIET: Regular diet - thin liquids.     2.  POSITION: 90 degrees upright    3.  COMPENSATORY STRATEGIES: General Swallow precautions    No further dysphagia therapy is indicated at this time    Pt/responsible party agrees with plan of care and has been informed of all alternatives, risks and benefits.    EDUCATION  The patient has been educated in the following areas:   Dysphagia (Swallowing Impairment).          Rosie Spivey, SLP  6/18/2025

## 2025-06-18 NOTE — PLAN OF CARE
Goal Outcome Evaluation:  Plan of Care Reviewed With: (P) patient           Outcome Evaluation: (P) Patient presents with no limitaions impeding safe transfers and ambulation. Skilled rehab services are not required at this time.    Anticipated Discharge Disposition (PT): (P) home

## 2025-06-18 NOTE — PLAN OF CARE
Goal Outcome Evaluation:  Plan of Care Reviewed With: patient        Progress: no change  Outcome Evaluation: Patient is pleasant and cooperative; A&Ox4 with only complaints of inconsistent and occ. dizziness per her reports; ADL/Mobility/Transfer performance all display no new onset of deficits, ind. without the use of an AD in room for all; OT eval only; Safe to discharge home upon medical approval per MD for ADL performance.    Anticipated Discharge Disposition (OT): home

## 2025-06-18 NOTE — PLAN OF CARE
Goal Outcome Evaluation:  Plan of Care Reviewed With: patient        Progress: improving  Outcome Evaluation: VSS. Patient rested well throughout the shift. Up several times to use the restroom, standby as gait is a little unsteady. No acute changes overnight. WCTM.

## 2025-06-19 ENCOUNTER — READMISSION MANAGEMENT (OUTPATIENT)
Dept: CALL CENTER | Facility: HOSPITAL | Age: 55
End: 2025-06-19
Payer: COMMERCIAL

## 2025-06-19 VITALS
BODY MASS INDEX: 24.27 KG/M2 | SYSTOLIC BLOOD PRESSURE: 136 MMHG | HEART RATE: 66 BPM | WEIGHT: 151.01 LBS | OXYGEN SATURATION: 96 % | DIASTOLIC BLOOD PRESSURE: 84 MMHG | TEMPERATURE: 98.2 F | RESPIRATION RATE: 16 BRPM | HEIGHT: 66 IN

## 2025-06-19 PROCEDURE — 25010000002 ENOXAPARIN PER 10 MG: Performed by: STUDENT IN AN ORGANIZED HEALTH CARE EDUCATION/TRAINING PROGRAM

## 2025-06-19 PROCEDURE — 99239 HOSP IP/OBS DSCHRG MGMT >30: CPT | Performed by: STUDENT IN AN ORGANIZED HEALTH CARE EDUCATION/TRAINING PROGRAM

## 2025-06-19 RX ADMIN — ENOXAPARIN SODIUM 40 MG: 100 INJECTION SUBCUTANEOUS at 08:57

## 2025-06-19 RX ADMIN — ASPIRIN 81 MG: 81 TABLET, CHEWABLE ORAL at 08:57

## 2025-06-19 RX ADMIN — Medication 10 ML: at 08:58

## 2025-06-19 RX ADMIN — SERTRALINE HYDROCHLORIDE 100 MG: 100 TABLET ORAL at 08:57

## 2025-06-19 NOTE — OUTREACH NOTE
Prep Survey      Flowsheet Row Responses   [facility] patient discharged from? [facility]   Is LACE score < 7 ? Yes   Eligibility [facility]   Date of Admission 06/17/25   Date of Discharge 06/19/25   Discharge Disposition Home or Self Care   Discharge diagnosis *CVA (cerebral vascular accident)   Does the patient have one of the following disease processes/diagnoses(primary or secondary)? Stroke   Does the patient have Home health ordered? No   Is there a DME ordered? No   Prep survey completed? Yes            ANTIONE CASTRO - Registered Nurse

## 2025-06-19 NOTE — DISCHARGE SUMMARY
[facility]         HOSPITALIST  DISCHARGE SUMMARY    Patient Name: Rosie Medrano  : 1970  MRN: [id number]    Date of Admission: 2025  Date of Discharge:  25  Primary Care Physician: Andrés Marcial MD    Consults       Date and Time Order Name Status Description    2025 10:37 PM Inpatient Neurology Consult Stroke      2025  5:43 PM Inpatient Hospitalist Consult      2025  4:44 PM IP General Consult (Use specialty-specific consult if known)              Active and Resolved Hospital Problems:  Active Hospital Problems    Diagnosis POA   • **CVA (cerebral vascular accident) [I63.9] Yes      Resolved Hospital Problems   No resolved problems to display.       Hospital Course     Hospital Course:  Rosie Medrano is a 54 y.o. female significant past medical history anxiety, depression, GERD presented to the ED complaining of dizziness for a week.  Patient initially went to her PCP due to dizziness about a week ago when she was diagnosed with ear infection and was started on Augmentin, symptoms did not improve so presented to the ER. MRI of the brain was obtained revealing acute infarcts in the cerebellum. Teleneurology was consulted, recommending admission for stroke workup. Started on aspirin and statin. High LDL.  Hemoglobin A1c within normal limits. Worked with PT. CTA within normal limits.  Neurology cleared patient for discharge.  She is medically stable for discharge home.        DISCHARGE Follow Up Recommendations for labs and diagnostics:   -Follow-up with PCP  -Follow-up with neurology  -Follow-up with cardiology for outpatient cardiac monitoring      Day of Discharge     Vital Signs:  Temp:  [97.9 °F (36.6 °C)-98.6 °F (37 °C)] 98.2 °F (36.8 °C)  Heart Rate:  [62-82] 66  Resp:  [16-20] 16  BP: (135-165)/(84-98) 136/84  Physical Exam:   General: No acute distress  Cardiovascular: Normal S1, S2  Pulmonary: CTAB  Neuro: Moves all 4 extremities  spontaneously    Discharge Details        Discharge Medications        New Medications        Instructions Start Date   aspirin 81 MG chewable tablet   81 mg, Oral, Daily      atorvastatin 80 MG tablet  Commonly known as: LIPITOR   80 mg, Oral, Nightly             Continue These Medications        Instructions Start Date   QUEtiapine  MG 24 hr tablet  Commonly known as: SEROquel XR   200 mg, Oral, Nightly      sertraline 100 MG tablet  Commonly known as: ZOLOFT   100 mg, Oral, Daily             Stop These Medications      meclizine 25 MG tablet  Commonly known as: Medi-Meclizine     ondansetron 8 MG tablet  Commonly known as: ZOFRAN              No Known Allergies    Discharge Disposition:  Home or Self Care    Diet:  Hospital:  Diet Order   Procedures   • Diet: Regular/House; Fluid Consistency: Thin (IDDSI 0)       Discharge Activity:   Activity Instructions       Activity as Tolerated              CODE STATUS:  Code Status and Medical Interventions: CPR (Attempt to Resuscitate); Full Support   Ordered at: 06/17/25 3700     Code Status (Patient has no pulse and is not breathing):    CPR (Attempt to Resuscitate)     Medical Interventions (Patient has pulse or is breathing):    Full Support     Level Of Support Discussed With:    Patient         Future Appointments   Date Time Provider Department Center   6/20/2025  1:30 PM Andrés Marcial MD [facility]       Additional Instructions for the Follow-ups that You Need to Schedule       Ambulatory Referral to Neurology   As directed      Discharge Follow-up with PCP   As directed       Currently Documented PCP:    Andrés Marcial MD    PCP Phone Number:    [phone]     Follow Up Details: 1 week                Pertinent  and/or Most Recent Results       LAB RESULTS:      Lab 06/17/25  1105   WBC 8.73   HEMOGLOBIN 17.1*   HEMATOCRIT 50.8*   PLATELETS 304   NEUTROS ABS 5.15   IMMATURE GRANS (ABS) 0.02   LYMPHS ABS 2.80   MONOS ABS 0.48   EOS ABS  0.18   MCV 95.5   PROTIME 13.7         Lab 06/17/25  1105   SODIUM 133*   POTASSIUM 4.9   CHLORIDE 97*   CO2 26.4   ANION GAP 9.6   BUN 17.7   CREATININE 1.14*   EGFR 57.7*   GLUCOSE 142*   CALCIUM 9.6   MAGNESIUM 2.5   HEMOGLOBIN A1C 5.10   TSH 0.913         Lab 06/17/25  1105   TOTAL PROTEIN 7.4   ALBUMIN 4.7   GLOBULIN 2.7   ALT (SGPT) 9   AST (SGOT) 24   BILIRUBIN 0.4   ALK PHOS 99         Lab 06/17/25  1248 06/17/25  1105   HSTROP T <6 <6   PROTIME  --  13.7   INR  --  1.01         Lab 06/18/25  0641   CHOLESTEROL 175   LDL CHOL 122*   HDL CHOL 29*   TRIGLYCERIDES 129             Brief Urine Lab Results  (Last result in the past 365 days)        Color   Clarity   Blood   Leuk Est   Nitrite   Protein   CREAT   Urine HCG        06/17/25 1108 Dark Yellow   Turbid   Negative   Small (1+)   Negative   Negative                 Microbiology Results (last 10 days)       ** No results found for the last 240 hours. **            CT Angiogram Head  Result Date: 6/18/2025  Impression: 1.No intracranial stenoses, aneurysms, or occlusions. 2.Mild plaquing in the carotid bulbs bilaterally, but no hemodynamically significant stenosis. 3.Right cerebellar infarct is not well appreciated on this exam. Electronically Signed: Rigoberto Rubio MD  6/18/2025 8:46 PM EDT  Workstation ID: [id number]    CT Angiogram Neck  Result Date: 6/18/2025  Impression: 1.No intracranial stenoses, aneurysms, or occlusions. 2.Mild plaquing in the carotid bulbs bilaterally, but no hemodynamically significant stenosis. 3.Right cerebellar infarct is not well appreciated on this exam. Electronically Signed: Rigoberto Rubio MD  6/18/2025 8:46 PM EDT  Workstation ID: [id number]    MRI Brain Without Contrast  Result Date: 6/17/2025  Impression: 1.Acute infarctions involving right cerebellum. No hemorrhagic transformation or significant mass effect. 2.Findings suggestive of mild chronic small vessel ischemic disease. 3.Partial right mastoid effusion. Electronically  Signed: Enrrique Blanchard MD  6/17/2025 4:13 PM EDT  Workstation ID: [id number]    XR Chest 1 View  Result Date: 6/17/2025  Impression: No acute cardiopulmonary findings. Electronically Signed: Man Suarez MD  6/17/2025 12:50 PM EDT  Workstation ID: [id number]    CT Head Without Contrast  Result Date: 6/17/2025  Impression: Small linear area of hypoattenuation within the right cerebellum, not present on prior brain MRI but without acute features by CT and may represent sequela of prior insult. No acute intracranial findings otherwise. Brain MRI could be considered for further evaluation if felt to be clinically indicated. Electronically Signed: Man Suarez MD  6/17/2025 12:49 PM EDT  Workstation ID: [id number]       Results for orders placed during the hospital encounter of 06/17/25    Duplex Carotid Ultrasound CAR    Interpretation Summary  •  Right internal carotid artery demonstrates normal flow without evidence of hemodynamically significant stenosis.  •  Left internal carotid artery demonstrates normal flow without evidence of hemodynamically significant stenosis.  •  There is minimal to mild nonstenotic carotid bulb plaque bilaterally.  •  Antegrade right vertebral flow.  •  Antegrade left vertebral flow.      Results for orders placed during the hospital encounter of 06/17/25    Duplex Carotid Ultrasound CAR    Interpretation Summary  •  Right internal carotid artery demonstrates normal flow without evidence of hemodynamically significant stenosis.  •  Left internal carotid artery demonstrates normal flow without evidence of hemodynamically significant stenosis.  •  There is minimal to mild nonstenotic carotid bulb plaque bilaterally.  •  Antegrade right vertebral flow.  •  Antegrade left vertebral flow.              The ASCVD Risk score (Santos DK, et al., 2019) failed to calculate for the following reasons:    Risk score cannot be calculated because patient has a medical history suggesting prior/existing ASCVD      Time spent on Discharge including face to face service:  35 minutes    Electronically signed by Jeanmarie Currie MD, 06/19/25, 9:24 AM EDT.

## 2025-06-19 NOTE — PLAN OF CARE
Goal Outcome Evaluation:  Plan of Care Reviewed With: patient, spouse        Progress: improving  Outcome Evaluation: VSS. A/Ox4. no acute needs or changes at this time.

## 2025-06-20 ENCOUNTER — OFFICE VISIT (OUTPATIENT)
Dept: FAMILY MEDICINE CLINIC | Facility: CLINIC | Age: 55
End: 2025-06-20
Payer: COMMERCIAL

## 2025-06-20 ENCOUNTER — TRANSITIONAL CARE MANAGEMENT TELEPHONE ENCOUNTER (OUTPATIENT)
Dept: CALL CENTER | Facility: HOSPITAL | Age: 55
End: 2025-06-20
Payer: COMMERCIAL

## 2025-06-20 VITALS
HEART RATE: 94 BPM | WEIGHT: 154.7 LBS | BODY MASS INDEX: 24.86 KG/M2 | TEMPERATURE: 99.3 F | DIASTOLIC BLOOD PRESSURE: 78 MMHG | SYSTOLIC BLOOD PRESSURE: 116 MMHG | HEIGHT: 66 IN | OXYGEN SATURATION: 97 %

## 2025-06-20 DIAGNOSIS — F32.9 MAJOR DEPRESSIVE DISORDER, SINGLE EPISODE, UNSPECIFIED: ICD-10-CM

## 2025-06-20 DIAGNOSIS — Z72.0 TOBACCO ABUSE: ICD-10-CM

## 2025-06-20 DIAGNOSIS — I10 PRIMARY HYPERTENSION: ICD-10-CM

## 2025-06-20 DIAGNOSIS — Z09 HOSPITAL DISCHARGE FOLLOW-UP: Primary | ICD-10-CM

## 2025-06-20 DIAGNOSIS — Z86.73 HISTORY OF RECENT STROKE: ICD-10-CM

## 2025-06-20 DIAGNOSIS — E78.2 MIXED HYPERLIPIDEMIA: ICD-10-CM

## 2025-06-20 LAB
ALBUMIN SERPL-MCNC: 4.4 G/DL (ref 3.5–5.2)
ALBUMIN/GLOB SERPL: 2.3 G/DL
ALP SERPL-CCNC: 85 U/L (ref 39–117)
ALT SERPL W P-5'-P-CCNC: 11 U/L (ref 1–33)
ANION GAP SERPL CALCULATED.3IONS-SCNC: 14.8 MMOL/L (ref 5–15)
AORTIC DIMENSIONLESS INDEX: 0.77 (DI)
ASCENDING AORTA: 3.3 CM
AST SERPL-CCNC: 22 U/L (ref 1–32)
AV MEAN PRESS GRAD SYS DOP V1V2: 2.7 MMHG
AV VMAX SYS DOP: 112.2 CM/SEC
BH CV ECHO MEAS - AO MAX PG: 5 MMHG
BH CV ECHO MEAS - AO ROOT DIAM: 3.2 CM
BH CV ECHO MEAS - AO V2 VTI: 26.1 CM
BH CV ECHO MEAS - AVA(I,D): 2.7 CM2
BH CV ECHO MEAS - EDV(MOD-SP2): 77.8 ML
BH CV ECHO MEAS - EDV(MOD-SP4): 75.7 ML
BH CV ECHO MEAS - EF(MOD-SP2): 52.3 %
BH CV ECHO MEAS - EF(MOD-SP4): 55.9 %
BH CV ECHO MEAS - ESV(MOD-SP2): 37.1 ML
BH CV ECHO MEAS - ESV(MOD-SP4): 33.4 ML
BH CV ECHO MEAS - IVS/LVPW: 1.17 CM
BH CV ECHO MEAS - IVSD: 1.4 CM
BH CV ECHO MEAS - LA DIMENSION: 3.6 CM
BH CV ECHO MEAS - LAT PEAK E' VEL: 12.6 CM/SEC
BH CV ECHO MEAS - LV DIASTOLIC VOL/BSA (35-75): 42.6 CM2
BH CV ECHO MEAS - LV MAX PG: 3.2 MMHG
BH CV ECHO MEAS - LV MEAN PG: 2.1 MMHG
BH CV ECHO MEAS - LV SYSTOLIC VOL/BSA (12-30): 18.8 CM2
BH CV ECHO MEAS - LV V1 MAX: 90 CM/SEC
BH CV ECHO MEAS - LV V1 VTI: 20 CM
BH CV ECHO MEAS - LVIDD: 4.1 CM
BH CV ECHO MEAS - LVIDS: 2.9 CM
BH CV ECHO MEAS - LVOT AREA: 3.5 CM2
BH CV ECHO MEAS - LVOT DIAM: 2.1 CM
BH CV ECHO MEAS - LVPWD: 1.2 CM
BH CV ECHO MEAS - MED PEAK E' VEL: 12 CM/SEC
BH CV ECHO MEAS - MV A MAX VEL: 80.9 CM/SEC
BH CV ECHO MEAS - MV DEC SLOPE: 377 CM/SEC2
BH CV ECHO MEAS - MV E MAX VEL: 64.1 CM/SEC
BH CV ECHO MEAS - MV E/A: 0.79
BH CV ECHO MEAS - MV MAX PG: 3 MMHG
BH CV ECHO MEAS - MV MEAN PG: 1 MMHG
BH CV ECHO MEAS - MV P1/2T: 56 MSEC
BH CV ECHO MEAS - MV V2 VTI: 21.4 CM
BH CV ECHO MEAS - MVA(P1/2T): 3.9 CM2
BH CV ECHO MEAS - MVA(VTI): 3.2 CM2
BH CV ECHO MEAS - RAP SYSTOLE: 3 MMHG
BH CV ECHO MEAS - RVDD: 2.7 CM
BH CV ECHO MEAS - RVSP: 20.2 MMHG
BH CV ECHO MEAS - SV(LVOT): 69.3 ML
BH CV ECHO MEAS - SV(MOD-SP2): 40.7 ML
BH CV ECHO MEAS - SV(MOD-SP4): 42.3 ML
BH CV ECHO MEAS - SVI(LVOT): 39 ML/M2
BH CV ECHO MEAS - SVI(MOD-SP2): 22.9 ML/M2
BH CV ECHO MEAS - SVI(MOD-SP4): 23.8 ML/M2
BH CV ECHO MEAS - TR MAX PG: 17.2 MMHG
BH CV ECHO MEAS - TR MAX VEL: 207.6 CM/SEC
BH CV ECHO MEASUREMENTS AVERAGE E/E' RATIO: 5.21
BH CV ECHO SHUNT ASSESSMENT PERFORMED (HIDDEN SCRIPTING): 1
BILIRUB SERPL-MCNC: 0.2 MG/DL (ref 0–1.2)
BUN SERPL-MCNC: 11.4 MG/DL (ref 6–20)
BUN/CREAT SERPL: 13.6 (ref 7–25)
CALCIUM SPEC-SCNC: 9 MG/DL (ref 8.6–10.5)
CHLORIDE SERPL-SCNC: 103 MMOL/L (ref 98–107)
CO2 SERPL-SCNC: 17.2 MMOL/L (ref 22–29)
CREAT SERPL-MCNC: 0.84 MG/DL (ref 0.57–1)
EGFRCR SERPLBLD CKD-EPI 2021: 83.2 ML/MIN/1.73
GLOBULIN UR ELPH-MCNC: 1.9 GM/DL
GLUCOSE SERPL-MCNC: 105 MG/DL (ref 65–99)
LEFT ATRIUM VOLUME INDEX: 29.7 ML/M2
LV EF BIPLANE MOD: 55 %
POTASSIUM SERPL-SCNC: 4.8 MMOL/L (ref 3.5–5.2)
PROT SERPL-MCNC: 6.3 G/DL (ref 6–8.5)
SODIUM SERPL-SCNC: 135 MMOL/L (ref 136–145)

## 2025-06-20 RX ORDER — BUPROPION HYDROCHLORIDE 150 MG/1
150 TABLET, FILM COATED, EXTENDED RELEASE ORAL 2 TIMES DAILY
Qty: 60 TABLET | Refills: 2 | Status: SHIPPED | OUTPATIENT
Start: 2025-06-20

## 2025-06-20 RX ORDER — SERTRALINE HYDROCHLORIDE 100 MG/1
100 TABLET, FILM COATED ORAL DAILY
Qty: 30 TABLET | Refills: 5 | Status: SHIPPED | OUTPATIENT
Start: 2025-06-20

## 2025-06-20 RX ORDER — ASPIRIN 81 MG/1
81 TABLET, CHEWABLE ORAL DAILY
Qty: 30 TABLET | Refills: 5 | Status: SHIPPED | OUTPATIENT
Start: 2025-06-20 | End: 2026-06-20

## 2025-06-20 RX ORDER — ATORVASTATIN CALCIUM 80 MG/1
80 TABLET, FILM COATED ORAL NIGHTLY
Qty: 30 TABLET | Refills: 5 | Status: SHIPPED | OUTPATIENT
Start: 2025-06-20 | End: 2026-06-20

## 2025-06-20 RX ORDER — QUETIAPINE 200 MG/1
200 TABLET, FILM COATED, EXTENDED RELEASE ORAL NIGHTLY
Qty: 30 TABLET | Refills: 5 | Status: SHIPPED | OUTPATIENT
Start: 2025-06-20

## 2025-06-20 NOTE — PROGRESS NOTES
Transitional Care Follow Up Visit  Subjective     Rosie Medrano is a 54 y.o. female who presents for a transitional care management visit.    Within 48 business hours after discharge our office contacted her via telephone to coordinate her care and needs.      I reviewed and discussed the details of that call along with the discharge summary, hospital problems, inpatient lab results, inpatient diagnostic studies, and consultation reports with Rosie.     Current outpatient and discharge medications have been reconciled for the patient.  Reviewed by: Andrés Marcial MD          2025    12:27 PM   Date of TCM Phone Call   [facility]   Date of Admission 2025   Date of Discharge 2025   Discharge Disposition Home or Self Care     Risk for Readmission (LACE) Score: 6 (2025  6:00 AM)      History of Present Illness  Pt had recent stroke- no longer having any symptoms- dizziness resolved     Course During Hospital Stay:  pt admitted for cerebellum stroke that was causing dizziness.  Treated and received PT.     The following portions of the patient's history were reviewed and updated as appropriate: She  has a past medical history of Anxiety, Clotting disorder (2023), Depression, GERD (gastroesophageal reflux disease), Gross hematuria, Kidney stone, Muscle spasm, Ureteral stone, and Urinary tract infection (2023).  She does not have any pertinent problems on file.  She  has a past surgical history that includes  section; Inguinal hernia repair; Endometrial ablation; Laparoscopic tubal ligation; ureteroscopy laser lithotripsy with stent insertion (Right, 2023); and Tubal ligation (05).  Her family history includes Anxiety disorder in her mother; Arthritis in an other family member; COPD in an other family member; Depression in her mother; Diabetes in her mother; Diabetes type II in an other family member; Hypertension in her mother; Kidney  disease in her mother; Multiple sclerosis in her sister; No Known Problems in her daughter, son, and son.  She  reports that she has been smoking cigarettes. She started smoking about 37 years ago. She has a 37.6 pack-year smoking history. She has been exposed to tobacco smoke. She has never used smokeless tobacco. She reports that she does not currently use alcohol. She reports that she does not use drugs.  Current Outpatient Medications   Medication Sig Dispense Refill    aspirin 81 MG chewable tablet Chew 1 tablet Daily. 30 tablet 5    atorvastatin (LIPITOR) 80 MG tablet Take 1 tablet by mouth Every Night. 30 tablet 5    QUEtiapine XR (SEROquel XR) 200 MG 24 hr tablet Take 1 tablet by mouth Every Night. 30 tablet 5    sertraline (ZOLOFT) 100 MG tablet Take 1 tablet by mouth Daily. 30 tablet 5    buPROPion (ZYBAN) 150 MG 12 hr tablet Take 150 mg by mouth 2 (Two) Times a Day. 60 tablet 2     No current facility-administered medications for this visit.     Current Outpatient Medications on File Prior to Visit   Medication Sig    [DISCONTINUED] aspirin 81 MG chewable tablet Chew 1 tablet Daily for 30 days.    [DISCONTINUED] atorvastatin (LIPITOR) 80 MG tablet Take 1 tablet by mouth Every Night for 30 days.    [DISCONTINUED] QUEtiapine XR (SEROquel XR) 200 MG 24 hr tablet Take 1 tablet by mouth Every Night.    [DISCONTINUED] sertraline (ZOLOFT) 100 MG tablet Take 1 tablet by mouth Daily.     No current facility-administered medications on file prior to visit.     She has no known allergies..    Review of Systems   Constitutional:  Negative for fatigue and fever.   HENT:  Negative for sore throat.    Eyes:  Negative for visual disturbance.   Respiratory:  Negative for apnea, cough, shortness of breath and wheezing.    Cardiovascular:  Negative for chest pain, palpitations and leg swelling.   Gastrointestinal:  Negative for abdominal pain, diarrhea, nausea and vomiting.   Skin:  Negative for rash.   Neurological:   "Negative for dizziness, light-headedness and headaches.       Objective   /78 (BP Location: Right arm, Patient Position: Sitting, Cuff Size: Adult)   Pulse 94   Temp 99.3 °F (37.4 °C) (Temporal)   Ht 167.6 cm (66\")   Wt 70.2 kg (154 lb 11.2 oz)   SpO2 97%   BMI 24.97 kg/m²   Physical Exam  Vitals reviewed.   Constitutional:       Appearance: Normal appearance. She is well-developed.   HENT:      Head: Normocephalic and atraumatic.      Right Ear: Tympanic membrane, ear canal and external ear normal.      Left Ear: Tympanic membrane, ear canal and external ear normal.      Nose: Nose normal.      Mouth/Throat:      Mouth: Mucous membranes are moist.      Pharynx: Oropharynx is clear. No oropharyngeal exudate or posterior oropharyngeal erythema.   Eyes:      Conjunctiva/sclera: Conjunctivae normal.      Pupils: Pupils are equal, round, and reactive to light.   Cardiovascular:      Rate and Rhythm: Normal rate and regular rhythm.      Pulses: Normal pulses.      Heart sounds: Normal heart sounds. No murmur heard.     No friction rub. No gallop.   Pulmonary:      Effort: Pulmonary effort is normal.      Breath sounds: Normal breath sounds. No wheezing or rhonchi.   Abdominal:      General: Abdomen is flat. Bowel sounds are normal. There is no distension.      Palpations: Abdomen is soft. There is no mass.      Tenderness: There is no abdominal tenderness. There is no guarding or rebound.      Hernia: No hernia is present.   Musculoskeletal:         General: Normal range of motion.   Skin:     General: Skin is warm and dry.      Capillary Refill: Capillary refill takes less than 2 seconds.   Neurological:      General: No focal deficit present.      Mental Status: She is alert and oriented to person, place, and time.      Cranial Nerves: No cranial nerve deficit.   Psychiatric:         Mood and Affect: Mood and affect normal.         Behavior: Behavior normal.         Thought Content: Thought content normal.  "        Judgment: Judgment normal.         Assessment & Plan   Problems Addressed this Visit       Major depressive disorder, single episode, unspecified    Relevant Medications    QUEtiapine XR (SEROquel XR) 200 MG 24 hr tablet    sertraline (ZOLOFT) 100 MG tablet    buPROPion (ZYBAN) 150 MG 12 hr tablet    Primary hypertension    Relevant Orders    Comprehensive Metabolic Panel    Mixed hyperlipidemia    Relevant Medications    atorvastatin (LIPITOR) 80 MG tablet     Other Visit Diagnoses         Hospital discharge follow-up    -  Primary      History of recent stroke        Relevant Medications    aspirin 81 MG chewable tablet    Other Relevant Orders    Ambulatory Referral to Cardiology for Other; recent stroke- needs outpatient cardiac monitoring    Ambulatory Referral to Neurology      Tobacco abuse        Relevant Medications    buPROPion (ZYBAN) 150 MG 12 hr tablet          Diagnoses         Codes Comments      Hospital discharge follow-up    -  Primary ICD-10-CM: Z09  ICD-9-CM: V67.59       History of recent stroke     ICD-10-CM: Z86.73  ICD-9-CM: V12.54       Major depressive disorder, single episode, unspecified     ICD-10-CM: F32.9  ICD-9-CM: 296.20       Primary hypertension     ICD-10-CM: I10  ICD-9-CM: 401.9       Mixed hyperlipidemia     ICD-10-CM: E78.2  ICD-9-CM: 272.2       Tobacco abuse     ICD-10-CM: Z72.0  ICD-9-CM: 305.1

## 2025-06-20 NOTE — OUTREACH NOTE
Call Center TCM Note      Flowsheet Row Responses   [facility] patient discharged from? [facility]   Does the patient have one of the following disease processes/diagnoses(primary or secondary)? Stroke   TCM attempt successful? Yes   Discharge diagnosis *CVA (cerebral vascular accident)   TCM call completed? Yes   Wrap up additional comments TCM appt completed on 6/20/25, PCP appointment within 2 business days of DC fulfills the TCM requirement, no call necessary.            GREER ARCHULETA - Registered Nurse    6/20/2025, 14:01 EDT

## 2025-06-24 NOTE — PROGRESS NOTES
Chief Complaint  Hospital FU for cerebellar CVA    Patient or patient representative verbalized consent for the use of Ambient Listening during the visit with  Panchito Ramirez MD PhD for chart documentation. 6/26/2025  15:07 EDT    Subjective      History of Present Illness:  Rosie Medrano is a delightful 54 y.o. right handed female who presents to [facility] referred for hospital FU for cerebellar CVA with history of:  Past Medical History:   Diagnosis Date    Anxiety     Clotting disorder April 2023    Depression     GERD (gastroesophageal reflux disease)     Gross hematuria     Kidney stone     Muscle spasm     Ureteral stone     Urinary tract infection April 2023    Blood in urine   Unaccompanied today.      From [facility] Discharge Summary 6/17-19/2025:  Patient presented to the ED complaining of dizziness x 1 week.  PCP started Augmentin, but symptoms did not improve so presented to the ER. MRI of the brain was obtained revealing acute infarcts in the cerebellum. Teleneurology was consulted, started ASA and statin. Hemoglobin A1c within normal limits. Worked with PT. CTA within normal limits.  Discharged to home.      History of Present Illness  The patient is a 54-year-old female referred to neurology as a hospital follow-up for a cerebellar stroke.    She was admitted to the hospital on 06/17/2025 due to persistent dizziness lasting approximately one week. Despite initial treatment by her primary care physician, her symptoms did not improve, leading to her emergency room visit. She was diagnosed with a stroke and discharged home on 06/19/2025 without the need for physical therapy. Since her discharge, she has not experienced any new stroke-like symptoms, and her dizziness has significantly improved.    Initially, her symptoms were attributed to an ear infection due to redness in her ears. She took a two-day leave from work but returned after a week. 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 This prompted her to seek medical attention again, resulting in her referral to the emergency room.    She reports no history of diabetes, prediabetes, or gestational diabetes. Her blood pressure typically remains within normal limits. She maintains a regular diet and engages in physical activity through her job, which involves walking. She reports no known cardiac disorders such as arrhythmias, atrial fibrillation, or valvular disease. She also reports no history of previous strokes or transient ischemic attacks. She continues to smoke and occasionally consumes alcohol but has never been a heavy drinker. She is currently not on any anticoagulation therapy. She was informed by Dr. Marcial that her cholesterol levels were not excessively high.    SOCIAL HISTORY:    Marital Status:     Diet: Regular diet    Alcohol: Occasionally consumes alcohol    Tobacco: Smokes cigarettes    FAMILY HISTORY  - Mother: Stroke, a little over a month ago  - Negative for heart attack    MEDICATIONS  CURRENT MEDS:  Aspirin Oral Daily  Start Date: 06/2025  Atorvastatin Oral  Start Date: 06/2025  Wellbutrin Oral        All available pertinent Labs and Imaging personally reviewed, including:    Imaging:    MRI brain wo contrast 6/17/2025:  IMPRESSION:  1.Acute infarctions involving right cerebellum. No hemorrhagic transformation or significant mass effect.   2.Findings suggestive of mild chronic small vessel ischemic disease.   3.Partial right mastoid effusion.       CT Angiogram Head and Neck 6/17/2025:  Impression  1.No intracranial stenoses, aneurysms, or occlusions.  2.Mild plaquing in the carotid bulbs bilaterally, but no hemodynamically significant stenosis.  3.Right cerebellar infarct is not well appreciated on this exam.      Duplex Carotid Ultrasound CAR  "6/17/2025:  Interpretation Summary    Right internal carotid artery demonstrates normal flow without evidence of hemodynamically significant stenosis.    Left internal carotid artery demonstrates normal flow without evidence of hemodynamically significant stenosis.    There is minimal to mild nonstenotic carotid bulb plaque bilaterally.    Antegrade right vertebral flow.    Antegrade left vertebral flow.        Labs:  Lab Results   Component Value Date    WBC 8.73 06/17/2025    HGB 17.1 (H) 06/17/2025    HCT 50.8 (H) 06/17/2025    MCV 95.5 06/17/2025     06/17/2025     Lab Results   Component Value Date    GLUCOSE 105 (H) 06/18/2025    BUN 11.4 06/18/2025    CREATININE 0.84 06/18/2025     (L) 06/18/2025    K 4.8 06/18/2025     06/18/2025    CALCIUM 9.0 06/18/2025    PROTEINTOT 6.3 06/18/2025    ALBUMIN 4.4 06/18/2025    ALT 11 06/18/2025    AST 22 06/18/2025    ALKPHOS 85 06/18/2025    BILITOT 0.2 06/18/2025    GLOB 1.9 06/18/2025    AGRATIO 2.3 06/18/2025    BCR 13.6 06/18/2025    ANIONGAP 14.8 06/18/2025    EGFR 83.2 06/18/2025     Lab Results   Component Value Date    HGBA1C 5.10 06/17/2025     Lab Results   Component Value Date    TSH 0.913 06/17/2025     Lab Results   Component Value Date    UKCAKOIC72 851 02/29/2024     Lab Results   Component Value Date    FOLATE 11.50 02/29/2024     Lab Results   Component Value Date    CHOL 175 06/18/2025    TRIG 129 06/18/2025    HDL 29 (L) 06/18/2025     (H) 06/18/2025         Objective   Vital Signs:   /77 (BP Location: Left arm, Patient Position: Sitting, Cuff Size: Adult)   Pulse 88   Ht 167.6 cm (66\")   Wt 70.8 kg (156 lb)   BMI 25.18 kg/m²       NEUROLOGICAL EXAM:  MS: A+O x 3, language spontaneous, fluent with logical content, reported own and regarded as excellent historian, normal judgement and insight, mood euthymic;  present and corroborates and adds to history.  CN: LOGAN, ARTEMIOMI with full excursions in all cardinal " directions with smooth pursuits throughout without saccadic breaks noted; horizontal and vertical saccades symmetric and on target. Cover test reveals no phoria. Visual fields full to confrontation. V1-III Light touch symm and intact; symm jaw clench masseter and temporalis bulk and tone, medial and lateral pterygoids intact. Symm forehead crease and grimace. Hearing grossly symm and intact to finger rub. Uvula and soft palate midline rise on gag. Sternocleidomastoids and traps symm and 5/5. Tongue demonstrates no furrowing and extends midline and into left and right.  MOTOR: no atrophy/drift, normal tone, strength 5/5 x 4 extremities, no adventitial movements or tremor noted  SENSORY: LT intact, symm. Romberg - NEG  COORD: ANDI/FTN/HTS with good rhythm, speed, and amplitude. No ataxia noted.  GAIT: Native gait with good stride, nml symm paulette armswing, nml start/stop/turn.   DTR's: 2+ throughout with 3+ patellas; no clonus, Babinski - Absent         ASSESSMENT & PLAN:    54 y.o. female who presents to [facility]ERY referred for hospital FU for cerebellar CVA.    From [facility] Discharge Summary 6/17-19/2025:  Patient presented to the ED complaining of dizziness x 1 week.  PCP started Augmentin, but symptoms did not improve so presented to the ER. MRI of the brain was obtained revealing acute infarcts in the cerebellum. Teleneurology was consulted, started ASA and statin. Hemoglobin A1c within normal limits. Worked with PT. CTA within normal limits.  Discharged to home.         Diagnoses and all orders for this visit:    1. H/O: CVA (cerebrovascular accident) (Primary)    2. Dizziness         Assessment & Plan  1. Cerebellar stroke.  She was admitted on 06/17/2025 after experiencing dizziness for a week. Despite initial treatment by her primary care physician, her symptoms persisted, leading to an ER visit where she was diagnosed with a right cerebellar stroke. She was discharged home  on 06/19/2025 with daily aspirin and atorvastatin. No blood pressure medication was prescribed as it was not needed. She reports today significant improvement in dizziness symptoms with no new stroke-like symptoms since discharge. The MRI of the brain confirmed a right cerebellar stroke, likely occurring about a week before the imaging based on her symptoms onset. She may experience dizziness during periods of fatigue, stress, or illness due to recrudescence. She was advised to avoid alcohol and smoking cessation was strongly recommended - which she is currently working on. She was educated on the importance of lifestyle modifications and medication adherence to prevent future strokes. She was advised to seek immediate medical attention at the ER if she experiences any new or different symptoms.  Goal monitoring by PCP for cholesterol management is maintain LDL < 70, and resting BP should be between 100 and 130 systolic consistently after 5 minutes rest. We discussed lifestyle choices including diet and exercise (see below), as well as signs or symptoms of stroke and urgent evaluation by ER for repeat stroke-like symptoms (See BE FAST below)..          Discussed below Stroke prevention measures with patient:  Lifestyle modifications recommended for secondary stroke prevention:  Take medications as prescribed to help prevent recurrent stroke and normal BP  Diet - heart healthy/diabetic diet  Exercise - increase over time to daily 45 minutes of moderate exercise (increased heart rate, mild short of breath and unable to have a conversation, and break a sweat)  Smoking - cessation  Alcohol intake - abstain    Discussed with patient common stroke signs and symptoms (see below) and importance of rapid ER medical evaluation for accurate diagnosis and appropriate time sensitive management including consideration for thrombolysis and endovascular therapy.    BE FAST common stroke signs and symptoms:  Balance/walking  difficulty  Eye changes - blurred or double vision  Face droop  Asymmetric weakness or sensory loss (right or left side)  Speech difficulty - garbled, unable to speak or understand  Time - symptom onset and last known normal state of health              Follow Up  Return if symptoms worsen or fail to improve.    39 minutes were spent caring for Rosie Medrano on this date of service. This time spent by me includes preparing for the visit, reviewing tests, obtaining/reviewing separately obtained history, performing medically appropriate exam/evaluation, counseling/educating the patient/family/caregiver, ordering medications/tests/procedures, referring/communicating with other health care professionals, documenting information in the medical record, independently interpreting results and communicating that with the patient/family/caregiver and/or care coordination.     Patient was given instructions and counseling regarding her condition or for health maintenance advice. Please see specific information pulled into the AVS if appropriate.

## 2025-06-26 ENCOUNTER — PATIENT ROUNDING (BHMG ONLY) (OUTPATIENT)
Dept: NEUROLOGY | Facility: CLINIC | Age: 55
End: 2025-06-26
Payer: COMMERCIAL

## 2025-06-26 ENCOUNTER — OFFICE VISIT (OUTPATIENT)
Dept: NEUROLOGY | Facility: CLINIC | Age: 55
End: 2025-06-26
Payer: COMMERCIAL

## 2025-06-26 VITALS
BODY MASS INDEX: 25.07 KG/M2 | HEART RATE: 88 BPM | DIASTOLIC BLOOD PRESSURE: 77 MMHG | SYSTOLIC BLOOD PRESSURE: 144 MMHG | HEIGHT: 66 IN | WEIGHT: 156 LBS

## 2025-06-26 DIAGNOSIS — Z86.73 H/O: CVA (CEREBROVASCULAR ACCIDENT): Primary | ICD-10-CM

## 2025-06-26 DIAGNOSIS — R42 DIZZINESS: ICD-10-CM

## 2025-06-26 RX ORDER — CYCLOBENZAPRINE HCL 5 MG
TABLET ORAL
COMMUNITY
Start: 2025-06-21

## 2025-06-27 ENCOUNTER — OFFICE VISIT (OUTPATIENT)
Dept: FAMILY MEDICINE CLINIC | Facility: CLINIC | Age: 55
End: 2025-06-27
Payer: COMMERCIAL

## 2025-06-27 VITALS
DIASTOLIC BLOOD PRESSURE: 84 MMHG | HEIGHT: 66 IN | TEMPERATURE: 98.4 F | OXYGEN SATURATION: 97 % | HEART RATE: 86 BPM | WEIGHT: 156.8 LBS | SYSTOLIC BLOOD PRESSURE: 132 MMHG | BODY MASS INDEX: 25.2 KG/M2

## 2025-06-27 DIAGNOSIS — Z72.0 TOBACCO ABUSE: ICD-10-CM

## 2025-06-27 DIAGNOSIS — F32.9 MAJOR DEPRESSIVE DISORDER, SINGLE EPISODE, UNSPECIFIED: ICD-10-CM

## 2025-06-27 PROCEDURE — 99213 OFFICE O/P EST LOW 20 MIN: CPT | Performed by: FAMILY MEDICINE

## 2025-06-27 RX ORDER — QUETIAPINE 200 MG/1
200 TABLET, FILM COATED, EXTENDED RELEASE ORAL NIGHTLY
Start: 2025-06-27

## 2025-06-27 RX ORDER — BUPROPION HYDROCHLORIDE 150 MG/1
150 TABLET, FILM COATED, EXTENDED RELEASE ORAL 2 TIMES DAILY
Start: 2025-06-27

## 2025-06-27 RX ORDER — SERTRALINE HYDROCHLORIDE 100 MG/1
100 TABLET, FILM COATED ORAL DAILY
Start: 2025-06-27

## 2025-06-27 NOTE — PROGRESS NOTES
Chief Complaint  Major depressive disorder (Pt f/u on medications)    Subjective          Rosie Medrano presents to [facility]  History of Present Illness  Pt doing well on meds- she feels a lot better, per pt and .  No si or hi.  Depression  Visit:  Follow-up  Follow-up Visit:     Medication compliance:  %    Treatment side effects: no side effects.    Symptoms: no chest pain, no feeling of choking, no compulsions, no confusion, no decreased concentration, no depressed mood, no dizziness, no dry mouth, no excessive worry, no hyperventilation, does not have insomnia, no irritability, no malaise/fatigue, no muscle tension, no nausea, is not nervous/anxious, no obsessions, no palpitations, no panic, no shortness of breath, no suicidal ideas, no thoughts that death would be easier, does not have a plan, no hopelessness, no feelings of worthlessness, no hypersomnia, no psychomotor agitation, no psychomotor retardation, no weight gain, no weight loss and no difficulty controlling mood      Frequency:  Occasionally    Severity:  Moderate    Sleep quality:  Good    Nighttime awakenings:  Seldom    PMH Includes: depression      Treatment tried:  Non-SSRI antidepressants and SSRI    Improvement on treatment:  Significant               Objective   No Known Allergies  Immunization History   Administered Date(s) Administered    Tdap 2024     Past Medical History:   Diagnosis Date    Anxiety     Clotting disorder 2023    Depression     GERD (gastroesophageal reflux disease)     Gross hematuria     Kidney stone     Muscle spasm     Ureteral stone     Urinary tract infection 2023    Blood in urine      Past Surgical History:   Procedure Laterality Date     SECTION      ENDOMETRIAL ABLATION      INGUINAL HERNIA REPAIR      LAPAROSCOPIC TUBAL LIGATION      TUBAL ABDOMINAL LIGATION  05    URETEROSCOPY LASER LITHOTRIPSY WITH STENT INSERTION Right  08/18/2023    Procedure: CYSTOSCOPY right URETEROSCOPY  HOLMIUM LASER STENT INSERTION, BILATERAL RETROGRADE;  Surgeon: Samra Mac MD;  Location: [facility] MAIN OR;  Service: Urology;  Laterality: Right;      Social History     Socioeconomic History    Marital status:    Tobacco Use    Smoking status: Every Day     Current packs/day: 1.00     Average packs/day: 1 pack/day for 37.6 years (37.6 ttl pk-yrs)     Types: Cigarettes     Start date: 11/19/1987     Passive exposure: Current    Smokeless tobacco: Never   Vaping Use    Vaping status: Never Used   Substance and Sexual Activity    Alcohol use: Not Currently     Comment: occasionally    Drug use: Never    Sexual activity: Defer        Current Outpatient Medications:     aspirin 81 MG chewable tablet, Chew 1 tablet Daily., Disp: 30 tablet, Rfl: 5    atorvastatin (LIPITOR) 80 MG tablet, Take 1 tablet by mouth Every Night., Disp: 30 tablet, Rfl: 5    buPROPion (ZYBAN) 150 MG 12 hr tablet, Take 150 mg by mouth 2 (Two) Times a Day., Disp: , Rfl:     cyclobenzaprine (FLEXERIL) 5 MG tablet, , Disp: , Rfl:     diclofenac (VOLTAREN) 50 MG EC tablet, , Disp: , Rfl:     QUEtiapine XR (SEROquel XR) 200 MG 24 hr tablet, Take 1 tablet by mouth Every Night., Disp: , Rfl:     sertraline (ZOLOFT) 100 MG tablet, Take 1 tablet by mouth Daily., Disp: , Rfl:    Family History   Problem Relation Age of Onset    Hypertension Mother     Depression Mother     Diabetes Mother     Anxiety disorder Mother     Kidney disease Mother     Multiple sclerosis Sister     No Known Problems Daughter     No Known Problems Son     No Known Problems Son     Arthritis Other     COPD Other     Diabetes type II Other     Malig Hyperthermia Neg Hx           Vital Signs:   Vitals:    06/27/25 1601   BP: 132/84   BP Location: Left arm   Patient Position: Sitting   Cuff Size: Large Adult   Pulse: 86   Temp: 98.4 °F (36.9 °C)   TempSrc: Temporal   SpO2: 97%   Weight: 71.1 kg (156 lb 12.8 oz)  "  Height: 167.6 cm (66\")       Review of Systems   Constitutional:  Negative for fatigue, fever, irritability, malaise/fatigue, weight gain and weight loss.   HENT:  Negative for sore throat.    Eyes:  Negative for visual disturbance.   Respiratory:  Negative for apnea, cough, shortness of breath and wheezing.    Cardiovascular:  Negative for chest pain, palpitations and leg swelling.   Gastrointestinal:  Negative for abdominal pain, diarrhea, nausea and vomiting.   Neurological:  Negative for dizziness, light-headedness and headaches.   Psychiatric/Behavioral:  Negative for confusion, decreased concentration and suicidal ideas. The patient is not nervous/anxious and does not have insomnia.       Physical Exam  Vitals reviewed.   Constitutional:       Appearance: Normal appearance. She is well-developed.   HENT:      Head: Normocephalic and atraumatic.      Right Ear: External ear normal.      Left Ear: External ear normal.      Mouth/Throat:      Pharynx: No oropharyngeal exudate.   Eyes:      Conjunctiva/sclera: Conjunctivae normal.      Pupils: Pupils are equal, round, and reactive to light.   Cardiovascular:      Rate and Rhythm: Normal rate and regular rhythm.      Pulses: Normal pulses.      Heart sounds: Normal heart sounds. No murmur heard.     No friction rub. No gallop.   Pulmonary:      Effort: Pulmonary effort is normal.      Breath sounds: Normal breath sounds. No wheezing or rhonchi.   Abdominal:      General: Abdomen is flat. Bowel sounds are normal. There is no distension.      Palpations: Abdomen is soft. There is no mass.      Tenderness: There is no abdominal tenderness. There is no guarding or rebound.      Hernia: No hernia is present.   Musculoskeletal:         General: Normal range of motion.   Skin:     General: Skin is warm and dry.      Capillary Refill: Capillary refill takes less than 2 seconds.   Neurological:      General: No focal deficit present.      Mental Status: She is alert and " oriented to person, place, and time.      Cranial Nerves: No cranial nerve deficit.   Psychiatric:         Mood and Affect: Mood and affect normal.         Behavior: Behavior normal.         Thought Content: Thought content normal.         Judgment: Judgment normal.        Result Review :   The following data was reviewed by: Andrés Marcial MD on 06/27/2025:  CMP          8/6/2024    16:19 6/17/2025    11:05 6/18/2025    06:41   CMP   Glucose 104  142  105    BUN 10  17.7  11.4    Creatinine 0.69  1.14  0.84    EGFR 103.9  57.7  83.2    Sodium 136  133  135    Potassium 4.0  4.9  4.8    Chloride 103  97  103    Calcium 9.5  9.6  9.0    Total Protein 6.6  7.4  6.3    Albumin 4.5  4.7  4.4    Globulin 2.1  2.7  1.9    Total Bilirubin 0.4  0.4  0.2    Alkaline Phosphatase 113  99  85    AST (SGOT) 25  24  22    ALT (SGPT) 22  9  11    Albumin/Globulin Ratio 2.1  1.7  2.3    BUN/Creatinine Ratio 14.5  15.5  13.6    Anion Gap 10.0  9.6  14.8      CBC          8/6/2024    16:19 6/17/2025    11:05   CBC   WBC 7.16  8.73    RBC 4.81  5.32    Hemoglobin 15.5  17.1    Hematocrit 46.2  50.8    MCV 96.0  95.5    MCH 32.2  32.1    MCHC 33.5  33.7    RDW 13.0  13.2    Platelets 303  304      Lipid Panel          8/6/2024    16:19 6/18/2025    06:41   Lipid Panel   Total Cholesterol 191  175    Triglycerides 123  129    HDL Cholesterol 39  29    VLDL Cholesterol 22  24    LDL Cholesterol  130  122    LDL/HDL Ratio 3.27  4.14      TSH          8/6/2024    16:19 6/17/2025    11:05   TSH   TSH 1.090  0.913                Assessment and Plan    Diagnoses and all orders for this visit:    1. Major depressive disorder, single episode, unspecified  -     sertraline (ZOLOFT) 100 MG tablet; Take 1 tablet by mouth Daily.  -     QUEtiapine XR (SEROquel XR) 200 MG 24 hr tablet; Take 1 tablet by mouth Every Night.    2. Tobacco abuse  -     buPROPion (ZYBAN) 150 MG 12 hr tablet; Take 150 mg by mouth 2 (Two) Times a Day.            Follow Up    Return in about 5 weeks (around 7/31/2025) for Recheck.  Patient was given instructions and counseling regarding her condition or for health maintenance advice. Please see specific information pulled into the AVS if appropriate.

## 2025-07-08 ENCOUNTER — READMISSION MANAGEMENT (OUTPATIENT)
Dept: CALL CENTER | Facility: HOSPITAL | Age: 55
End: 2025-07-08
Payer: COMMERCIAL

## 2025-07-08 NOTE — OUTREACH NOTE
Stroke Week 3 Survey      Flowsheet Row Responses   [facility] facility patient discharged from? [facility]   Does the patient have one of the following disease processes/diagnoses(primary or secondary)? Stroke   Week 3 attempt successful? No   Unsuccessful attempts Attempt 1            DOMENICO MERCEDES - Registered Nurse

## 2025-07-14 ENCOUNTER — READMISSION MANAGEMENT (OUTPATIENT)
Dept: CALL CENTER | Facility: HOSPITAL | Age: 55
End: 2025-07-14
Payer: COMMERCIAL

## 2025-07-14 NOTE — OUTREACH NOTE
Stroke Week 3 Survey      Flowsheet Row Responses   [facility] facility patient discharged from? [facility]   Does the patient have one of the following disease processes/diagnoses(primary or secondary)? Stroke   Week 3 attempt successful? No   Unsuccessful attempts Attempt 2   Revoke Other            Brielle H - Registered Nurse

## 2025-08-18 DIAGNOSIS — M54.50 LOW BACK PAIN, UNSPECIFIED: ICD-10-CM

## 2025-08-18 RX ORDER — CYCLOBENZAPRINE HCL 5 MG
5 TABLET ORAL 3 TIMES DAILY PRN
Qty: 270 TABLET | Refills: 1 | Status: SHIPPED | OUTPATIENT
Start: 2025-08-18

## (undated) DEVICE — URETERAL ACCESS SHEATH SET: Brand: NAVIGATOR HD

## (undated) DEVICE — BASIC SINGLE BASIN-LF: Brand: MEDLINE INDUSTRIES, INC.

## (undated) DEVICE — SYS IRR PUMP SGL ACTN VAC SYR 10CC

## (undated) DEVICE — Y-TYPE TUR/BLADDER IRRIGATION SET, REGULATING CLAMP

## (undated) DEVICE — Device

## (undated) DEVICE — NITINOL STONE RETRIEVAL BASKET: Brand: ESCAPE

## (undated) DEVICE — GLV SURG SENSICARE SLT PF LF 7 STRL

## (undated) DEVICE — SOL IRR NACL 0.9PCT 3000ML

## (undated) DEVICE — GW ULTRATRACK HYBRID STR/TP .035IN 3X150CM EA/5

## (undated) DEVICE — CYSTO PACK: Brand: MEDLINE INDUSTRIES, INC.

## (undated) DEVICE — ENDOSCOPIC VALVE WITH ADAPTER.: Brand: SURSEAL® II

## (undated) DEVICE — FIBR LASR HOLMIUM COMPAT 272MH DISP

## (undated) DEVICE — TOWEL,OR,DSP,ST,BLUE,STD,4/PK,20PK/CS: Brand: MEDLINE

## (undated) DEVICE — CATH URETRL FLXITP POLLACK STD 5F 70CM

## (undated) DEVICE — SKIN PREP TRAY W/CHG: Brand: MEDLINE INDUSTRIES, INC.